# Patient Record
Sex: FEMALE | Race: WHITE | Employment: FULL TIME | URBAN - METROPOLITAN AREA
[De-identification: names, ages, dates, MRNs, and addresses within clinical notes are randomized per-mention and may not be internally consistent; named-entity substitution may affect disease eponyms.]

---

## 2017-01-19 ENCOUNTER — OFFICE VISIT (OUTPATIENT)
Dept: INTERNAL MEDICINE CLINIC | Age: 52
End: 2017-01-19

## 2017-01-19 VITALS
TEMPERATURE: 98.4 F | WEIGHT: 195.8 LBS | BODY MASS INDEX: 34.69 KG/M2 | OXYGEN SATURATION: 99 % | HEART RATE: 102 BPM | DIASTOLIC BLOOD PRESSURE: 86 MMHG | HEIGHT: 63 IN | SYSTOLIC BLOOD PRESSURE: 130 MMHG

## 2017-01-19 DIAGNOSIS — R73.03 PREDIABETES: ICD-10-CM

## 2017-01-19 DIAGNOSIS — R00.2 PALPITATION: Primary | ICD-10-CM

## 2017-01-19 DIAGNOSIS — E78.00 PURE HYPERCHOLESTEROLEMIA: ICD-10-CM

## 2017-01-19 DIAGNOSIS — R53.83 FATIGUE, UNSPECIFIED TYPE: ICD-10-CM

## 2017-01-19 NOTE — PROGRESS NOTES
HISTORY OF PRESENT ILLNESS  Don Mcelroy is a 46 y.o. female. HPI     F/u HLD prediabetes. Palpitations occurring last 6-7 months , several times per day lasts minutes--heart pounding, gets sweaty and sob  On savella and naltrexone per rheum MD -but did not think these were the side effects of the meds  No cp sxs  Takes hctz for hand and pedal edema  MGF-afib  FH DM-2  Only 1 cup of coffee every day--caffeine intake  Has been consuming more fruits and vegetables    Patient Active Problem List    Diagnosis Date Noted    Hepatic steatosis 06/23/2016    Dislocation of prosthetic joint ([facility] 07/25/2014    HLD (hyperlipidemia) 07/11/2014    Prediabetes 09/03/2013    Ischemic colitis ([facility] 09/03/2013    DJD (degenerative joint disease) of knee 06/04/2013    Fibromyalgia 09/30/2011    Insomnia 09/30/2011     Current Outpatient Prescriptions   Medication Sig Dispense Refill    triamterene-hydrochlorothiazide (MAXZIDE) 37.5-25 mg per tablet TAKE 1 TABLET DAILY 90 Tab 3    omeprazole (PRILOSEC) 20 mg capsule TAKE 1 CAPSULE DAILY 90 Cap 2    multivitamin (ONE A DAY) tablet Take 1 Tab by mouth daily.  naltrexone (DEPADE) 50 mg tablet Take 50 mg by mouth daily.  zolpidem (AMBIEN) 10 mg tablet TAKE 1 TABLET BY MOUTH AT BEDTIME AS NEEDED FOR SLEEP 30 tablet 3    cyanocobalamin (VITAMIN B-12) 1,000 mcg tablet Take 1,000 mcg by mouth daily.  EPINEPHrine (EPIPEN) 0.3 mg/0.3 mL injection 0.3 mL by IntraMUSCular route once as needed for 1 dose. 1 Each 1    gabapentin (NEURONTIN) 100 mg tablet Take 100 mg by mouth two (2) times a day.  Milnacipran (SAVELLA) 50 mg tablet Take 50 mg by mouth two (2) times a day.  cyclobenzaprine (FLEXERIL) 10 mg tablet Take 10 mg by mouth two (2) times a day. Allergies   Allergen Reactions    Latex Contact Dermatitis     Welts, redness, \"oozing\" per pt.     Strawberry Anaphylaxis    Demerol [Meperidine] Anaphylaxis    Hydromorphone Other (comments) Nausea and hypotension    Lyrica [Pregabalin] Swelling     Feet and ankles and hands swelling    Morphine Other (comments)     Hypotension      Pcn [Penicillins] Hives      Lab Results  Component Value Date/Time   GFR est AA >60 06/11/2016 04:13 AM   GFR est non-AA >60 06/11/2016 04:13 AM   Creatinine 0.51 06/11/2016 04:13 AM   BUN 10 06/11/2016 04:13 AM   Sodium 140 06/11/2016 04:13 AM   Potassium 3.9 06/11/2016 04:13 AM   Chloride 106 06/11/2016 04:13 AM   CO2 24 06/11/2016 04:13 AM         ROS    Physical Exam   Constitutional: She appears well-developed and well-nourished. Appears stated age   Cardiovascular: Normal rate, regular rhythm and normal heart sounds. Exam reveals no gallop and no friction rub. No murmur heard. Pulmonary/Chest: Effort normal and breath sounds normal. No respiratory distress. She has no wheezes. Abdominal: Soft. Bowel sounds are normal.   Musculoskeletal: She exhibits no edema. Neurological: She is alert. Psychiatric: She has a normal mood and affect. Nursing note and vitals reviewed. ASSESSMENT and PLAN  Virgen Alcaraz was seen today for anxiety, hypertension and palpitations.     Diagnoses and all orders for this visit:    Palpitation  -     CBC W/O DIFF  -     METABOLIC PANEL, COMPREHENSIVE  -     TSH 3RD GENERATION    amb EKG--nsr wnl   Refer to cardiologist-echo, holter etc     Prediabetes  -     HEMOGLOBIN A1C WITH EAG    Pure hypercholesterolemia  -     LIPID PANEL    Fatigue, unspecified type  -     CBC W/O DIFF  -     METABOLIC PANEL, COMPREHENSIVE  -     TSH 3RD GENERATION      Follow-up Disposition: Not on File

## 2017-01-19 NOTE — MR AVS SNAPSHOT
Visit Information Date & Time Provider Department Dept. Phone Encounter #  
 1/19/2017  8:15 AM Danuta Martin, [address] [phone] [id number] Follow-up Instructions Return in about 6 months (around 7/19/2017) for prediaebtes,hld. Upcoming Health Maintenance Date Due Hepatitis C Screening 1965 FOBT Q 1 YEAR AGE 50-75 9/18/2013 BREAST CANCER SCRN MAMMOGRAM 1/7/2016 INFLUENZA AGE 9 TO ADULT 8/1/2016 PAP AKA CERVICAL CYTOLOGY 3/21/2017 DTaP/Tdap/Td series (2 - Td) 6/10/2020 Allergies as of 1/19/2017  Review Complete On: 1/19/2017 By: Deyanira Mcrae LPN Severity Noted Reaction Type Reactions Latex  06/03/2013    Contact Dermatitis Welts, redness, \"oozing\" per pt. [place] 05/23/2013    Anaphylaxis Demerol [Meperidine]  03/15/2010    Anaphylaxis Hydromorphone  07/26/2014   Side Effect Other (comments) Nausea and hypotension Lyrica [Pregabalin]  05/23/2013    Swelling Feet and ankles and hands swelling Morphine  03/15/2010    Other (comments) Hypotension Pcn [Penicillins]  03/15/2010    Hives Current Immunizations  Reviewed on 11/13/2014 Name Date HEP A/HEP B Combined Vaccine 12/7/2010, 6/8/2010 Hepatitis B Vaccine 5/4/2010 Influenza Vaccine 11/3/2014 PPD 4/26/2011,  Incomplete, 5/4/2010 Tdap 6/10/2010 Not reviewed this visit You Were Diagnosed With   
  
 Codes Comments Palpitation    -  Primary ICD-10-CM: R00.2 ICD-9-CM: 785.1 Prediabetes     ICD-10-CM: R73.03 
ICD-9-CM: 790.29 Pure hypercholesterolemia     ICD-10-CM: E78.00 ICD-9-CM: 272.0 Fatigue, unspecified type     ICD-10-CM: R53.83 ICD-9-CM: 780.79 Vitals BP Pulse Temp Height(growth percentile) Weight(growth percentile) SpO2  
 141/87 (BP 1 Location: Left arm, BP Patient Position: Sitting) (!) 102 98.4 °F (36.9 °C) (Oral) 5' 3\" (1.6 m) 195 lb 12.8 oz (88.8 kg) 99% BMI OB Status Smoking Status 34.68 kg/m2 Hysterectomy Never Smoker BMI and BSA Data Body Mass Index Body Surface Area  
 34.68 kg/m 2 1.99 m 2 Preferred Pharmacy Pharmacy Name Phone [facility] [phone] Your Updated Medication List  
  
   
This list is accurate as of: 1/19/17  8:56 AM.  Always use your most recent med list.  
  
  
  
  
 EPINEPHrine 0.3 mg/0.3 mL injection Commonly known as:  EPIPEN  
0.3 mL by IntraMUSCular route once as needed for 1 dose. FLEXERIL 10 mg tablet Generic drug:  cyclobenzaprine Take 10 mg by mouth two (2) times a day.  
  
 gabapentin 100 mg tablet Commonly known as:  NEURONTIN Take 100 mg by mouth two (2) times a day. multivitamin tablet Commonly known as:  ONE A DAY Take 1 Tab by mouth daily. naltrexone 50 mg tablet Commonly known as:  DEPADE Take 50 mg by mouth daily. omeprazole 20 mg capsule Commonly known as:  PRILOSEC  
TAKE 1 CAPSULE DAILY SAVELLA 50 mg tablet Generic drug:  Milnacipran Take 50 mg by mouth two (2) times a day. triamterene-hydroCHLOROthiazide 37.5-25 mg per tablet Commonly known as:  Ky Ferrari TAKE 1 TABLET DAILY  
  
 VITAMIN B-12 1,000 mcg tablet Generic drug:  cyanocobalamin Take 1,000 mcg by mouth daily. zolpidem 10 mg tablet Commonly known as:  AMBIEN  
TAKE 1 TABLET BY MOUTH AT BEDTIME AS NEEDED FOR SLEEP We Performed the Following AMB POC EKG ROUTINE W/ 12 LEADS, INTER & REP [78574 CPT(R)] CBC W/O DIFF [85707 CPT(R)] HEMOGLOBIN A1C WITH EAG [11896 CPT(R)] LIPID PANEL [59512 CPT(R)] METABOLIC PANEL, COMPREHENSIVE [44932 CPT(R)] REFERRAL TO CARDIOLOGY [EIX86 Custom] TSH 3RD GENERATION [80701 CPT(R)] Follow-up Instructions Return in about 6 months (around 7/19/2017) for prediaebtes,hld. Referral Information Referral ID Referred By Referred To  
  
 [id number] LOUIE EDMONDS MD   
   [id number] [facility], [address] [place] [address] Phone: [phone] Fax: [phone] Visits Status Start Date End Date 1 New Request 1/19/17 1/19/18 If your referral has a status of pending review or denied, additional information will be sent to support the outcome of this decision. Introducing [facility] HE[facility]! Dear Brian Evangelista: Thank you for requesting a [employer] account. Our records indicate that you already have an active [employer] account. You can access your account anytime at [email or web address] Did you know that you can access your hospital and ER discharge instructions at any time in [employer]? You can also review all of your test results from your hospital stay or ER visit. Additional Information If you have questions, please visit the Frequently Asked Questions section of the [employer] website at [email or web address] Remember, [employer] is NOT to be used for urgent needs. For medical emergencies, dial 911. Now available from your iPhone and Android! Please provide this summary of care documentation to your next provider. Your primary care clinician is listed as Collette EDMONDS. If you have any questions after today's visit, please call [phone].

## 2017-01-20 DIAGNOSIS — E78.00 PURE HYPERCHOLESTEROLEMIA: Primary | ICD-10-CM

## 2017-01-20 LAB
ALBUMIN SERPL-MCNC: 4.4 G/DL (ref 3.5–5.5)
ALBUMIN/GLOB SERPL: 1.5 {RATIO} (ref 1.1–2.5)
ALP SERPL-CCNC: 126 IU/L (ref 39–117)
ALT SERPL-CCNC: 23 IU/L (ref 0–32)
AST SERPL-CCNC: 22 IU/L (ref 0–40)
BILIRUB SERPL-MCNC: 0.2 MG/DL (ref 0–1.2)
BUN SERPL-MCNC: 29 MG/DL (ref 6–24)
BUN/CREAT SERPL: 48 (ref 9–23)
CALCIUM SERPL-MCNC: 9.6 MG/DL (ref 8.7–10.2)
CHLORIDE SERPL-SCNC: 96 MMOL/L (ref 96–106)
CHOLEST SERPL-MCNC: 294 MG/DL (ref 100–199)
CO2 SERPL-SCNC: 29 MMOL/L (ref 18–29)
CREAT SERPL-MCNC: 0.61 MG/DL (ref 0.57–1)
ERYTHROCYTE [DISTWIDTH] IN BLOOD BY AUTOMATED COUNT: 14.5 % (ref 12.3–15.4)
EST. AVERAGE GLUCOSE BLD GHB EST-MCNC: 128 MG/DL
GLOBULIN SER CALC-MCNC: 3 G/DL (ref 1.5–4.5)
GLUCOSE SERPL-MCNC: 96 MG/DL (ref 65–99)
HBA1C MFR BLD: 6.1 % (ref 4.8–5.6)
HCT VFR BLD AUTO: 40.3 % (ref 34–46.6)
HDLC SERPL-MCNC: 55 MG/DL
HGB BLD-MCNC: 13.4 G/DL (ref 11.1–15.9)
LDLC SERPL CALC-MCNC: 182 MG/DL (ref 0–99)
MCH RBC QN AUTO: 29.1 PG (ref 26.6–33)
MCHC RBC AUTO-ENTMCNC: 33.3 G/DL (ref 31.5–35.7)
MCV RBC AUTO: 87 FL (ref 79–97)
PLATELET # BLD AUTO: 317 X10E3/UL (ref 150–379)
POTASSIUM SERPL-SCNC: 4.4 MMOL/L (ref 3.5–5.2)
PROT SERPL-MCNC: 7.4 G/DL (ref 6–8.5)
RBC # BLD AUTO: 4.61 X10E6/UL (ref 3.77–5.28)
SODIUM SERPL-SCNC: 140 MMOL/L (ref 134–144)
TRIGL SERPL-MCNC: 283 MG/DL (ref 0–149)
TSH SERPL DL<=0.005 MIU/L-ACNC: 1.37 UIU/ML (ref 0.45–4.5)
VLDLC SERPL CALC-MCNC: 57 MG/DL (ref 5–40)
WBC # BLD AUTO: 6.4 X10E3/UL (ref 3.4–10.8)

## 2017-01-20 RX ORDER — ATORVASTATIN CALCIUM 10 MG/1
10 TABLET, FILM COATED ORAL DAILY
Qty: 30 TAB | Refills: 6 | Status: SHIPPED | OUTPATIENT
Start: 2017-01-20 | End: 2017-08-04 | Stop reason: SDUPTHER

## 2017-02-17 ENCOUNTER — OFFICE VISIT (OUTPATIENT)
Dept: CARDIOLOGY CLINIC | Age: 52
End: 2017-02-17

## 2017-02-17 VITALS
BODY MASS INDEX: 34.29 KG/M2 | RESPIRATION RATE: 18 BRPM | DIASTOLIC BLOOD PRESSURE: 82 MMHG | WEIGHT: 193.5 LBS | HEIGHT: 63 IN | HEART RATE: 90 BPM | OXYGEN SATURATION: 97 % | SYSTOLIC BLOOD PRESSURE: 120 MMHG

## 2017-02-17 DIAGNOSIS — R00.2 PALPITATION: Primary | ICD-10-CM

## 2017-02-17 DIAGNOSIS — E78.5 HYPERLIPIDEMIA, UNSPECIFIED HYPERLIPIDEMIA TYPE: ICD-10-CM

## 2017-02-17 DIAGNOSIS — R73.03 PREDIABETES: ICD-10-CM

## 2017-02-17 NOTE — PROGRESS NOTES
Rodrigo Ji NP    Subjective/HPI:     Rose Mary Barnes is a 46 y.o. female is here for new patient consultation. The patient reports with progressive episodes of fluttering and palpitations noted approximately 6 months ago however the frequency and intensity and associated discomfort of the rapid heart beating has alarmed the patient prompting consultation. She reports recently, she develops acute onset of fluttering and palpitations without any particular activities has noted this has awoken her up at night. The frequency of episodes is greater than 3 days apart lasting several seconds to a few minutes. She denies chest pain or dyspnea on exertion however she will feel short of breath with her palpitations/fluttering. She denies any excessive caffeine or stimulant use. Baseline screening for obstructive sleep apnea was positive for fatigue during the day, easily falling asleep, waking up tired, and has been told by her  that she either breathes loud and has had awakened her at night change her breathing pattern. Recently diagnosed with hyperlipidemia, started on atorvastatin by primary care initially she was having some difficulty with the medication stating she was developing a sore throat after however this has decreased and has remained on the medication.     Patient Active Problem List    Diagnosis Date Noted    Palpitation 02/17/2017    Hepatic steatosis 06/23/2016    Dislocation of prosthetic joint ([address] 07/25/2014    HLD (hyperlipidemia) 07/11/2014    Prediabetes 09/03/2013    Ischemic colitis ([address] 09/03/2013    DJD (degenerative joint disease) of knee 06/04/2013    Fibromyalgia 09/30/2011    Insomnia 09/30/2011      Katie Sanford MD  Past Medical History   Diagnosis Date    Arthritis     Bowel trouble      Small Bowel Obstruction    Diabetes ([address]      pre diabetic-DIET CONTROLLED    GERD (gastroesophageal reflux disease)     Other ill-defined conditions(799.89) fibromyalgia    Unspecified adverse effect of anesthesia      b/p drops and hard to wake up      Past Surgical History   Procedure Laterality Date    Hx hysterectomy      Hx heent       tonsillectomy    Hx appendectomy      Hx urological  2000 and 2005     \"growth removed from left kidney\"    Hx orthopaedic       shoulder surgery, unsure of procedure right    Hx orthopaedic  6/3/13     RIGHT TOTAL KNEE ARTHROPLASTY     Allergies   Allergen Reactions    Latex Contact Dermatitis     Welts, redness, \"oozing\" per pt.  Strawberry Anaphylaxis    Demerol [Meperidine] Anaphylaxis    Hydromorphone Other (comments)     Nausea and hypotension    Lyrica [Pregabalin] Swelling     Feet and ankles and hands swelling    Morphine Other (comments)     Hypotension      Pcn [Penicillins] Hives      Family History   Problem Relation Age of Onset    Arthritis-osteo Mother     Hypertension Mother     High Cholesterol Mother     Thyroid Disease Mother     Arthritis-osteo Father     Cancer Father      skin    Hypertension Father     High Cholesterol Father     Anesth Problems Neg Hx     Cancer Maternal Grandmother      Colon Cancer    Cancer Maternal Grandfather      Prostate Cancer      Current Outpatient Prescriptions   Medication Sig    atorvastatin (LIPITOR) 10 mg tablet Take 1 Tab by mouth daily.  triamterene-hydrochlorothiazide (MAXZIDE) 37.5-25 mg per tablet TAKE 1 TABLET DAILY    omeprazole (PRILOSEC) 20 mg capsule TAKE 1 CAPSULE DAILY    multivitamin (ONE A DAY) tablet Take 1 Tab by mouth daily.  zolpidem (AMBIEN) 10 mg tablet TAKE 1 TABLET BY MOUTH AT BEDTIME AS NEEDED FOR SLEEP    cyclobenzaprine (FLEXERIL) 10 mg tablet Take 10 mg by mouth two (2) times a day.  cyanocobalamin (VITAMIN B-12) 1,000 mcg tablet Take 1,000 mcg by mouth daily.  EPINEPHrine (EPIPEN) 0.3 mg/0.3 mL injection 0.3 mL by IntraMUSCular route once as needed for 1 dose.     gabapentin (NEURONTIN) 100 mg tablet Take 100 mg by mouth two (2) times a day.  Milnacipran (SAVELLA) 50 mg tablet Take 50 mg by mouth two (2) times a day.  naltrexone (DEPADE) 50 mg tablet Take 50 mg by mouth daily. No current facility-administered medications for this visit. Vitals:    02/17/17 1446 02/17/17 1454   BP: 120/70 120/82   Pulse: 90    Resp: 18    SpO2: 97%    Weight: 193 lb 8 oz (87.8 kg)    Height: 5' 3\" (1.6 m)        I have reviewed the nurses notes, vitals, problem list, allergy list, medical history, family, social history and medications. Review of Symptoms:    General: Pt denies excessive weight gain or loss. Pt is able to conduct ADL's positive for fatigue.,  HEENT: Denies blurred vision, headaches, epistaxis and difficulty swallowing. Respiratory: Denies shortness of breath, denies ASTUDILLO, wheezing or stridor. Cardiovascular: Denies precordial pain, + palpitations, edema or PND  Gastrointestinal: Denies poor appetite, indigestion, abdominal pain or blood in stool  Musculoskeletal: Denies pain or swelling from muscles or joints  Neurologic: Denies tremor, paresthesias, or sensory motor disturbance  Skin: Denies rash, itching or texture change. Urinary: Denies difficulty with urination  Psyc/Social concerns: Denies depression      Physical Exam:      General: Well developed, cooperative, alert in no acute distress, appears states age. HEENT: Supple, No carotid bruits, no JVD, trach is midline. PERRL, EOM intact  Heart:  Normal S1/S2 negative S3 or S4. Regular, no murmur, gallop or rub.   Respiratory: Clear bilaterally x 4, no wheezing or rales  Abdomen:   Soft, non-tender, no masses, bowel sounds are active.   Extremities:  No edema, normal cap refill, no cyanosis, atraumatic. Neuro: A&Ox3, speech clear, gait stable. Skin: Skin color is normal. No rashes or lesions.  Non diaphoretic  Vascular: 2+ pulses symmetric in all extremities    Cardiographics    ECG: Normal sinus rhythm  Results for orders placed or performed during the hospital encounter of 05/23/13   EKG, 12 LEAD, INITIAL   Result Value Ref Range    Ventricular Rate 86 BPM    Atrial Rate 86 BPM    P-R Interval 142 ms    QRS Duration 90 ms    Q-T Interval 376 ms    QTC Calculation (Bezet) 449 ms    Calculated P Axis 62 degrees    Calculated R Axis -7 degrees    Calculated T Axis 33 degrees    Diagnosis       Normal sinus rhythm  Septal infarct , age undetermined  No previous ECGs available  Confirmed by Rachael Sheriff ([id number]) on 5/24/2013 9:23:19 AM         Cardiology Labs:  Lab Results   Component Value Date/Time    Cholesterol, total 294 01/19/2017 09:06 AM    HDL Cholesterol 55 01/19/2017 09:06 AM    LDL, calculated 182 01/19/2017 09:06 AM    Triglyceride 283 01/19/2017 09:06 AM       Lab Results   Component Value Date/Time    Sodium 140 01/19/2017 09:06 AM    Potassium 4.4 01/19/2017 09:06 AM    Chloride 96 01/19/2017 09:06 AM    CO2 29 01/19/2017 09:06 AM    Anion gap 10 06/11/2016 04:13 AM    Glucose 96 01/19/2017 09:06 AM    BUN 29 01/19/2017 09:06 AM    Creatinine 0.61 01/19/2017 09:06 AM    BUN/Creatinine ratio 48 01/19/2017 09:06 AM    GFR est  01/19/2017 09:06 AM    GFR est non- 01/19/2017 09:06 AM    Calcium 9.6 01/19/2017 09:06 AM    Bilirubin, total 0.2 01/19/2017 09:06 AM    AST (SGOT) 22 01/19/2017 09:06 AM    Alk. phosphatase 126 01/19/2017 09:06 AM    Protein, total 7.4 01/19/2017 09:06 AM    Albumin 4.4 01/19/2017 09:06 AM    Globulin 3.9 06/08/2016 09:57 AM    A-G Ratio 1.5 01/19/2017 09:06 AM    ALT (SGPT) 23 01/19/2017 09:06 AM           Assessment:     Assessment:      Vinayak Benitez was seen today for irregular heart beat. Diagnoses and all orders for this visit:    Palpitation  -     AMB POC EKG ROUTINE W/ 12 LEADS, INTER & REP  -     2D ECHO COMPLETE ADULT (TTE) W OR WO CONTR; Future  -     LOOP MONITOR, Clinic Performed;  Future  -     SLEEP MEDICINE REFERRAL    Hyperlipidemia, unspecified hyperlipidemia type  -     2D ECHO COMPLETE ADULT (TTE) W OR WO CONTR; Future  -     LOOP MONITOR, Clinic Performed; Future  -     SLEEP MEDICINE REFERRAL    Prediabetes  -     2D ECHO COMPLETE ADULT (TTE) W OR WO CONTR; Future  -     LOOP MONITOR, Clinic Performed; Future  -     SLEEP MEDICINE REFERRAL      Specialty Problems        Cardiology Problems    HLD (hyperlipidemia)              ICD-10-CM ICD-9-CM    1. Palpitation R00.2 785.1 AMB POC EKG ROUTINE W/ 12 LEADS, INTER & REP      2D ECHO COMPLETE ADULT (TTE) W OR WO CONTR      LOOP MONITOR      SLEEP MEDICINE REFERRAL   2. Hyperlipidemia, unspecified hyperlipidemia type E78.5 272.4 2D ECHO COMPLETE ADULT (TTE) W OR WO CONTR      LOOP MONITOR      SLEEP MEDICINE REFERRAL   3. Prediabetes R73.03 790.29 2D ECHO COMPLETE ADULT (TTE) W OR WO CONTR      LOOP MONITOR      SLEEP MEDICINE REFERRAL     Orders Placed This Encounter    SLEEP MEDICINE REFERRAL     Referral Priority:   Routine     Referral Type:   Consultation     Referral Reason:   Specialty Services Required     Referred to Provider:   Nina Weaver MD    LOOP MONITOR, Clinic Performed     Standing Status:   Future     Standing Expiration Date:   8/17/2017     Order Specific Question:   Reason for Exam:     Answer:   non daily fluttering and palpitations    AMB POC EKG ROUTINE W/ 12 LEADS, INTER & REP     Order Specific Question:   Reason for Exam:     Answer:   ROUTINE    2D ECHO COMPLETE ADULT (TTE) W OR WO CONTR     Standing Status:   Future     Standing Expiration Date:   8/17/2017     Order Specific Question:   Reason for Exam:     Answer:   palpitations     Order Specific Question:   Contrast Enhancement (Bubble Study, Definity, Optison) may be used if criteria listed in established evidence-based protocol has been identified. Answer:   Yes       PLAN:    Ms. Blas Heimlich is a 66-year-old female presenting with progressive episodes of fluttering and palpitations.   Differential diagnosis is paroxysmal SVT, atrial fibrillation and/or atrial flutter. Will apply event monitor, evaluate cardiac structure with echo. She has symptoms suggestive of obstructive sleep apnea, have referred for sleep study at [address] [place] [address]. Follow-up when testing complete.     Farhana Donovan MD

## 2017-02-17 NOTE — MR AVS SNAPSHOT
Visit Information Date & Time Provider Department Dept. Phone Encounter #  
 2/17/2017  3:15 PM Eduardo Garnett, [address] [facility] [phone] Your Appointments 2/21/2017  3:30 PM  
ECHO CARDIOGRAMS 2D with [address] [facility] Appt Note: Per Dr Cervantes Loss $50CP REM 2D ECHO COMPLETE ADULT (TTE) W OR WO CONTR [75813 CPT(R)] LOOP MONITOR [[id number] Custom] [address] [address]  
[phone] [address] [address]  
  
    
 3/7/2017  3:30 PM  
HOLTER MONITOR with [address], [facility] Appt Note: Per Dr Cervantes Loss $50CP REM 2D ECHO COMPLETE ADULT (TTE) W OR WO CONTR [21101 CPT(R)] LOOP MONITOR [[id number] Custom] [address] [facility] [address] [place] [address]  
[phone] [address] [address]  
  
    
 7/20/2017  8:15 AM  
ROUTINE CARE with Colby Liraalize, [address] [place] [address],4[address] Floor [facility]) Appt Note: 6 month follow up  
 [address] [address]  
[address] [place] [address] Upcoming Health Maintenance Date Due Hepatitis C Screening 1965 FOBT Q 1 YEAR AGE 50-75 9/18/2013 BREAST CANCER SCRN MAMMOGRAM 1/7/2016 INFLUENZA AGE 9 TO ADULT 8/1/2016 PAP AKA CERVICAL CYTOLOGY 3/21/2017 DTaP/Tdap/Td series (2 - Td) 6/10/2020 Allergies as of 2/17/2017  Review Complete On: 2/17/2017 By: Irene Lenz LPN Severity Noted Reaction Type Reactions Latex  06/03/2013    Contact Dermatitis Welts, redness, \"oozing\" per pt. [place] 05/23/2013    Anaphylaxis Demerol [Meperidine]  03/15/2010    Anaphylaxis Hydromorphone  07/26/2014   Side Effect Other (comments) Nausea and hypotension Lyrica [Pregabalin]  05/23/2013    Swelling Feet and ankles and hands swelling Morphine  03/15/2010    Other (comments) Hypotension Pcn [Penicillins]  03/15/2010    Hives Current Immunizations  Reviewed on 11/13/2014 Name Date HEP A/HEP B Combined Vaccine 12/7/2010, 6/8/2010 Hepatitis B Vaccine 5/4/2010 Influenza Vaccine 11/3/2014 PPD 4/26/2011,  Incomplete, 5/4/2010 Tdap 6/10/2010 Not reviewed this visit You Were Diagnosed With   
  
 Codes Comments Palpitation    -  Primary ICD-10-CM: R00.2 ICD-9-CM: 785.1 Hyperlipidemia, unspecified hyperlipidemia type     ICD-10-CM: E78.5 ICD-9-CM: 272.4 Prediabetes     ICD-10-CM: R73.03 
ICD-9-CM: 790.29 Vitals BP Pulse Resp Height(growth percentile) Weight(growth percentile) SpO2  
 120/82 (BP 1 Location: Right arm, BP Patient Position: Sitting) 90 18 5' 3\" (1.6 m) 193 lb 8 oz (87.8 kg) 97% BMI OB Status Smoking Status 34.28 kg/m2 Hysterectomy Never Smoker Vitals History BMI and BSA Data Body Mass Index Body Surface Area  
 34.28 kg/m 2 1.98 m 2 Preferred Pharmacy Pharmacy Name Phone [facility]/PHARMACY #[id number] [phone] [facility] [phone] Your Updated Medication List  
  
   
This list is accurate as of: 2/17/17  3:44 PM.  Always use your most recent med list.  
  
  
  
  
 atorvastatin 10 mg tablet Commonly known as:  LIPITOR Take 1 Tab by mouth daily. EPINEPHrine 0.3 mg/0.3 mL injection Commonly known as:  EPIPEN  
0.3 mL by IntraMUSCular route once as needed for 1 dose. FLEXERIL 10 mg tablet Generic drug:  cyclobenzaprine Take 10 mg by mouth two (2) times a day.  
  
 gabapentin 100 mg tablet Commonly known as:  NEURONTIN Take 100 mg by mouth two (2) times a day. multivitamin tablet Commonly known as:  ONE A DAY Take 1 Tab by mouth daily. naltrexone 50 mg tablet Commonly known as:  DEPADE  
 Take 50 mg by mouth daily. omeprazole 20 mg capsule Commonly known as:  PRILOSEC  
TAKE 1 CAPSULE DAILY SAVELLA 50 mg tablet Generic drug:  Milnacipran Take 50 mg by mouth two (2) times a day. triamterene-hydroCHLOROthiazide 37.5-25 mg per tablet Commonly known as:  Rose Guillen TAKE 1 TABLET DAILY  
  
 VITAMIN B-12 1,000 mcg tablet Generic drug:  cyanocobalamin Take 1,000 mcg by mouth daily. zolpidem 10 mg tablet Commonly known as:  AMBIEN  
TAKE 1 TABLET BY MOUTH AT BEDTIME AS NEEDED FOR SLEEP We Performed the Following AMB POC EKG ROUTINE W/ 12 LEADS, INTER & REP [41715 CPT(R)] SLEEP MEDICINE REFERRAL [[id number] Custom] Comments:  
 Please evaluate patient for MARIA ISABEL To-Do List   
 02/23/2017 ECHO:  2D ECHO COMPLETE ADULT (TTE) W OR WO CONTR   
  
 02/23/2017 Cardiac Services:  LOOP MONITOR Referral Information Referral ID Referred By Referred To  
  
 [id number] Ameya MIRANDA MD   
   [address] [place] [address] [address] Phone: [phone] Fax: [phone] Visits Status Start Date End Date 1 New Request 2/17/17 2/17/18 If your referral has a status of pending review or denied, additional information will be sent to support the outcome of this decision. Introducing [facility] & HEALTH SERVICES! Dear Ashley Umanzor: Thank you for requesting a [employer] account. Our records indicate that you already have an active [employer] account. You can access your account anytime at [email or web address] Did you know that you can access your hospital and ER discharge instructions at any time in [employer]? You can also review all of your test results from your hospital stay or ER visit. Additional Information If you have questions, please visit the Frequently Asked Questions section of the [employer] website at [email or web address] Remember, MyChart is NOT to be used for urgent needs. For medical emergencies, dial 911. Now available from your iPhone and Android! Please provide this summary of care documentation to your next provider. Your primary care clinician is listed as Osman EDMONDS. If you have any questions after today's visit, please call [phone].

## 2017-02-21 ENCOUNTER — CLINICAL SUPPORT (OUTPATIENT)
Dept: CARDIOLOGY CLINIC | Age: 52
End: 2017-02-21

## 2017-02-21 DIAGNOSIS — R00.2 PALPITATION: ICD-10-CM

## 2017-02-21 DIAGNOSIS — R73.03 PREDIABETES: ICD-10-CM

## 2017-02-21 DIAGNOSIS — E78.5 HYPERLIPIDEMIA, UNSPECIFIED HYPERLIPIDEMIA TYPE: ICD-10-CM

## 2017-03-07 ENCOUNTER — OFFICE VISIT (OUTPATIENT)
Dept: CARDIOLOGY CLINIC | Age: 52
End: 2017-03-07

## 2017-03-07 DIAGNOSIS — E78.5 HYPERLIPIDEMIA, UNSPECIFIED HYPERLIPIDEMIA TYPE: ICD-10-CM

## 2017-03-07 DIAGNOSIS — R73.03 PREDIABETES: ICD-10-CM

## 2017-03-07 DIAGNOSIS — R00.2 PALPITATION: ICD-10-CM

## 2017-03-15 LAB
ALT SERPL-CCNC: 21 IU/L (ref 0–32)
AST SERPL-CCNC: 22 IU/L (ref 0–40)
CHOLEST SERPL-MCNC: 195 MG/DL (ref 100–199)
HDLC SERPL-MCNC: 56 MG/DL
LDLC SERPL CALC-MCNC: 111 MG/DL (ref 0–99)
TRIGL SERPL-MCNC: 140 MG/DL (ref 0–149)
VLDLC SERPL CALC-MCNC: 28 MG/DL (ref 5–40)

## 2017-03-24 ENCOUNTER — PATIENT OUTREACH (OUTPATIENT)
Dept: INTERNAL MEDICINE CLINIC | Age: 52
End: 2017-03-24

## 2017-03-24 NOTE — PROGRESS NOTES
NNTOCIP Post Hospitalization       - Patient attended [facility] appointment with Dr. Lanie Landrum on 6/23/16.  - Patient attended office visit with Dr. Chauncey Coelho (General Surgery) on 6/23/16. To the best of this NN's knowledge, this patient had no additional ED visits or Hospital Admissions during 30 day NIR period following admission to [facility] 6/7/16-6/12/16. NIR period has ended. Post-Hospitalization Episode Resolved. Future Appointments:  Future Appointments      Provider Law Melara   7/20/2017 8:15 AM Serg Londono, [address]                    This note will not be viewable in [address]

## 2017-04-04 RX ORDER — AZITHROMYCIN 250 MG/1
250 TABLET, FILM COATED ORAL SEE ADMIN INSTRUCTIONS
Qty: 6 TAB | Refills: 0 | Status: SHIPPED | OUTPATIENT
Start: 2017-04-04 | End: 2017-04-09

## 2017-08-05 RX ORDER — ATORVASTATIN CALCIUM 10 MG/1
TABLET, FILM COATED ORAL
Qty: 30 TAB | Refills: 6 | Status: SHIPPED | OUTPATIENT
Start: 2017-08-05 | End: 2017-09-27 | Stop reason: SDUPTHER

## 2017-09-05 ENCOUNTER — OFFICE VISIT (OUTPATIENT)
Dept: INTERNAL MEDICINE CLINIC | Age: 52
End: 2017-09-05

## 2017-09-05 VITALS
OXYGEN SATURATION: 98 % | HEART RATE: 97 BPM | TEMPERATURE: 98.2 F | SYSTOLIC BLOOD PRESSURE: 119 MMHG | DIASTOLIC BLOOD PRESSURE: 80 MMHG

## 2017-09-05 DIAGNOSIS — R53.83 FATIGUE, UNSPECIFIED TYPE: ICD-10-CM

## 2017-09-05 DIAGNOSIS — R73.03 PREDIABETES: ICD-10-CM

## 2017-09-05 DIAGNOSIS — Z12.39 BREAST SCREENING: ICD-10-CM

## 2017-09-05 DIAGNOSIS — E78.5 HYPERLIPIDEMIA, UNSPECIFIED HYPERLIPIDEMIA TYPE: Primary | ICD-10-CM

## 2017-09-05 DIAGNOSIS — Z11.59 ENCOUNTER FOR HEPATITIS C SCREENING TEST FOR LOW RISK PATIENT: ICD-10-CM

## 2017-09-05 NOTE — MR AVS SNAPSHOT
Visit Information Date & Time Provider Department Dept. Phone Encounter #  
 9/5/2017 11:15 AM Mireille Rivera, [address] [phone] [id number] Follow-up Instructions Return in about 6 months (around 3/5/2018) for hld prediabetes. Upcoming Health Maintenance Date Due Hepatitis C Screening 1965 FOBT Q 1 YEAR AGE 50-75 9/18/2013 BREAST CANCER SCRN MAMMOGRAM 1/7/2016 PAP AKA CERVICAL CYTOLOGY 3/21/2017 INFLUENZA AGE 9 TO ADULT 8/1/2017 DTaP/Tdap/Td series (2 - Td) 6/10/2020 Allergies as of 9/5/2017  Review Complete On: 9/5/2017 By: Cade Weaver LPN Severity Noted Reaction Type Reactions Latex  06/03/2013    Contact Dermatitis Welts, redness, \"oozing\" per pt. [place] 05/23/2013    Anaphylaxis Demerol [Meperidine]  03/15/2010    Anaphylaxis Hydromorphone  07/26/2014   Side Effect Other (comments) Nausea and hypotension Lyrica [Pregabalin]  05/23/2013    Swelling Feet and ankles and hands swelling Morphine  03/15/2010    Other (comments) Hypotension Pcn [Penicillins]  03/15/2010    Hives Current Immunizations  Reviewed on 11/13/2014 Name Date HEP A/HEP B Combined Vaccine 12/7/2010, 6/8/2010 Hepatitis B Vaccine 5/4/2010 Influenza Vaccine 11/3/2014 PPD 4/26/2011,  Incomplete, 5/4/2010 Tdap 6/10/2010 Not reviewed this visit You Were Diagnosed With   
  
 Codes Comments Hyperlipidemia, unspecified hyperlipidemia type    -  Primary ICD-10-CM: E78.5 ICD-9-CM: 272.4 Prediabetes     ICD-10-CM: R73.03 
ICD-9-CM: 790.29 Fatigue, unspecified type     ICD-10-CM: R53.83 ICD-9-CM: 780.79 Encounter for hepatitis C screening test for low risk patient     ICD-10-CM: Z11.59 
ICD-9-CM: V73.89 Breast screening     ICD-10-CM: Z12.39 
ICD-9-CM: V76.10 Vitals OB Status Smoking Status Hysterectomy Never Smoker Preferred Pharmacy Pharmacy Name Phone [facility]/PHARMACY #[id number] [facility] [phone] Your Updated Medication List  
  
   
This list is accurate as of: 9/5/17 12:01 PM.  Always use your most recent med list.  
  
  
  
  
 atorvastatin 10 mg tablet Commonly known as:  LIPITOR  
TAKE 1 TAB BY MOUTH DAILY. EPINEPHrine 0.3 mg/0.3 mL injection Commonly known as:  EPIPEN  
0.3 mL by IntraMUSCular route once as needed for 1 dose. FLEXERIL 10 mg tablet Generic drug:  cyclobenzaprine Take 10 mg by mouth two (2) times a day.  
  
 gabapentin 100 mg tablet Commonly known as:  NEURONTIN Take 100 mg by mouth two (2) times a day. multivitamin tablet Commonly known as:  ONE A DAY Take 1 Tab by mouth daily. naltrexone 50 mg tablet Commonly known as:  DEPADE Take 50 mg by mouth daily. omeprazole 20 mg capsule Commonly known as:  PRILOSEC  
TAKE 1 CAPSULE DAILY SAVELLA 50 mg tablet Generic drug:  Milnacipran Take 50 mg by mouth two (2) times a day. triamterene-hydroCHLOROthiazide 37.5-25 mg per tablet Commonly known as:  Thornell Nicole TAKE 1 TABLET DAILY  
  
 VITAMIN B-12 1,000 mcg tablet Generic drug:  cyanocobalamin Take 1,000 mcg by mouth daily. zolpidem 10 mg tablet Commonly known as:  AMBIEN  
TAKE 1 TABLET BY MOUTH AT BEDTIME AS NEEDED FOR SLEEP We Performed the Following ALT [id number] CPT(R)] AST [id number] CPT(R)] HEMOGLOBIN A1C WITH EAG [86804 CPT(R)] HEPATITIS C AB [78066 CPT(R)] LIPID PANEL [97130 CPT(R)] TSH 3RD GENERATION [16070 CPT(R)] Follow-up Instructions Return in about 6 months (around 3/5/2018) for hld prediabetes. To-Do List   
 09/23/2017 Imaging:  [facility] MAMMO BI SCREENING INCL CAD Introducing [facility] & HEALTH SERVICES! Dear Robert Dominguez: Thank you for requesting a [employer]way[employer] account.   Our records indicate that you already have an active Site Tour account. You can access your account anytime at [email or web address] Did you know that you can access your hospital and ER discharge instructions at any time in [employer] Tour? You can also review all of your test results from your hospital stay or ER visit. Additional Information If you have questions, please visit the Frequently Asked Questions section of the [employer] website at [email or web address] Remember, Site Tour is NOT to be used for urgent needs. For medical emergencies, dial 911. Now available from your iPhone and Android! Please provide this summary of care documentation to your next provider. Your primary care clinician is listed as Quique EDMONDS. If you have any questions after today's visit, please call [phone].

## 2017-09-05 NOTE — PROGRESS NOTES
HISTORY OF PRESENT ILLNESS  Tone Galvan is a 46 y.o. female. HPI   F/u HLD prediabetes. Taking lipitor--tolerating well  Has been faitgued--feels ok in mornings most of the time but by 10 am ready for nap. Has been very stress worried about parents--F has Dementia  Takes ambuien most night  No witnessed apnea or snoring by her  per pt  Palpitations occurring last 6-7 months , several times per day lasts minutes--heart pounding, gets sweaty and sob  On savella and naltrexone per rheum MD -but did not think these were the side effects of the meds  No cp sxs  Takes hctz for hand and pedal edema  MGF-afib  FH DM-2  Only 1 cup of coffee every day--caffeine intake  Has been consuming more fruits and vegetables  Due for mammogram now  Due for colonoscopy next year per tp      Patient Active Problem List    Diagnosis Date Noted    Palpitation 02/17/2017    Hepatic steatosis 06/23/2016    Dislocation of prosthetic joint ([facility] [address] 07/25/2014    HLD (hyperlipidemia) 07/11/2014    Prediabetes 09/03/2013    Ischemic colitis ([facility] [address] 09/03/2013    DJD (degenerative joint disease) of knee 06/04/2013    Fibromyalgia 09/30/2011    Insomnia 09/30/2011     Current Outpatient Prescriptions   Medication Sig Dispense Refill    atorvastatin (LIPITOR) 10 mg tablet TAKE 1 TAB BY MOUTH DAILY. 30 Tab 6    omeprazole (PRILOSEC) 20 mg capsule TAKE 1 CAPSULE DAILY 90 Cap 3    triamterene-hydrochlorothiazide (MAXZIDE) 37.5-25 mg per tablet TAKE 1 TABLET DAILY 90 Tab 3    multivitamin (ONE A DAY) tablet Take 1 Tab by mouth daily.  naltrexone (DEPADE) 50 mg tablet Take 50 mg by mouth daily.  zolpidem (AMBIEN) 10 mg tablet TAKE 1 TABLET BY MOUTH AT BEDTIME AS NEEDED FOR SLEEP 30 tablet 3    cyanocobalamin (VITAMIN B-12) 1,000 mcg tablet Take 1,000 mcg by mouth daily.  gabapentin (NEURONTIN) 100 mg tablet Take 100 mg by mouth two (2) times a day.         Milnacipran (SAVELLA) 50 mg tablet Take 50 mg by mouth two (2) times a day.  cyclobenzaprine (FLEXERIL) 10 mg tablet Take 10 mg by mouth two (2) times a day.  EPINEPHrine (EPIPEN) 0.3 mg/0.3 mL injection 0.3 mL by IntraMUSCular route once as needed for 1 dose. 1 Each 1     Allergies   Allergen Reactions    Latex Contact Dermatitis     Welts, redness, \"oozing\" per pt.  Strawberry Anaphylaxis    Demerol [Meperidine] Anaphylaxis    Hydromorphone Other (comments)     Nausea and hypotension    Lyrica [Pregabalin] Swelling     Feet and ankles and hands swelling    Morphine Other (comments)     Hypotension      Pcn [Penicillins] Hives      Lab Results  Component Value Date/Time   WBC 6.4 01/19/2017 09:06 AM   HGB 13.4 01/19/2017 09:06 AM   HCT 40.3 01/19/2017 09:06 AM   PLATELET 173 79/27/7807 09:06 AM   MCV 87 01/19/2017 09:06 AM     Lab Results  Component Value Date/Time   Hemoglobin A1c 6.1 01/19/2017 09:06 AM   Hemoglobin A1c 6.3 07/10/2014 09:25 AM   Hemoglobin A1c 6.3 05/23/2013 01:12 PM   Glucose 96 01/19/2017 09:06 AM   Glucose (POC) 86 06/09/2016 12:08 PM   LDL, calculated 111 03/14/2017 08:07 AM   Creatinine 0.61 01/19/2017 09:06 AM      Lab Results  Component Value Date/Time   Cholesterol, total 195 03/14/2017 08:07 AM   HDL Cholesterol 56 03/14/2017 08:07 AM   LDL, calculated 111 03/14/2017 08:07 AM   Triglyceride 140 03/14/2017 08:07 AM          ROS    Physical Exam   Constitutional: She appears well-developed and well-nourished. Appears stated age   Cardiovascular: Normal rate, regular rhythm and normal heart sounds. Exam reveals no gallop and no friction rub. No murmur heard. Pulmonary/Chest: Effort normal and breath sounds normal. No respiratory distress. She has no wheezes. Abdominal: Soft. Bowel sounds are normal.   Musculoskeletal: She exhibits no edema. Neurological: She is alert. Psychiatric: She has a normal mood and affect. Nursing note and vitals reviewed.       ASSESSMENT and PLAN  Diagnoses and all orders for this visit: 1. Hyperlipidemia, unspecified hyperlipidemia type  -     LIPID PANEL  -     AST  -     ALT   Continue lipitor    2. Prediabetes  -     HEMOGLOBIN A1C WITH EAG    3. Fatigue, unspecified type  -     TSH 3RD GENERATION   Pt declines referral to sleep MD at this time   ? D/t stress of fathers condition-dementia    4. Encounter for hepatitis C screening test for low risk patient  -     HEPATITIS C AB    5. Breast screening  -     [facility] MAMMO BI SCREENING INCL CAD; Future      Follow-up Disposition:  Return in about 6 months (around 3/5/2018) for hld prediabetes.

## 2017-09-05 NOTE — PROGRESS NOTES
Patient is here today for 6 month follow up  Cholesterol ,blood sugar problem and labs. Patient is complaining of fatigue and fall  Asleep while sitting up resting.

## 2017-09-06 LAB
ALT SERPL-CCNC: 25 IU/L (ref 0–32)
AST SERPL-CCNC: 26 IU/L (ref 0–40)
CHOLEST SERPL-MCNC: 202 MG/DL (ref 100–199)
EST. AVERAGE GLUCOSE BLD GHB EST-MCNC: 128 MG/DL
HBA1C MFR BLD: 6.1 % (ref 4.8–5.6)
HCV AB S/CO SERPL IA: <0.1 S/CO RATIO (ref 0–0.9)
HDLC SERPL-MCNC: 72 MG/DL
LDLC SERPL CALC-MCNC: 105 MG/DL (ref 0–99)
TRIGL SERPL-MCNC: 126 MG/DL (ref 0–149)
TSH SERPL DL<=0.005 MIU/L-ACNC: 1.53 UIU/ML (ref 0.45–4.5)
VLDLC SERPL CALC-MCNC: 25 MG/DL (ref 5–40)

## 2017-09-13 RX ORDER — TRIAMTERENE/HYDROCHLOROTHIAZID 37.5-25 MG
TABLET ORAL
Qty: 90 TAB | Refills: 3 | Status: SHIPPED | OUTPATIENT
Start: 2017-09-13 | End: 2018-09-10 | Stop reason: SDUPTHER

## 2017-09-15 RX ORDER — ESCITALOPRAM OXALATE 10 MG/1
10 TABLET ORAL DAILY
Qty: 30 TAB | Refills: 3 | Status: SHIPPED | OUTPATIENT
Start: 2017-09-15 | End: 2017-09-18 | Stop reason: SDUPTHER

## 2017-09-18 RX ORDER — ESCITALOPRAM OXALATE 10 MG/1
10 TABLET ORAL DAILY
Qty: 10 TAB | Refills: 0 | Status: SHIPPED | OUTPATIENT
Start: 2017-09-18 | End: 2017-09-18 | Stop reason: SDUPTHER

## 2017-09-18 RX ORDER — ESCITALOPRAM OXALATE 10 MG/1
10 TABLET ORAL DAILY
Qty: 90 TAB | Refills: 1 | Status: SHIPPED | OUTPATIENT
Start: 2017-09-18 | End: 2018-02-28 | Stop reason: SDUPTHER

## 2017-09-27 RX ORDER — ATORVASTATIN CALCIUM 10 MG/1
TABLET, FILM COATED ORAL
Qty: 90 TAB | Refills: 3 | Status: SHIPPED | OUTPATIENT
Start: 2017-09-27 | End: 2018-09-05 | Stop reason: SDUPTHER

## 2017-11-01 ENCOUNTER — OFFICE VISIT (OUTPATIENT)
Dept: INTERNAL MEDICINE CLINIC | Age: 52
End: 2017-11-01

## 2017-11-01 VITALS
HEART RATE: 100 BPM | OXYGEN SATURATION: 96 % | DIASTOLIC BLOOD PRESSURE: 84 MMHG | TEMPERATURE: 98.4 F | SYSTOLIC BLOOD PRESSURE: 132 MMHG | WEIGHT: 197 LBS | BODY MASS INDEX: 34.9 KG/M2

## 2017-11-01 DIAGNOSIS — Z80.0 FH: COLON CANCER: ICD-10-CM

## 2017-11-01 DIAGNOSIS — J01.90 ACUTE SINUSITIS, RECURRENCE NOT SPECIFIED, UNSPECIFIED LOCATION: ICD-10-CM

## 2017-11-01 DIAGNOSIS — F41.9 ANXIETY: Primary | ICD-10-CM

## 2017-11-01 RX ORDER — AZITHROMYCIN 250 MG/1
250 TABLET, FILM COATED ORAL SEE ADMIN INSTRUCTIONS
Qty: 6 TAB | Refills: 0 | Status: SHIPPED | OUTPATIENT
Start: 2017-11-01 | End: 2017-11-06

## 2017-11-01 NOTE — PROGRESS NOTES
Patient is here today for return visit concerning medication  Evaluation the drug lexapro. Patient is during well with drug. Patient is complaining about sinus infection. Onset x 2 days.

## 2017-11-01 NOTE — MR AVS SNAPSHOT
Visit Information Date & Time Provider Department Dept. Phone Encounter #  
 11/1/2017  8:45 AM Stefanie Cervantes, [address] [phone] [id number] Follow-up Instructions Return in about 6 months (around 5/1/2018) for anxiety a1c labs. Routing History Your Appointments 2/6/2018  8:15 AM  
ROUTINE CARE with Stefanie Cervantes, [address] [facility]) Appt Note: 6 month follow up  
 [facility] [address] [address]  
[address] [facility] [address] [facility] Upcoming Health Maintenance Date Due FOBT Q 1 YEAR AGE 50-75 9/18/2013 BREAST CANCER SCRN MAMMOGRAM 1/7/2016 PAP AKA CERVICAL CYTOLOGY 3/21/2017 DTaP/Tdap/Td series (2 - Td) 6/10/2020 Allergies as of 11/1/2017  Review Complete On: 11/1/2017 By: Stefanie Cervantes MD  
  
 Severity Noted Reaction Type Reactions Latex  06/03/2013    Contact Dermatitis Welts, redness, \"oozing\" per pt. [place] 05/23/2013    Anaphylaxis Demerol [Meperidine]  03/15/2010    Anaphylaxis Hydromorphone  07/26/2014   Side Effect Other (comments) Nausea and hypotension Lyrica [Pregabalin]  05/23/2013    Swelling Feet and ankles and hands swelling Morphine  03/15/2010    Other (comments) Hypotension Pcn [Penicillins]  03/15/2010    Hives Current Immunizations  Reviewed on 11/13/2014 Name Date HEP A/HEP B Combined Vaccine 12/7/2010, 6/8/2010 Hepatitis B Vaccine 5/4/2010 Influenza Vaccine 11/3/2014 PPD 4/26/2011,  Incomplete, 5/4/2010 Tdap 6/10/2010 Not reviewed this visit You Were Diagnosed With   
  
 Codes Comments Anxiety    -  Primary ICD-10-CM: F41.9 ICD-9-CM: 300.00 FH: colon cancer     ICD-10-CM: Z80.0 ICD-9-CM: V16.0 Acute sinusitis, recurrence not specified, unspecified location     ICD-10-CM: J01.90 ICD-9-CM: 461.9 Vitals BP Pulse Temp Weight(growth percentile) SpO2 BMI  
 132/84 (BP 1 Location: Left arm, BP Patient Position: Sitting) 100 98.4 °F (36.9 °C) (Oral) 197 lb (89.4 kg) 96% 34.9 kg/m2 OB Status Smoking Status Hysterectomy Never Smoker BMI and BSA Data Body Mass Index Body Surface Area 34.9 kg/m 2 1.99 m 2 Preferred Pharmacy Pharmacy Name Phone [facility]/PHARMACY #[id number] [phone] [facility] [phone] Your Updated Medication List  
  
   
This list is accurate as of: 11/1/17  9:12 AM.  Always use your most recent med list.  
  
  
  
  
 atorvastatin 10 mg tablet Commonly known as:  LIPITOR  
TAKE 1 TAB BY MOUTH DAILY. azithromycin 250 mg tablet Commonly known as:  Arthea Bruch Take 1 Tab by mouth See Admin Instructions for 5 days. EPINEPHrine 0.3 mg/0.3 mL injection Commonly known as:  EPIPEN  
0.3 mL by IntraMUSCular route once as needed for 1 dose. escitalopram oxalate 10 mg tablet Commonly known as:  Skip  Take 1 Tab by mouth daily. FLEXERIL 10 mg tablet Generic drug:  cyclobenzaprine Take 10 mg by mouth two (2) times a day.  
  
 gabapentin 100 mg tablet Commonly known as:  NEURONTIN Take 100 mg by mouth two (2) times a day. multivitamin tablet Commonly known as:  ONE A DAY Take 1 Tab by mouth daily. naltrexone 50 mg tablet Commonly known as:  DEPADE Take 50 mg by mouth daily. omeprazole 20 mg capsule Commonly known as:  PRILOSEC  
TAKE 1 CAPSULE DAILY SAVELLA 50 mg tablet Generic drug:  Milnacipran Take 50 mg by mouth two (2) times a day. triamterene-hydroCHLOROthiazide 37.5-25 mg per tablet Commonly known as:  Flores Do TAKE 1 TABLET DAILY  
  
 VITAMIN B-12 1,000 mcg tablet Generic drug:  cyanocobalamin Take 1,000 mcg by mouth daily. zolpidem 10 mg tablet Commonly known as:  AMBIEN  
 TAKE 1 TABLET BY MOUTH AT BEDTIME AS NEEDED FOR SLEEP Prescriptions Sent to Pharmacy Refills  
 azithromycin (ZITHROMAX) 250 mg tablet 0 Sig: Take 1 Tab by mouth See Admin Instructions for 5 days. Class: Normal  
 Pharmacy: [facility] , [address] [place] [address] #: [phone] Route: Oral  
  
Follow-up Instructions Return in about 6 months (around 5/1/2018) for anxiety a1c labs. To-Do List   
 11/18/2017 11:00 AM  
  Appointment with [facility] 4 at [address] [place] [address] ([phone]) Shower or bathe using soap and water. Do not use deodorant, powder, perfumes, or lotion the day of your exam.  If your prior mammograms were not performed at [facility] 6 please bring films with you or forward prior images 2 days before your procedure. Check in at registration 15min before your appointment time unless you were instructed to do otherwise. A script is not necessary, but if you have one, please bring it on the day of the mammogram or have it faxed to the department. [facility] [phone] [facility]  [phone] [address]  [phone] [facility] [phone] [place] [phone] [facility] & HEALTH SERVICES! Dear Alistair Maddox: Thank you for requesting a [employer] account. Our records indicate that you already have an active [employer] account. You can access your account anytime at [email or web address] Did you know that you can access your hospital and ER discharge instructions at any time in [employer]? You can also review all of your test results from your hospital stay or ER visit. Additional Information If you have questions, please visit the Frequently Asked Questions section of the [employer] website at [email or web address] Remember, [employer] is NOT to be used for urgent needs. For medical emergencies, dial 911. Now available from your iPhone and Android! Please provide this summary of care documentation to your next provider. Your primary care clinician is listed as Osman EDMONDS. If you have any questions after today's visit, please call [phone].

## 2017-11-01 NOTE — PROGRESS NOTES
HISTORY OF PRESENT ILLNESS  Bo Berkowitz is a 45 y.o. female. HPI   6 week f/u anxiety with palpitation, SOB and insomnia   Started on lexapro 10 mg every day 6 weeks, doing much better, no longer with panic attacks  Stress from her job and taking care of her parent. Parents will be moving into her house this weekend  Not having to use xanax  Started 2 d ago pressure in sisnus, headaches, some cough, voice now hoarse with sorethroat  Father recently dx with colon cancer at age 79--pt is getting colonoscopy q5 yrs already d/t hx ischemic colitis    Patient Active Problem List    Diagnosis Date Noted    FH: colon cancer 11/01/2017    Palpitation 02/17/2017    Hepatic steatosis 06/23/2016    Dislocation of prosthetic joint ([facility] [address] 07/25/2014    HLD (hyperlipidemia) 07/11/2014    Prediabetes 09/03/2013    Ischemic colitis ([facility] [address] 09/03/2013    DJD (degenerative joint disease) of knee 06/04/2013    Fibromyalgia 09/30/2011    Insomnia 09/30/2011     Current Outpatient Prescriptions   Medication Sig Dispense Refill    azithromycin (ZITHROMAX) 250 mg tablet Take 1 Tab by mouth See Admin Instructions for 5 days. 6 Tab 0    atorvastatin (LIPITOR) 10 mg tablet TAKE 1 TAB BY MOUTH DAILY. 90 Tab 3    escitalopram oxalate (LEXAPRO) 10 mg tablet Take 1 Tab by mouth daily. 90 Tab 1    triamterene-hydroCHLOROthiazide (MAXZIDE) 37.5-25 mg per tablet TAKE 1 TABLET DAILY 90 Tab 3    omeprazole (PRILOSEC) 20 mg capsule TAKE 1 CAPSULE DAILY 90 Cap 3    multivitamin (ONE A DAY) tablet Take 1 Tab by mouth daily.  naltrexone (DEPADE) 50 mg tablet Take 50 mg by mouth daily.  zolpidem (AMBIEN) 10 mg tablet TAKE 1 TABLET BY MOUTH AT BEDTIME AS NEEDED FOR SLEEP 30 tablet 3    cyanocobalamin (VITAMIN B-12) 1,000 mcg tablet Take 1,000 mcg by mouth daily.  gabapentin (NEURONTIN) 100 mg tablet Take 100 mg by mouth two (2) times a day.         Milnacipran (SAVELLA) 50 mg tablet Take 50 mg by mouth two (2) times a day.      cyclobenzaprine (FLEXERIL) 10 mg tablet Take 10 mg by mouth two (2) times a day.  EPINEPHrine (EPIPEN) 0.3 mg/0.3 mL injection 0.3 mL by IntraMUSCular route once as needed for 1 dose. 1 Each 1     Allergies   Allergen Reactions    Latex Contact Dermatitis     Welts, redness, \"oozing\" per pt.  Strawberry Anaphylaxis    Demerol [Meperidine] Anaphylaxis    Hydromorphone Other (comments)     Nausea and hypotension    Lyrica [Pregabalin] Swelling     Feet and ankles and hands swelling    Morphine Other (comments)     Hypotension      Pcn [Penicillins] Hives           ROS    Physical Exam   Constitutional: She appears well-developed and well-nourished. Appears stated age, voice is hoarse   HENT:   Mouth/Throat: Oropharynx is clear and moist.   Cardiovascular: Normal rate, regular rhythm and normal heart sounds. Exam reveals no gallop and no friction rub. No murmur heard. Pulmonary/Chest: Effort normal and breath sounds normal. No respiratory distress. She has no wheezes. She has no rales. She exhibits no tenderness. Abdominal: Soft. Bowel sounds are normal.   Musculoskeletal: She exhibits no edema. Neurological: She is alert. Psychiatric: She has a normal mood and affect. Nursing note and vitals reviewed. ASSESSMENT and PLAN  Diagnoses and all orders for this visit:    1. Anxiety   Continue lexapro 10 mg every day-improved  2. FH: colon cancer   Colonoscopy q5 yrs-discussed  3. Acute sinusitis, recurrence not specified, unspecified location   zpak   Voice rest     otc decongestants  Other orders  -     azithromycin (ZITHROMAX) 250 mg tablet; Take 1 Tab by mouth See Admin Instructions for 5 days. Follow-up Disposition:  Return in about 6 months (around 5/1/2018) for anxiety a1c labs.

## 2017-11-18 ENCOUNTER — HOSPITAL ENCOUNTER (OUTPATIENT)
Dept: MAMMOGRAPHY | Age: 52
Discharge: HOME | End: 2017-11-18
Attending: INTERNAL MEDICINE
Payer: COMMERCIAL

## 2017-11-18 DIAGNOSIS — Z12.39 BREAST SCREENING: ICD-10-CM

## 2017-11-18 PROCEDURE — 77067 SCR MAMMO BI INCL CAD: CPT

## 2018-02-28 RX ORDER — ESCITALOPRAM OXALATE 10 MG/1
TABLET ORAL
Qty: 90 TAB | Refills: 1 | Status: SHIPPED | OUTPATIENT
Start: 2018-02-28 | End: 2018-04-12 | Stop reason: DRUGHIGH

## 2018-04-12 ENCOUNTER — OFFICE VISIT (OUTPATIENT)
Dept: INTERNAL MEDICINE CLINIC | Age: 53
End: 2018-04-12

## 2018-04-12 VITALS
SYSTOLIC BLOOD PRESSURE: 122 MMHG | HEIGHT: 63 IN | HEART RATE: 93 BPM | OXYGEN SATURATION: 98 % | DIASTOLIC BLOOD PRESSURE: 80 MMHG | TEMPERATURE: 98.4 F | WEIGHT: 208 LBS | BODY MASS INDEX: 36.86 KG/M2

## 2018-04-12 DIAGNOSIS — K76.0 HEPATIC STEATOSIS: ICD-10-CM

## 2018-04-12 DIAGNOSIS — F41.9 ANXIETY: ICD-10-CM

## 2018-04-12 DIAGNOSIS — R73.03 PREDIABETES: Primary | ICD-10-CM

## 2018-04-12 PROBLEM — E66.01 SEVERE OBESITY (BMI 35.0-39.9) WITH COMORBIDITY (HCC): Status: ACTIVE | Noted: 2018-04-12

## 2018-04-12 RX ORDER — ESCITALOPRAM OXALATE 20 MG/1
20 TABLET ORAL DAILY
Qty: 90 TAB | Refills: 3 | Status: SHIPPED | OUTPATIENT
Start: 2018-04-12 | End: 2019-02-27 | Stop reason: SDUPTHER

## 2018-04-12 NOTE — PROGRESS NOTES
Chief Complaint   Patient presents with    Cholesterol Problem     6 month follow up    Labs     non fasting    Medication Evaluation     lexapro

## 2018-04-12 NOTE — PROGRESS NOTES
HISTORY OF PRESENT ILLNESS  Tray Martinez is a 45 y.o. female. HPI   F/u anxiety, prediabetes , hepatic steatosis obesity  Had a fall and tore rigth ankle ligaments and tendons--orthopedic boot--Dr Diop. lexapro wears off by mid afternoon--anxiety . Stressed taking care of father who has dementia  Saw Dr Belvin Holter recently --labs were done-low Vit D level. Weight up several lbs-not able to exercise d/t ankle injury and work and taking care of father  Last visit  6 week f/u anxiety with palpitation, SOB and insomnia   Started on lexapro 10 mg every day 6 weeks, doing much better, no longer with panic attacks  Stress from her job and taking care of her parent. Parents will be moving into her house this weekend  Not having to use xanax  Started 2 d ago pressure in sisnus, headaches, some cough, voice now hoarse with sorethroat  Father recently dx with colon cancer at age 79--pt is getting colonoscopy q5 yrs already d/t hx ischemic colitis       Patient Active Problem List    Diagnosis Date Noted    FH: colon cancer 11/01/2017    Palpitation 02/17/2017    Hepatic steatosis 06/23/2016    Dislocation of prosthetic joint ([facility] [address] 07/25/2014    HLD (hyperlipidemia) 07/11/2014    Prediabetes 09/03/2013    Ischemic colitis ([facility] [address] 09/03/2013    DJD (degenerative joint disease) of knee 06/04/2013    Fibromyalgia 09/30/2011    Insomnia 09/30/2011     Current Outpatient Prescriptions   Medication Sig Dispense Refill    escitalopram oxalate (LEXAPRO) 10 mg tablet TAKE 1 TABLET DAILY 90 Tab 1    atorvastatin (LIPITOR) 10 mg tablet TAKE 1 TAB BY MOUTH DAILY. 90 Tab 3    triamterene-hydroCHLOROthiazide (MAXZIDE) 37.5-25 mg per tablet TAKE 1 TABLET DAILY 90 Tab 3    omeprazole (PRILOSEC) 20 mg capsule TAKE 1 CAPSULE DAILY 90 Cap 3    multivitamin (ONE A DAY) tablet Take 1 Tab by mouth daily.  naltrexone (DEPADE) 50 mg tablet Take 50 mg by mouth daily.       zolpidem (AMBIEN) 10 mg tablet TAKE 1 TABLET BY MOUTH AT BEDTIME AS NEEDED FOR SLEEP 30 tablet 3    cyclobenzaprine (FLEXERIL) 10 mg tablet Take 10 mg by mouth two (2) times a day.  cyanocobalamin (VITAMIN B-12) 1,000 mcg tablet Take 1,000 mcg by mouth daily.  EPINEPHrine (EPIPEN) 0.3 mg/0.3 mL injection 0.3 mL by IntraMUSCular route once as needed for 1 dose. 1 Each 1    gabapentin (NEURONTIN) 100 mg tablet Take 100 mg by mouth two (2) times a day.  Milnacipran (SAVELLA) 50 mg tablet Take 50 mg by mouth two (2) times a day. Allergies   Allergen Reactions    Latex Contact Dermatitis     Welts, redness, \"oozing\" per pt.  Strawberry Anaphylaxis    Demerol [Meperidine] Anaphylaxis    Hydromorphone Other (comments)     Nausea and hypotension    Lyrica [Pregabalin] Swelling     Feet and ankles and hands swelling    Morphine Other (comments)     Hypotension      Pcn [Penicillins] Hives      Lab Results  Component Value Date/Time   Hemoglobin A1c 6.1 (H) 09/05/2017 12:06 PM   Hemoglobin A1c 6.1 (H) 01/19/2017 09:06 AM   Hemoglobin A1c 6.3 (H) 07/10/2014 09:25 AM   Glucose 96 01/19/2017 09:06 AM   Glucose (POC) 86 06/09/2016 12:08 PM   LDL, calculated 105 (H) 09/05/2017 12:06 PM   Creatinine 0.61 01/19/2017 09:06 AM      Lab Results  Component Value Date/Time   Cholesterol, total 202 (H) 09/05/2017 12:06 PM   HDL Cholesterol 72 09/05/2017 12:06 PM   LDL, calculated 105 (H) 09/05/2017 12:06 PM   Triglyceride 126 09/05/2017 12:06 PM     Lab Results  Component Value Date/Time   GFR est non- 01/19/2017 09:06 AM   GFR est  01/19/2017 09:06 AM   Creatinine 0.61 01/19/2017 09:06 AM   BUN 29 (H) 01/19/2017 09:06 AM   Sodium 140 01/19/2017 09:06 AM   Potassium 4.4 01/19/2017 09:06 AM   Chloride 96 01/19/2017 09:06 AM   CO2 29 01/19/2017 09:06 AM        ROS    Physical Exam   Constitutional: She appears well-developed and well-nourished. Appears stated age, obese   Cardiovascular: Normal rate and regular rhythm.   Exam reveals no gallop and no friction rub. No murmur heard. Pulmonary/Chest: Effort normal and breath sounds normal. No respiratory distress. She has no wheezes. Abdominal: Soft. Bowel sounds are normal.   Musculoskeletal: She exhibits no edema. Neurological: She is alert. Psychiatric: She has a normal mood and affect. Nursing note and vitals reviewed. ASSESSMENT and PLAN  Diagnoses and all orders for this visit:    1. Prediabetes   Diet and exercise recommended   Request labs from rheum MD  2. Anxiety   Not controlled, increase lexapro 20 mg every day   Stop savella for Fibro, pt will f/u susanna SORTO--reports fibro under control  3. Hepatic steatosis   Weight reduction needed-discussed  4. Class 2 obesity with serious comorbidity and body mass index (BMI) of 35.0 to 35.9 in adult, unspecified obesity type   rx saxenda   I have reviewed/discussed the above normal BMI with the patient. I have recommended the following interventions: dietary management education, guidance, and counseling . [place] Other orders  -     escitalopram oxalate (LEXAPRO) 20 mg tablet; Take 1 Tab by mouth daily. -     liraglutide (SAXENDA) 3 mg/0.5 mL (18 mg/3 mL) pen; 0.5 mL by SubCUTAneous route daily. Follow-up Disposition:  Return in about 6 weeks (around 5/24/2018) for obesity anxiety.

## 2018-04-12 NOTE — MR AVS SNAPSHOT
[address] [facility]. 
[phone] Patient: Lucia Chong MRN:  [id number] Visit Information Date & Time Provider Department Dept. Phone Encounter #  
 4/12/2018  9:15 AM Aba Marx, [address] [place] [address],[address] [phone] [id number] Follow-up Instructions Return in about 6 weeks (around 5/24/2018) for obesity anxiety. Upcoming Health Maintenance Date Due FOBT Q 1 YEAR AGE 50-75 9/18/2013 BREAST CANCER SCRN MAMMOGRAM 11/18/2019 DTaP/Tdap/Td series (2 - Td) 6/10/2020 PAP AKA CERVICAL CYTOLOGY 1/22/2021 Allergies as of 4/12/2018  Review Complete On: 4/12/2018 By: Ellyn Weathers LPN Severity Noted Reaction Type Reactions Latex  06/03/2013    Contact Dermatitis Welts, redness, \"oozing\" per pt. [place] 05/23/2013    Anaphylaxis Demerol [Meperidine]  03/15/2010    Anaphylaxis Hydromorphone  07/26/2014   Side Effect Other (comments) Nausea and hypotension Lyrica [Pregabalin]  05/23/2013    Swelling Feet and ankles and hands swelling Morphine  03/15/2010    Other (comments) Hypotension Pcn [Penicillins]  03/15/2010    Hives Current Immunizations  Reviewed on 11/13/2014 Name Date HEP A/HEP B Combined Vaccine 12/7/2010, 6/8/2010 Hepatitis B Vaccine 5/4/2010 Influenza Vaccine 11/3/2014 PPD 4/26/2011,  Incomplete, 5/4/2010 Tdap 6/10/2010 Not reviewed this visit You Were Diagnosed With   
  
 Codes Comments Prediabetes    -  Primary ICD-10-CM: R73.03 
ICD-9-CM: 790.29 Anxiety     ICD-10-CM: F41.9 ICD-9-CM: 300.00 Hepatic steatosis     ICD-10-CM: K76.0 ICD-9-CM: 571.8 Vitals BP Pulse Temp Height(growth percentile) Weight(growth percentile) SpO2  
 122/80 (BP 1 Location: Left arm, BP Patient Position: Sitting) 93 98.4 °F (36.9 °C) (Oral) 5' 3\" (1.6 m) 208 lb (94.3 kg) 98% BMI OB Status Smoking Status 36.85 kg/m2 Hysterectomy Never Smoker BMI and BSA Data Body Mass Index Body Surface Area  
 36.85 kg/m 2 2.05 m 2 Preferred Pharmacy Pharmacy Name Phone [id number] [facility] [phone] Your Updated Medication List  
  
   
This list is accurate as of 4/12/18 10:25 AM.  Always use your most recent med list.  
  
  
  
  
 atorvastatin 10 mg tablet Commonly known as:  LIPITOR  
TAKE 1 TAB BY MOUTH DAILY. EPINEPHrine 0.3 mg/0.3 mL injection Commonly known as:  EPIPEN  
0.3 mL by IntraMUSCular route once as needed for 1 dose. escitalopram oxalate 20 mg tablet Commonly known as:  Fredick Herring Take 1 Tab by mouth daily. FLEXERIL 10 mg tablet Generic drug:  cyclobenzaprine Take 10 mg by mouth two (2) times a day.  
  
 gabapentin 100 mg tablet Commonly known as:  NEURONTIN Take 100 mg by mouth two (2) times a day. liraglutide 3 mg/0.5 mL (18 mg/3 mL) pen Commonly known as:  SAXENDA  
0.5 mL by SubCUTAneous route daily. multivitamin tablet Commonly known as:  ONE A DAY Take 1 Tab by mouth daily. naltrexone 50 mg tablet Commonly known as:  DEPADE Take 50 mg by mouth daily. omeprazole 20 mg capsule Commonly known as:  PRILOSEC  
TAKE 1 CAPSULE DAILY  
  
 triamterene-hydroCHLOROthiazide 37.5-25 mg per tablet Commonly known as:  [place]mon [place] TAKE 1 TABLET DAILY  
  
 VITAMIN B-12 1,000 mcg tablet Generic drug:  cyanocobalamin Take 1,000 mcg by mouth daily. zolpidem 10 mg tablet Commonly known as:  AMBIEN  
TAKE 1 TABLET BY MOUTH AT BEDTIME AS NEEDED FOR SLEEP Prescriptions Sent to Pharmacy Refills  
 escitalopram oxalate (LEXAPRO) 20 mg tablet 3 Sig: Take 1 Tab by mouth daily.   
 Class: Normal  
 Pharmacy: [address], [address] [facility]  [facility] Ph #: [phone] Route: Oral  
 liraglutide (SAXENDA) 3 mg/0.5 mL (18 mg/3 mL) pen 1 Si.5 mL by SubCUTAneous route daily. Class: Normal  
 [facility], [address] [place] [address] Ph #: [phone] Route: SubCUTAneous Follow-up Instructions Return in about 6 weeks (around 2018) for obesity anxiety. Introducing [facility]! Dear Luis Fernando Donnelly: Thank you for requesting a [employer] account. Our records indicate that you already have an active [employer] account. You can access your account anytime at [email or web address] Did you know that you can access your hospital and ER discharge instructions at any time in [employer]? You can also review all of your test results from your hospital stay or ER visit. Additional Information If you have questions, please visit the Frequently Asked Questions section of the [employer] website at [email or web address] Remember, [employer] is NOT to be used for urgent needs. For medical emergencies, dial 911. Now available from your iPhone and Android! Please provide this summary of care documentation to your next provider. Your primary care clinician is listed as Robert EDMONDS. If you have any questions after today's visit, please call [phone].

## 2018-04-27 RX ORDER — METFORMIN HYDROCHLORIDE 500 MG/1
500 TABLET ORAL 2 TIMES DAILY WITH MEALS
Qty: 60 TAB | Refills: 3 | Status: SHIPPED | OUTPATIENT
Start: 2018-04-27 | End: 2018-05-29 | Stop reason: SDUPTHER

## 2018-04-27 RX ORDER — DOXYCYCLINE 100 MG/1
100 TABLET ORAL 2 TIMES DAILY
Qty: 14 TAB | Refills: 0 | Status: SHIPPED | OUTPATIENT
Start: 2018-04-27 | End: 2018-05-23 | Stop reason: SDUPTHER

## 2018-05-22 NOTE — PROGRESS NOTES
HISTORY OF PRESENT ILLNESS  Gurpreet Santoyo is a 45 y.o. female. HPI   F/u anxiety, prediabetes , hepatic steatosis obesity  lexapro was increased to 20 mg every day last visit, working well until 3 pm then increased  saxenda was prescribed in addition to diet and exercise for obesity but could not afford the med--nonformulary  Lost 2 lbs since last visit  Difficulty having top make time to exercise with work and taking care of her parents  savella was discontinued    Last OV  Had a fall and tore rigth ankle ligaments and tendons--orthopedic boot--Dr Diop. lexapro wears off by mid afternoon--anxiety . Stressed taking care of father who has dementia  Saw Dr Donald Ortiz recently --labs were done-low Vit D level. Weight up several lbs-not able to exercise d/t ankle injury and work and taking care of father          Patient Active Problem List    Diagnosis Date Noted    Severe obesity (BMI 35.0-39. 9) with comorbidity ([facility] [address] 04/12/2018    FH: colon cancer 11/01/2017    Palpitation 02/17/2017    Hepatic steatosis 06/23/2016    Dislocation of prosthetic joint ([facility] [address] 07/25/2014    HLD (hyperlipidemia) 07/11/2014    Prediabetes 09/03/2013    Ischemic colitis ([facility] [address] 09/03/2013    DJD (degenerative joint disease) of knee 06/04/2013    Fibromyalgia 09/30/2011    Insomnia 09/30/2011     Current Outpatient Prescriptions   Medication Sig Dispense Refill    metFORMIN (GLUCOPHAGE) 500 mg tablet Take 1 Tab by mouth two (2) times daily (with meals). 60 Tab 3    doxycycline (ADOXA) 100 mg tablet Take 1 Tab by mouth two (2) times a day. 14 Tab 0    escitalopram oxalate (LEXAPRO) 20 mg tablet Take 1 Tab by mouth daily. 90 Tab 3    liraglutide (SAXENDA) 3 mg/0.5 mL (18 mg/3 mL) pen 0.5 mL by SubCUTAneous route daily. 1 Adjustable Dose Pre-filled Pen Syringe 1    atorvastatin (LIPITOR) 10 mg tablet TAKE 1 TAB BY MOUTH DAILY.  90 Tab 3    triamterene-hydroCHLOROthiazide (MAXZIDE) 37.5-25 mg per tablet TAKE 1 TABLET DAILY 90 Tab 3    omeprazole (PRILOSEC) 20 mg capsule TAKE 1 CAPSULE DAILY 90 Cap 3    multivitamin (ONE A DAY) tablet Take 1 Tab by mouth daily.  naltrexone (DEPADE) 50 mg tablet Take 50 mg by mouth daily.  zolpidem (AMBIEN) 10 mg tablet TAKE 1 TABLET BY MOUTH AT BEDTIME AS NEEDED FOR SLEEP 30 tablet 3    cyclobenzaprine (FLEXERIL) 10 mg tablet Take 10 mg by mouth two (2) times a day.  cyanocobalamin (VITAMIN B-12) 1,000 mcg tablet Take 1,000 mcg by mouth daily.  EPINEPHrine (EPIPEN) 0.3 mg/0.3 mL injection 0.3 mL by IntraMUSCular route once as needed for 1 dose. 1 Each 1    gabapentin (NEURONTIN) 100 mg tablet Take 100 mg by mouth two (2) times a day. Allergies   Allergen Reactions    Latex Contact Dermatitis     Welts, redness, \"oozing\" per pt.  Strawberry Anaphylaxis    Demerol [Meperidine] Anaphylaxis    Hydromorphone Other (comments)     Nausea and hypotension    Lyrica [Pregabalin] Swelling     Feet and ankles and hands swelling    Morphine Other (comments)     Hypotension      Pcn [Penicillins] Hives      Lab Results  Component Value Date/Time   GFR est non- 01/19/2017 09:06 AM   GFR est  01/19/2017 09:06 AM   Creatinine 0.61 01/19/2017 09:06 AM   BUN 29 (H) 01/19/2017 09:06 AM   Sodium 140 01/19/2017 09:06 AM   Potassium 4.4 01/19/2017 09:06 AM   Chloride 96 01/19/2017 09:06 AM   CO2 29 01/19/2017 09:06 AM     Lab Results  Component Value Date/Time   TSH 1.530 09/05/2017 12:06 PM         ROS    Physical Exam   Constitutional: She appears well-developed and well-nourished. Appears stated age, obesity   Cardiovascular: Normal rate, regular rhythm and normal heart sounds. Exam reveals no gallop and no friction rub. No murmur heard. Pulmonary/Chest: Effort normal and breath sounds normal. No respiratory distress. She has no wheezes. Abdominal: Soft. Bowel sounds are normal.   Musculoskeletal: She exhibits no edema. Neurological: She is alert.    Psychiatric: She has a normal mood and affect. Nursing note and vitals reviewed. ASSESSMENT and PLAN  Diagnoses and all orders for this visit:    1. Anxiety   Reasonable control on lecapro 20 mg every day--continue  2. Severe obesity (BMI 35.0-39. 9) with comorbidity [facility])  -     Ty Ser Endocrinology ref ED [facility]--? Short term adipex   Work on diet and exercise  3. Hyperlipidemia, unspecified hyperlipidemia type  -     METABOLIC PANEL, COMPREHENSIVE  -     LIPID PANEL   Continue statin  4. Prediabetes  -     HEMOGLOBIN A1C WITH EAG  -     METABOLIC PANEL, COMPREHENSIVE      Follow-up Disposition:  Return for anxiety prediabetes hld obesity.

## 2018-05-23 ENCOUNTER — OFFICE VISIT (OUTPATIENT)
Dept: INTERNAL MEDICINE CLINIC | Age: 53
End: 2018-05-23

## 2018-05-23 VITALS
SYSTOLIC BLOOD PRESSURE: 121 MMHG | HEIGHT: 63 IN | WEIGHT: 206 LBS | TEMPERATURE: 98.7 F | OXYGEN SATURATION: 97 % | HEART RATE: 93 BPM | DIASTOLIC BLOOD PRESSURE: 79 MMHG | BODY MASS INDEX: 36.5 KG/M2

## 2018-05-23 DIAGNOSIS — E66.01 SEVERE OBESITY (BMI 35.0-39.9) WITH COMORBIDITY (HCC): ICD-10-CM

## 2018-05-23 DIAGNOSIS — R73.03 PREDIABETES: ICD-10-CM

## 2018-05-23 DIAGNOSIS — E78.5 HYPERLIPIDEMIA, UNSPECIFIED HYPERLIPIDEMIA TYPE: ICD-10-CM

## 2018-05-23 DIAGNOSIS — F41.9 ANXIETY: Primary | ICD-10-CM

## 2018-05-23 NOTE — PROGRESS NOTES
Chief Complaint   Patient presents with    Anxiety     6 weeks follow up    Obesity     6 weeks follow up    Medication Refill     metformin 90 day supply

## 2018-05-23 NOTE — MR AVS SNAPSHOT
[address] 
[phone] Patient: Nick Lopes MRN:  [id number] Visit Information Date & Time Provider Department Dept. Phone Encounter #  
 5/23/2018  8:30 AM Yojana Anderson, [address] [place] [address],[address] [phone] [id number] Follow-up Instructions Return for anxiety prediabetes hld obesity. Upcoming Health Maintenance Date Due FOBT Q 1 YEAR AGE 50-75 9/18/2013 Influenza Age 5 to Adult 8/1/2018 BREAST CANCER SCRN MAMMOGRAM 11/18/2019 DTaP/Tdap/Td series (2 - Td) 6/10/2020 PAP AKA CERVICAL CYTOLOGY 1/22/2021 Allergies as of 5/23/2018  Review Complete On: 5/23/2018 By: Louie Townsend LPN Severity Noted Reaction Type Reactions Latex  06/03/2013    Contact Dermatitis Welts, redness, \"oozing\" per pt. [place] 05/23/2013    Anaphylaxis Demerol [Meperidine]  03/15/2010    Anaphylaxis Hydromorphone  07/26/2014   Side Effect Other (comments) Nausea and hypotension Lyrica [Pregabalin]  05/23/2013    Swelling Feet and ankles and hands swelling Morphine  03/15/2010    Other (comments) Hypotension Pcn [Penicillins]  03/15/2010    Hives Current Immunizations  Reviewed on 11/13/2014 Name Date HEP A/HEP B Combined Vaccine 12/7/2010, 6/8/2010 Hepatitis B Vaccine 5/4/2010 Influenza Vaccine 11/3/2014 PPD 4/26/2011,  Incomplete, 5/4/2010 Td 5/21/2018 Tdap 6/10/2010 Not reviewed this visit You Were Diagnosed With   
  
 Codes Comments Anxiety    -  Primary ICD-10-CM: F41.9 ICD-9-CM: 300.00 Severe obesity (BMI 35.0-39. 9) with comorbidity ([facility] [address]     ICD-10-CM: E66.01 
ICD-9-CM: 278.01 Hyperlipidemia, unspecified hyperlipidemia type     ICD-10-CM: E78.5 ICD-9-CM: 272.4 Prediabetes     ICD-10-CM: R73.03 
ICD-9-CM: 790.29 Vitals BP Pulse Temp Height(growth percentile) Weight(growth percentile) SpO2  
 121/79 (BP 1 Location: Left arm, BP Patient Position: Sitting) 93 98.7 °F (37.1 °C) (Oral) 5' 3\" (1.6 m) 206 lb (93.4 kg) 97% BMI OB Status Smoking Status 36.49 kg/m2 Hysterectomy Never Smoker BMI and BSA Data Body Mass Index Body Surface Area  
 36.49 kg/m 2 2.04 m 2 Preferred Pharmacy Pharmacy Name Phone [address] [facility] [phone] Your Updated Medication List  
  
   
This list is accurate as of 5/23/18  8:50 AM.  Always use your most recent med list.  
  
  
  
  
 atorvastatin 10 mg tablet Commonly known as:  LIPITOR  
TAKE 1 TAB BY MOUTH DAILY. EPINEPHrine 0.3 mg/0.3 mL injection Commonly known as:  EPIPEN  
0.3 mL by IntraMUSCular route once as needed for 1 dose. escitalopram oxalate 20 mg tablet Commonly known as:  Yesi Mealing Take 1 Tab by mouth daily. FLEXERIL 10 mg tablet Generic drug:  cyclobenzaprine Take 10 mg by mouth two (2) times a day.  
  
 gabapentin 100 mg tablet Commonly known as:  NEURONTIN Take 100 mg by mouth two (2) times a day. metFORMIN 500 mg tablet Commonly known as:  GLUCOPHAGE Take 1 Tab by mouth two (2) times daily (with meals). multivitamin tablet Commonly known as:  ONE A DAY Take 1 Tab by mouth daily. naltrexone 50 mg tablet Commonly known as:  DEPADE Take 50 mg by mouth daily. omeprazole 20 mg capsule Commonly known as:  PRILOSEC  
TAKE 1 CAPSULE DAILY  
  
 triamterene-hydroCHLOROthiazide 37.5-25 mg per tablet Commonly known as:  Anna Pluck TAKE 1 TABLET DAILY  
  
 VITAMIN B-12 1,000 mcg tablet Generic drug:  cyanocobalamin Take 1,000 mcg by mouth daily. zolpidem 10 mg tablet Commonly known as:  AMBIEN  
TAKE 1 TABLET BY MOUTH AT BEDTIME AS NEEDED FOR SLEEP We Performed the Following HEMOGLOBIN A1C WITH EAG [67806 CPT(R)] LIPID PANEL [44900 CPT(R)] METABOLIC PANEL, COMPREHENSIVE [87033 CPT(R)] REFERRAL TO ENDOCRINOLOGY [RPJ86 Custom] Follow-up Instructions Return for anxiety prediabetes hld obesity. Referral Information Referral ID Referred By Referred To  
  
 [id number] Levi EDMONDS MD   
   [address] [place] [address] [address] [place], [address] Phone: [phone] Fax: [phone] Visits Status Start Date End Date 1 New Request 5/23/18 5/23/19 If your referral has a status of pending review or denied, additional information will be sent to support the outcome of this decision. Introducing [facility]! Dear Abeba Winn: Thank you for requesting a Hedvig account. Our records indicate that you already have an active Hedvig account. You can access your account anytime at [email or web address] Did you know that you can access your hospital and ER discharge instructions at any time in Hedvig? You can also review all of your test results from your hospital stay or ER visit. Additional Information If you have questions, please visit the Frequently Asked Questions section of the Hedvig website at [email or web address] Remember, Hedvig is NOT to be used for urgent needs. For medical emergencies, dial 911. Now available from your iPhone and Android! Please provide this summary of care documentation to your next provider. Your primary care clinician is listed as Felix EDMONDS. If you have any questions after today's visit, please call [phone].

## 2018-05-24 LAB
ALBUMIN SERPL-MCNC: 4.4 G/DL (ref 3.5–5.5)
ALBUMIN/GLOB SERPL: 1.7 {RATIO} (ref 1.2–2.2)
ALP SERPL-CCNC: 112 IU/L (ref 39–117)
ALT SERPL-CCNC: 34 IU/L (ref 0–32)
AST SERPL-CCNC: 23 IU/L (ref 0–40)
BILIRUB SERPL-MCNC: 0.4 MG/DL (ref 0–1.2)
BUN SERPL-MCNC: 21 MG/DL (ref 6–24)
BUN/CREAT SERPL: 30 (ref 9–23)
CALCIUM SERPL-MCNC: 9.5 MG/DL (ref 8.7–10.2)
CHLORIDE SERPL-SCNC: 99 MMOL/L (ref 96–106)
CHOLEST SERPL-MCNC: 177 MG/DL (ref 100–199)
CO2 SERPL-SCNC: 28 MMOL/L (ref 18–29)
CREAT SERPL-MCNC: 0.69 MG/DL (ref 0.57–1)
EST. AVERAGE GLUCOSE BLD GHB EST-MCNC: 126 MG/DL
GFR SERPLBLD CREATININE-BSD FMLA CKD-EPI: 114 ML/MIN/1.73
GFR SERPLBLD CREATININE-BSD FMLA CKD-EPI: 99 ML/MIN/1.73
GLOBULIN SER CALC-MCNC: 2.6 G/DL (ref 1.5–4.5)
GLUCOSE SERPL-MCNC: 95 MG/DL (ref 65–99)
HBA1C MFR BLD: 6 % (ref 4.8–5.6)
HDLC SERPL-MCNC: 58 MG/DL
LDLC SERPL CALC-MCNC: 93 MG/DL (ref 0–99)
POTASSIUM SERPL-SCNC: 3.8 MMOL/L (ref 3.5–5.2)
PROT SERPL-MCNC: 7 G/DL (ref 6–8.5)
SODIUM SERPL-SCNC: 142 MMOL/L (ref 134–144)
TRIGL SERPL-MCNC: 129 MG/DL (ref 0–149)
VLDLC SERPL CALC-MCNC: 26 MG/DL (ref 5–40)

## 2018-05-29 DIAGNOSIS — Z79.4 CONTROLLED TYPE 2 DIABETES MELLITUS WITHOUT COMPLICATION, WITH LONG-TERM CURRENT USE OF INSULIN (HCC): Primary | ICD-10-CM

## 2018-05-29 DIAGNOSIS — E11.9 CONTROLLED TYPE 2 DIABETES MELLITUS WITHOUT COMPLICATION, WITH LONG-TERM CURRENT USE OF INSULIN (HCC): Primary | ICD-10-CM

## 2018-05-29 RX ORDER — METFORMIN HYDROCHLORIDE 500 MG/1
500 TABLET ORAL 2 TIMES DAILY WITH MEALS
Qty: 180 TAB | Refills: 3 | Status: SHIPPED | OUTPATIENT
Start: 2018-05-29 | End: 2019-05-13 | Stop reason: SDUPTHER

## 2018-05-29 NOTE — TELEPHONE ENCOUNTER
Requested Prescriptions     Pending Prescriptions Disp Refills    metFORMIN (GLUCOPHAGE) 500 mg tablet 180 Tab 3     Sig: Take 1 Tab by mouth two (2) times daily (with meals). PCP: Denise Donovan MD    Last appt: 5/23/2018  Future Appointments  Date Time Provider Law Saritha   11/21/2018 8:30 AM Denise Donovan MD [address]       Requested Prescriptions     Pending Prescriptions Disp Refills    metFORMIN (GLUCOPHAGE) 500 mg tablet 180 Tab 3     Sig: Take 1 Tab by mouth two (2) times daily (with meals).

## 2018-07-20 ENCOUNTER — HOSPITAL ENCOUNTER (OUTPATIENT)
Dept: PREADMISSION TESTING | Age: 53
Discharge: HOME | End: 2018-07-20
Payer: COMMERCIAL

## 2018-07-20 VITALS
OXYGEN SATURATION: 95 % | WEIGHT: 207.45 LBS | HEIGHT: 64 IN | DIASTOLIC BLOOD PRESSURE: 74 MMHG | SYSTOLIC BLOOD PRESSURE: 137 MMHG | RESPIRATION RATE: 18 BRPM | BODY MASS INDEX: 35.42 KG/M2 | HEART RATE: 98 BPM | TEMPERATURE: 98.4 F

## 2018-07-20 LAB
ANION GAP SERPL CALC-SCNC: 8 MMOL/L (ref 5–15)
ATRIAL RATE: 90 BPM
BUN SERPL-MCNC: 16 MG/DL (ref 6–20)
BUN/CREAT SERPL: 21 (ref 12–20)
CALCIUM SERPL-MCNC: 9 MG/DL (ref 8.5–10.1)
CALCULATED P AXIS, ECG09: 21 DEGREES
CALCULATED R AXIS, ECG10: 64 DEGREES
CALCULATED T AXIS, ECG11: 35 DEGREES
CHLORIDE SERPL-SCNC: 100 MMOL/L (ref 97–108)
CO2 SERPL-SCNC: 31 MMOL/L (ref 21–32)
CREAT SERPL-MCNC: 0.78 MG/DL (ref 0.55–1.02)
DIAGNOSIS, 93000: NORMAL
ERYTHROCYTE [DISTWIDTH] IN BLOOD BY AUTOMATED COUNT: 13.9 % (ref 11.5–14.5)
GLUCOSE SERPL-MCNC: 102 MG/DL (ref 65–100)
HCT VFR BLD AUTO: 38.5 % (ref 35–47)
HGB BLD-MCNC: 12.5 G/DL (ref 11.5–16)
MCH RBC QN AUTO: 28.5 PG (ref 26–34)
MCHC RBC AUTO-ENTMCNC: 32.5 G/DL (ref 30–36.5)
MCV RBC AUTO: 87.9 FL (ref 80–99)
NRBC # BLD: 0 K/UL (ref 0–0.01)
NRBC BLD-RTO: 0 PER 100 WBC
P-R INTERVAL, ECG05: 138 MS
PLATELET # BLD AUTO: 275 K/UL (ref 150–400)
PMV BLD AUTO: 10.5 FL (ref 8.9–12.9)
POTASSIUM SERPL-SCNC: 3.3 MMOL/L (ref 3.5–5.1)
Q-T INTERVAL, ECG07: 362 MS
QRS DURATION, ECG06: 84 MS
QTC CALCULATION (BEZET), ECG08: 442 MS
RBC # BLD AUTO: 4.38 M/UL (ref 3.8–5.2)
SODIUM SERPL-SCNC: 139 MMOL/L (ref 136–145)
VENTRICULAR RATE, ECG03: 90 BPM
WBC # BLD AUTO: 7.4 K/UL (ref 3.6–11)

## 2018-07-20 PROCEDURE — 36415 COLL VENOUS BLD VENIPUNCTURE: CPT | Performed by: PODIATRIST

## 2018-07-20 PROCEDURE — 80048 BASIC METABOLIC PNL TOTAL CA: CPT | Performed by: PODIATRIST

## 2018-07-20 PROCEDURE — 85027 COMPLETE CBC AUTOMATED: CPT | Performed by: PODIATRIST

## 2018-07-20 PROCEDURE — 93005 ELECTROCARDIOGRAM TRACING: CPT

## 2018-07-20 NOTE — PERIOP NOTES
[facility] Preoperative Instructions Surgery Date 7/27/2018          Time of Arrival  5:45 AM 
 
1. On the day of your surgery, please report to the Surgical Services Registration Desk and sign in at your designated time. The Surgery Center is located to the right of the Emergency Room. 2. You must have someone with you to drive you home. You should not drive a car for 24 hours following surgery. Please make arrangements for a friend or family member to stay with you for the first 24 hours after your surgery. 3. Do not have anything to eat or drink (including water, gum, mints, coffee, juice) after midnight 7/26/2018?? Oris Mazariegos ? This may not apply to medications prescribed by your physician. ?(Please note below the special instructions with medications to take the morning of your procedure.) 4. We recommend you do not drink any alcoholic beverages for 24 hours before and after your surgery. 5. Contact your surgeons office for instructions on the following medications: non-steroidal anti-inflammatory drugs (i.e. Advil, Aleve), vitamins, and supplements. (Some surgeons will want you to stop these medications prior to surgery and others may allow you to take them) **If you are currently taking Plavix, Coumadin, Aspirin and/or other blood-thinning agents, contact your surgeon for instructions. ** Your surgeon will partner with the physician prescribing these medications to determine if it is safe to stop or if you need to continue taking. Please do not stop taking these medications without instructions from your surgeon 6. Wear comfortable clothes. Wear glasses instead of contacts. Do not bring any money or jewelry. Please bring picture ID, insurance card, and any prearranged co-payment or hospital payment. Do not wear make-up, particularly mascara the morning of your surgery. Do not wear nail polish, particularly if you are having foot /hand surgery.   Wear your hair loose or down, no ponytails, buns, silvestre pins or clips. All body piercings must be removed. Please shower with antibacterial soap for three consecutive days before and on the morning of surgery, but do not apply any lotions, powders or deodorants after the shower on the day of surgery. Please use a fresh towels after each shower. Please sleep in clean clothes and change bed linens the night before surgery. Please do not shave for 48 hours prior to surgery. Shaving of the face is acceptable. 7. You should understand that if you do not follow these instructions your surgery may be cancelled. If your physical condition changes (I.e. fever, cold or flu) please contact your surgeon as soon as possible. 8. It is important that you be on time. If a situation occurs where you may be late, please call [phone] (OR Holding Area). 9. If you have any questions and or problems, please call [phone] (Pre-admission Testing). 10. Your surgery time may be subject to change. You will receive a phone call the evening prior if your time changes. 11.  If having outpatient surgery, you must have someone to drive you here, stay with you during the duration of your stay, and to drive you home at time of discharge. 12.   In an effort to improve the efficiency, privacy, and safety for all of our Pre-op patients visitors are not allowed in the Holding area. Once you arrive and are registered your family/visitors will be asked to remain in the waiting room. The Pre-op staff will get you from the Surgical Waiting Area and will explain to you and your family/visitors that the Pre-op phase is beginning. The staff will answer any questions and provide instructions for tracking of the patient, by use of the existing tracking number and color-coded status board in the waiting room.   At this time the staff will also ask for your designated spokesperson information in the event that the physician or staff need to provide an update or obtain any pertinent information. The designated spokesperson will be notified if the physician needs to speak to family during the pre-operative phase. If at any time your family/visitors has questions or concerns they may approach the volunteer desk in the waiting area for assistance. Special Instructions:None MEDICATIONS TO TAKE THE MORNING OF SURGERY WITH A SIP OF WATER:Cyclobenzaprine, Escitalopram, Naltrexone, Omeprazole, Triamterene-hydrochlorothiazide I understand a pre-operative phone call will be made to verify my surgery time. In the event that I am not available, I give permission for a message to be left on my answering service and/or with another person? Yes  
[phone] 
 
 
 ___________________      __________   _________ 
  (Signature of Patient)             (Witness)                (Date and Time)

## 2018-07-23 RX ORDER — POTASSIUM CHLORIDE 20 MEQ/1
20 TABLET, EXTENDED RELEASE ORAL 2 TIMES DAILY
Qty: 6 TAB | Refills: 0 | Status: SHIPPED | OUTPATIENT
Start: 2018-07-23 | End: 2018-07-26

## 2018-07-23 NOTE — ADVANCED PRACTICE NURSE
Msg sent to PCP regarding K+ of 3.3 in PAT assessment. Dr. Lizy Boggs will prescribe KCL 20 mEq BID x 3 days. Pt advised and states she will start today.

## 2018-07-26 NOTE — H&P
Katy Jesus GIBBONS  MR#: [id number]  : 1965  ACCOUNT #: [de-identified]   ADMIT DATE:     CHIEF COMPLAINT:  Painful right foot. HISTORY OF PRESENT ILLNESS:  This is a 57-year-old female patient who was responding well to her medications. She has had a fracture of the fifth metatarsal base which healed uneventfully, but had previous injury and pain along the peroneal tendons lateral aspect, right foot. She has been battling this for greater than 3 months, the fracture and injury to the right foot and metatarsal did not help this area. She continues to have pain along the tendon, but not the fifth metatarsal.    Recommending a direct repair with tubularization of the peroneal tendon and exploration of the area. Also having pain along the sinus tarsi, probably from previously existing plantar fasciitis. I am recommending a therapeutic cocktail injection to the subtalar joint during this procedure also. MEDICATIONS AND ALLERGIES:  Patient takes metformin, potassium chloride K-Dur, atorvastatin 10 mg, triamterene/hydrochlorothiazide, omeprazole 20 mg, multivitamin, naltrexone 50 mg, zolpidem 10 mg, Flexeril 10 mg, vitamin B12 and gabapentin 100 mg. ALLERGIES:  THE PATIENT HAS ALLERGIES TO STRAWBERRY, DEMEROL, HYDROMORPHONE, LYRICA, MORPHINE, PENICILLINS. PAST MEDICAL HISTORY:  Obesity, colon cancer, hepatic stenosis, dislocation of prosthetic joint in , hyperlipidemia, prediabetes, ischemic colitis, fibromyalgia, DJD of the knee, also insomnia. PAST SURGICAL HISTORY:  Appendectomy,  section, right foot surgery. FAMILY HISTORY:  The patient shows bilateral cataracts, essential hypertension, neurological disorder and Alzheimer disease on the paternal side, essential hypertension and acute arthritis on the maternal side.     SOCIAL HISTORY:  The patient never a smoker, does not have any history of recreational drug use nor alcohol use. She is very active on her feet during working activities, but has been nonweightbearing due to the previous trauma and tendinitis problem. REVIEW OF SYSTEMS:  CONSTITUTIONAL:  Well-developed, well-nourished. No distress. CARDIOVASCULAR:  Normal rate and rhythm, no palpitations. RESPIRATORY:  No wheezing, no chest tightness or shortness of breath. ABDOMEN:  Soft, bowel sounds are normal.  No organomegaly. INTEGUMENT:  Within normal limits, no pigmented lesions, no macerations of bilateral foot. NEUROLOGICAL:  Grossly intact lower extremities exam bilaterally. VASCULAR:  Palpable pedal pulses bilaterally. MUSCULOSKELETAL:  5/5 muscle strength seen. Significant pain with resistance on eversion. Strength testing of the right foot and ankle, specifically stressing the peroneal tendon, right side. No peroneal subluxation is seen on dorsiflexion and forced eversion right side. ASSESSMENT:    1. Peroneal tendon with tendon tear, right foot and ankle. 2.  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 PLAN:  The patient has failed exhaustive conservative treatment, specifically for the peroneal tendon. I am recommending tubularization with direct repair of the peroneal tendon, possibly utilizing adjacent tendon for tendon allograft. The patient understands the risks and possible complications to the procedures. Also recommend a therapeutic cocktail injection to the sinus tarsi subtalar joint, right foot. The patient understands she will be nonweightbearing for a few weeks after the procedure.       JAIRO Sanches  D: 07/25/2018 17:22     T: 07/25/2018 18:14  JOB #: [id number]

## 2018-07-27 ENCOUNTER — HOSPITAL ENCOUNTER (OUTPATIENT)
Age: 53
Setting detail: OUTPATIENT SURGERY
Discharge: HOME | End: 2018-07-27
Attending: PODIATRIST | Admitting: PODIATRIST
Payer: COMMERCIAL

## 2018-07-27 ENCOUNTER — ANESTHESIA (OUTPATIENT)
Dept: SURGERY | Age: 53
End: 2018-07-27
Payer: COMMERCIAL

## 2018-07-27 ENCOUNTER — ANESTHESIA EVENT (OUTPATIENT)
Dept: SURGERY | Age: 53
End: 2018-07-27
Payer: COMMERCIAL

## 2018-07-27 VITALS
WEIGHT: 207.45 LBS | SYSTOLIC BLOOD PRESSURE: 102 MMHG | HEART RATE: 82 BPM | TEMPERATURE: 99.2 F | RESPIRATION RATE: 16 BRPM | HEIGHT: 64 IN | DIASTOLIC BLOOD PRESSURE: 60 MMHG | OXYGEN SATURATION: 100 % | BODY MASS INDEX: 35.42 KG/M2

## 2018-07-27 LAB
ANION GAP BLD CALC-SCNC: 19 MMOL/L (ref 10–20)
BUN BLD-MCNC: 24 MG/DL (ref 9–20)
CA-I BLD-MCNC: 1.15 MMOL/L (ref 1.12–1.32)
CHLORIDE BLD-SCNC: 98 MMOL/L (ref 98–107)
CO2 BLD-SCNC: 29 MMOL/L (ref 21–32)
CREAT BLD-MCNC: 0.8 MG/DL (ref 0.6–1.3)
GLUCOSE BLD-MCNC: 113 MG/DL (ref 65–100)
HCT VFR BLD CALC: 41 % (ref 35–47)
POTASSIUM BLD-SCNC: 3.4 MMOL/L (ref 3.5–5.1)
SERVICE CMNT-IMP: ABNORMAL
SODIUM BLD-SCNC: 141 MMOL/L (ref 136–145)

## 2018-07-27 PROCEDURE — 80047 BASIC METABLC PNL IONIZED CA: CPT

## 2018-07-27 PROCEDURE — 74011250636 HC RX REV CODE- 250/636: Performed by: ANESTHESIOLOGY

## 2018-07-27 PROCEDURE — 74011250636 HC RX REV CODE- 250/636

## 2018-07-27 PROCEDURE — 77030028224 HC PDNG CST BSNM -A: Performed by: PODIATRIST

## 2018-07-27 PROCEDURE — 76010000149 HC OR TIME 1 TO 1.5 HR: Performed by: PODIATRIST

## 2018-07-27 PROCEDURE — 77030000032 HC CUF TRNQT ZIMM -B: Performed by: PODIATRIST

## 2018-07-27 PROCEDURE — 77030031139 HC SUT VCRL2 J&J -A: Performed by: PODIATRIST

## 2018-07-27 PROCEDURE — 76060000033 HC ANESTHESIA 1 TO 1.5 HR: Performed by: PODIATRIST

## 2018-07-27 PROCEDURE — 77030018836 HC SOL IRR NACL ICUM -A: Performed by: PODIATRIST

## 2018-07-27 PROCEDURE — 77030002912 HC SUT ETHBND J&J -A: Performed by: PODIATRIST

## 2018-07-27 PROCEDURE — 77030011640 HC PAD GRND REM COVD -A: Performed by: PODIATRIST

## 2018-07-27 PROCEDURE — 74011250636 HC RX REV CODE- 250/636: Performed by: PODIATRIST

## 2018-07-27 PROCEDURE — 76210000006 HC OR PH I REC 0.5 TO 1 HR: Performed by: PODIATRIST

## 2018-07-27 PROCEDURE — 77030019908 HC STETH ESOPH SIMS -A: Performed by: ANESTHESIOLOGY

## 2018-07-27 PROCEDURE — 77030002916 HC SUT ETHLN J&J -A: Performed by: PODIATRIST

## 2018-07-27 PROCEDURE — 76210000021 HC REC RM PH II 0.5 TO 1 HR: Performed by: PODIATRIST

## 2018-07-27 PROCEDURE — 77030008684 HC TU ET CUF COVD -B: Performed by: ANESTHESIOLOGY

## 2018-07-27 PROCEDURE — 74011000250 HC RX REV CODE- 250

## 2018-07-27 PROCEDURE — 77030026438 HC STYL ET INTUB CARD -A: Performed by: ANESTHESIOLOGY

## 2018-07-27 PROCEDURE — 77030032490 HC SLV COMPR SCD KNE COVD -B: Performed by: PODIATRIST

## 2018-07-27 PROCEDURE — 77030020782 HC GWN BAIR PAWS FLX 3M -B

## 2018-07-27 PROCEDURE — 74011000250 HC RX REV CODE- 250: Performed by: PODIATRIST

## 2018-07-27 PROCEDURE — 77030006907 HC BLD SNUS SHV MEDT -C: Performed by: PODIATRIST

## 2018-07-27 RX ORDER — HYDROMORPHONE HYDROCHLORIDE 1 MG/ML
0.5 INJECTION, SOLUTION INTRAMUSCULAR; INTRAVENOUS; SUBCUTANEOUS
Status: DISCONTINUED | OUTPATIENT
Start: 2018-07-27 | End: 2018-07-27 | Stop reason: HOSPADM

## 2018-07-27 RX ORDER — MIDAZOLAM HYDROCHLORIDE 1 MG/ML
0.5 INJECTION, SOLUTION INTRAMUSCULAR; INTRAVENOUS
Status: DISCONTINUED | OUTPATIENT
Start: 2018-07-27 | End: 2018-07-27 | Stop reason: HOSPADM

## 2018-07-27 RX ORDER — SODIUM CHLORIDE, SODIUM LACTATE, POTASSIUM CHLORIDE, CALCIUM CHLORIDE 600; 310; 30; 20 MG/100ML; MG/100ML; MG/100ML; MG/100ML
25 INJECTION, SOLUTION INTRAVENOUS CONTINUOUS
Status: DISCONTINUED | OUTPATIENT
Start: 2018-07-27 | End: 2018-07-27 | Stop reason: HOSPADM

## 2018-07-27 RX ORDER — BUPIVACAINE HYDROCHLORIDE 5 MG/ML
INJECTION, SOLUTION EPIDURAL; INTRACAUDAL AS NEEDED
Status: DISCONTINUED | OUTPATIENT
Start: 2018-07-27 | End: 2018-07-27 | Stop reason: HOSPADM

## 2018-07-27 RX ORDER — SODIUM CHLORIDE 0.9 % (FLUSH) 0.9 %
5-10 SYRINGE (ML) INJECTION EVERY 8 HOURS
Status: DISCONTINUED | OUTPATIENT
Start: 2018-07-27 | End: 2018-07-27 | Stop reason: HOSPADM

## 2018-07-27 RX ORDER — MORPHINE SULFATE 10 MG/ML
2 INJECTION, SOLUTION INTRAMUSCULAR; INTRAVENOUS
Status: DISCONTINUED | OUTPATIENT
Start: 2018-07-27 | End: 2018-07-27 | Stop reason: HOSPADM

## 2018-07-27 RX ORDER — ONDANSETRON 2 MG/ML
4 INJECTION INTRAMUSCULAR; INTRAVENOUS AS NEEDED
Status: DISCONTINUED | OUTPATIENT
Start: 2018-07-27 | End: 2018-07-27 | Stop reason: HOSPADM

## 2018-07-27 RX ORDER — SODIUM CHLORIDE, SODIUM LACTATE, POTASSIUM CHLORIDE, CALCIUM CHLORIDE 600; 310; 30; 20 MG/100ML; MG/100ML; MG/100ML; MG/100ML
100 INJECTION, SOLUTION INTRAVENOUS CONTINUOUS
Status: DISCONTINUED | OUTPATIENT
Start: 2018-07-27 | End: 2018-07-27 | Stop reason: HOSPADM

## 2018-07-27 RX ORDER — ROCURONIUM BROMIDE 10 MG/ML
INJECTION, SOLUTION INTRAVENOUS AS NEEDED
Status: DISCONTINUED | OUTPATIENT
Start: 2018-07-27 | End: 2018-07-27 | Stop reason: HOSPADM

## 2018-07-27 RX ORDER — LIDOCAINE HYDROCHLORIDE 20 MG/ML
INJECTION, SOLUTION EPIDURAL; INFILTRATION; INTRACAUDAL; PERINEURAL AS NEEDED
Status: DISCONTINUED | OUTPATIENT
Start: 2018-07-27 | End: 2018-07-27 | Stop reason: HOSPADM

## 2018-07-27 RX ORDER — HYDROMORPHONE HYDROCHLORIDE 1 MG/ML
1 INJECTION, SOLUTION INTRAMUSCULAR; INTRAVENOUS; SUBCUTANEOUS
Status: DISCONTINUED | OUTPATIENT
Start: 2018-07-27 | End: 2018-07-27 | Stop reason: HOSPADM

## 2018-07-27 RX ORDER — MIDAZOLAM HYDROCHLORIDE 1 MG/ML
1 INJECTION, SOLUTION INTRAMUSCULAR; INTRAVENOUS AS NEEDED
Status: DISCONTINUED | OUTPATIENT
Start: 2018-07-27 | End: 2018-07-27 | Stop reason: HOSPADM

## 2018-07-27 RX ORDER — SUCCINYLCHOLINE CHLORIDE 20 MG/ML
INJECTION INTRAMUSCULAR; INTRAVENOUS AS NEEDED
Status: DISCONTINUED | OUTPATIENT
Start: 2018-07-27 | End: 2018-07-27 | Stop reason: HOSPADM

## 2018-07-27 RX ORDER — ONDANSETRON 2 MG/ML
INJECTION INTRAMUSCULAR; INTRAVENOUS AS NEEDED
Status: DISCONTINUED | OUTPATIENT
Start: 2018-07-27 | End: 2018-07-27 | Stop reason: HOSPADM

## 2018-07-27 RX ORDER — SODIUM CHLORIDE 0.9 % (FLUSH) 0.9 %
5-10 SYRINGE (ML) INJECTION AS NEEDED
Status: DISCONTINUED | OUTPATIENT
Start: 2018-07-27 | End: 2018-07-27 | Stop reason: HOSPADM

## 2018-07-27 RX ORDER — SODIUM CHLORIDE 9 MG/ML
25 INJECTION, SOLUTION INTRAVENOUS CONTINUOUS
Status: DISCONTINUED | OUTPATIENT
Start: 2018-07-27 | End: 2018-07-27 | Stop reason: HOSPADM

## 2018-07-27 RX ORDER — CLINDAMYCIN PHOSPHATE 900 MG/50ML
900 INJECTION INTRAVENOUS ONCE
Status: COMPLETED | OUTPATIENT
Start: 2018-07-27 | End: 2018-07-27

## 2018-07-27 RX ORDER — FENTANYL CITRATE 50 UG/ML
INJECTION, SOLUTION INTRAMUSCULAR; INTRAVENOUS AS NEEDED
Status: DISCONTINUED | OUTPATIENT
Start: 2018-07-27 | End: 2018-07-27 | Stop reason: HOSPADM

## 2018-07-27 RX ORDER — DIPHENHYDRAMINE HYDROCHLORIDE 50 MG/ML
12.5 INJECTION, SOLUTION INTRAMUSCULAR; INTRAVENOUS AS NEEDED
Status: DISCONTINUED | OUTPATIENT
Start: 2018-07-27 | End: 2018-07-27 | Stop reason: HOSPADM

## 2018-07-27 RX ORDER — LIDOCAINE HYDROCHLORIDE 10 MG/ML
0.1 INJECTION, SOLUTION EPIDURAL; INFILTRATION; INTRACAUDAL; PERINEURAL AS NEEDED
Status: DISCONTINUED | OUTPATIENT
Start: 2018-07-27 | End: 2018-07-27 | Stop reason: HOSPADM

## 2018-07-27 RX ORDER — METHYLPREDNISOLONE ACETATE 40 MG/ML
INJECTION, SUSPENSION INTRA-ARTICULAR; INTRALESIONAL; INTRAMUSCULAR; SOFT TISSUE AS NEEDED
Status: DISCONTINUED | OUTPATIENT
Start: 2018-07-27 | End: 2018-07-27 | Stop reason: HOSPADM

## 2018-07-27 RX ORDER — ROPIVACAINE HYDROCHLORIDE 5 MG/ML
30 INJECTION, SOLUTION EPIDURAL; INFILTRATION; PERINEURAL AS NEEDED
Status: DISCONTINUED | OUTPATIENT
Start: 2018-07-27 | End: 2018-07-27 | Stop reason: HOSPADM

## 2018-07-27 RX ORDER — FENTANYL CITRATE 50 UG/ML
50 INJECTION, SOLUTION INTRAMUSCULAR; INTRAVENOUS AS NEEDED
Status: DISCONTINUED | OUTPATIENT
Start: 2018-07-27 | End: 2018-07-27 | Stop reason: HOSPADM

## 2018-07-27 RX ORDER — PROPOFOL 10 MG/ML
INJECTION, EMULSION INTRAVENOUS AS NEEDED
Status: DISCONTINUED | OUTPATIENT
Start: 2018-07-27 | End: 2018-07-27 | Stop reason: HOSPADM

## 2018-07-27 RX ORDER — BUPIVACAINE HYDROCHLORIDE AND EPINEPHRINE 5; 5 MG/ML; UG/ML
INJECTION, SOLUTION EPIDURAL; INTRACAUDAL; PERINEURAL AS NEEDED
Status: DISCONTINUED | OUTPATIENT
Start: 2018-07-27 | End: 2018-07-27 | Stop reason: HOSPADM

## 2018-07-27 RX ORDER — ACETAMINOPHEN 10 MG/ML
INJECTION, SOLUTION INTRAVENOUS AS NEEDED
Status: DISCONTINUED | OUTPATIENT
Start: 2018-07-27 | End: 2018-07-27 | Stop reason: HOSPADM

## 2018-07-27 RX ORDER — FENTANYL CITRATE 50 UG/ML
25 INJECTION, SOLUTION INTRAMUSCULAR; INTRAVENOUS
Status: DISCONTINUED | OUTPATIENT
Start: 2018-07-27 | End: 2018-07-27 | Stop reason: HOSPADM

## 2018-07-27 RX ORDER — MIDAZOLAM HYDROCHLORIDE 1 MG/ML
INJECTION, SOLUTION INTRAMUSCULAR; INTRAVENOUS AS NEEDED
Status: DISCONTINUED | OUTPATIENT
Start: 2018-07-27 | End: 2018-07-27 | Stop reason: HOSPADM

## 2018-07-27 RX ADMIN — SUCCINYLCHOLINE CHLORIDE 120 MG: 20 INJECTION INTRAMUSCULAR; INTRAVENOUS at 07:59

## 2018-07-27 RX ADMIN — ACETAMINOPHEN 1000 MG: 10 INJECTION, SOLUTION INTRAVENOUS at 08:48

## 2018-07-27 RX ADMIN — ROCURONIUM BROMIDE 5 MG: 10 INJECTION, SOLUTION INTRAVENOUS at 07:59

## 2018-07-27 RX ADMIN — MIDAZOLAM HYDROCHLORIDE 2 MG: 1 INJECTION, SOLUTION INTRAMUSCULAR; INTRAVENOUS at 07:30

## 2018-07-27 RX ADMIN — PROPOFOL 150 MG: 10 INJECTION, EMULSION INTRAVENOUS at 07:59

## 2018-07-27 RX ADMIN — PROPOFOL 50 MG: 10 INJECTION, EMULSION INTRAVENOUS at 07:38

## 2018-07-27 RX ADMIN — FENTANYL CITRATE 50 MCG: 50 INJECTION, SOLUTION INTRAMUSCULAR; INTRAVENOUS at 07:32

## 2018-07-27 RX ADMIN — LIDOCAINE HYDROCHLORIDE 40 MG: 20 INJECTION, SOLUTION EPIDURAL; INFILTRATION; INTRACAUDAL; PERINEURAL at 07:59

## 2018-07-27 RX ADMIN — FENTANYL CITRATE 25 MCG: 50 INJECTION, SOLUTION INTRAMUSCULAR; INTRAVENOUS at 07:38

## 2018-07-27 RX ADMIN — MIDAZOLAM HYDROCHLORIDE 2 MG: 1 INJECTION, SOLUTION INTRAMUSCULAR; INTRAVENOUS at 07:44

## 2018-07-27 RX ADMIN — PROPOFOL 50 MG: 10 INJECTION, EMULSION INTRAVENOUS at 08:35

## 2018-07-27 RX ADMIN — SODIUM CHLORIDE, SODIUM LACTATE, POTASSIUM CHLORIDE, AND CALCIUM CHLORIDE 25 ML/HR: 600; 310; 30; 20 INJECTION, SOLUTION INTRAVENOUS at 06:55

## 2018-07-27 RX ADMIN — FENTANYL CITRATE 25 MCG: 50 INJECTION, SOLUTION INTRAMUSCULAR; INTRAVENOUS at 07:40

## 2018-07-27 RX ADMIN — PROPOFOL 50 MG: 10 INJECTION, EMULSION INTRAVENOUS at 07:45

## 2018-07-27 RX ADMIN — ONDANSETRON 4 MG: 2 INJECTION INTRAMUSCULAR; INTRAVENOUS at 08:48

## 2018-07-27 RX ADMIN — MIDAZOLAM HYDROCHLORIDE 2 MG: 1 INJECTION, SOLUTION INTRAMUSCULAR; INTRAVENOUS at 07:36

## 2018-07-27 RX ADMIN — CLINDAMYCIN PHOSPHATE 600 MG: 18 INJECTION, SOLUTION INTRAVENOUS at 08:05

## 2018-07-27 RX ADMIN — LIDOCAINE HYDROCHLORIDE 40 MG: 20 INJECTION, SOLUTION EPIDURAL; INFILTRATION; INTRACAUDAL; PERINEURAL at 07:38

## 2018-07-27 NOTE — OP NOTES
[address] REPORT    Юлия Pearce  MR#: [id number]  : 1965  ACCOUNT #: [de-identified]   DATE OF SERVICE: 2018    PREOPERATIVE DIAGNOSIS:    1. Peroneal tendon tear, right ankle. 2.  Sinus tarsi syndrome/synovitis subtalar joint, right foot. 3.  Fracture of 5th metatarsal, right foot. POSTOPERATIVE DIAGNOSIS:    1. Peroneal tendon tear, right ankle. 2.  Sinus tarsi syndrome/synovitis subtalar joint, right foot. 3.  Fracture of 5th metatarsal, right foot. PROCEDURES PERFORMED:    1. Repair with tubularization of peroneal brevis tendon, right foot. 2.  Therapeutic injection sinus tarsi, right foot. ANESTHESIA:  General.    HEMOSTASIS:  Ankle tourniquet right side. INJECTABLES:  Popliteal block was attempted preoperatively, but patient was unable to tolerate. The patient had a local block around the ankle with 20 mL of 0.5% Marcaine with epinephrine. Also, a therapeutic cocktail injection, about 3 mL combination of 1 mL of Depo-Medrol, 2 mL of 0.5% Marcaine plain, to the subtalar joint and sinus tarsi right foot. ESTIMATED BLOOD LOSS:  Less than 5 mL. SPECIMENS REMOVED:  None. COMPLICATIONS:  None apparent. IMPLANTS:  None      SURGEON:    Leobardo Diop DPM    ASSISTANT:      OPERATIVE TECHNIQUE:  The patient was brought to the operating room and placed on the operating table in the supine position. A bump was placed along the hip, right side, to create a slight lateral decubitus position with good exposure along the lateral foot and ankle. The right foot and ankle were then prepped and draped in the usual aseptic technique and a mid calf tourniquet was utilized, right side, set at 250 mmHg. Exsanguination was completed after prepping and draping of the foot and ankle, and used throughout the procedure, once the tourniquet was up.     Access was gained to the lateral malleolus, where just posterior to this region, incision was begun and curvilinear down to the area just approximately 2 cm proximal to the 5th metatarsal base. The patient had had a fracture along the tuberosity, which was healing with immobilization and nonweightbearing. However, has significant pain along this area. This was followed and access was gained past subcutaneous tissues with cauterization of all superficial bleeders. Neurovascular structures were easily identified and retracted gently out of the way. Access was gained into the peroneal tendon sheath with easy deliverance of the peroneus longus and peroneus brevis into the surgical site. Peroneus longus was assessed and range of motion was assessed, found to be intact, without any nonviable tissue. Normal tubular appearance was seen. Peroneus brevis was followed proximally and found at the tip of the lateral malleolus to have a tear, which extended 1.8 cm distally. Also, the tendon appeared to be flattened in this area with some hypertrophy centrally. This hypertrophy and nonviable tissue was excised sharply with a #15 blade. Also, with inversion of the foot and retraction of the tissue, a low-lying muscle belly was seen retromalleolar along the lateral malleolus. This was removed with cutting with cautery and sharply with #15 blade also. The area was coapted with Bovie after this. Care was taken not to violate the tendon. At this point, the repair of the tendon was complete, but tubularization was carried out with 2-0 Ethibond in a running suture fashion. Once this was completed, the tendons were again examined and brought through range of motion along the right foot and ankle and found to be tracking normally along the lateral malleolus, as well as the lateral calcaneus. The area was copiously irrigated with sterile saline solution. The peroneal sulcus, peroneal capsule, and ligamentous tissues were examined and found to be viable. These were reapproximated and coapted with 2-0 Vicryl.   The area was again copiously irrigated with sterile saline solution. Any further bleeders were again cauterized as necessary at this point. The skin was then reapproximated and coapted with 5-0 nylon in simple interrupted and horizontal mattress fashion along the incision of lateral right foot and ankle. Postoperative block to the subtalar joint was then placed at this point. The tourniquet was deflated, showing immediate hyperemic flush to digits 1 through 5 on the right side. Dressing was completed with Xeroform, 4 x 4, 4-inch Kerlix. Also, a 4-inch x 30 inch Orthoglass posterior splint was placed along a padded right foot and ankle and set at 90 degrees neutral position. This was reinforced with a 5-inch Ace wrap. Patient tolerated the above procedures and anesthesia well and was discharged from the operating room with vital signs stable and vascular status intact to the right foot and ankle. Prognosis for this patient is satisfactory.       JAIRO Foy  D: 07/27/2018 09:45     T: 07/27/2018 10:28  JOB #: [id number]

## 2018-07-27 NOTE — DISCHARGE INSTRUCTIONS
-Elevate, ice to behind the knee right  -Do not get cast wet  -Take all medications as prescribed  -No weight to right foot  DISCHARGE SUMMARY from Nurse    PATIENT INSTRUCTIONS:    After general anesthesia or intravenous sedation, for 24 hours or while taking prescription Narcotics:  · Limit your activities  · Do not drive and operate hazardous machinery  · Do not make important personal or business decisions  · Do  not drink alcoholic beverages  · If you have not urinated within 8 hours after discharge, please contact your surgeon on call. Report the following to your surgeon:  · Excessive pain, swelling, redness or odor of or around the surgical area  · Temperature over 100.5  · Nausea and vomiting lasting longer than 4 hours or if unable to take medications  · Any signs of decreased circulation or nerve impairment to extremity: change in color, persistent  numbness, tingling, coldness or increase pain  · Any questions    What to do at Home:  A common side effect of anesthesia following surgery is nausea and/or vomiting. In order to decrease symptoms, it is wise to avoid foods that are high in fat, greasy foods, milk products, and spicy foods for the first 24 hours. Acceptable foods for the first 24 hours following surgery include but are not limited to:     soup   broth    toast    crackers    applesauce    bananas    mashed potatoes,   soft or scrambled eggs   oatmeal    jello    It is important to eat when taking your pain medication. This will help to prevent nausea. If possible, please try to time your meals with your medications. It is very important to stay hydrated following surgery. Sip fluids frequently while awake. Avoid acidic drinks such as citrus juices and soda for 24 hours. Carbonated beverages may cause bloating and gas.  Acceptable fluids include:    - water (flavor packets may add variety)  - coffee or tea (in moderation)  - Gatorade  - Taras-aid  - apple juice  - cranberry juice    You are encouraged to cough and deep breathe every hour when awake. This will help to prevent respiratory complications following anesthesia. You may want to hug a pillow when coughing and sneezing to add additional support to the surgical area and to decrease discomfort if you had abdominal or chest surgery. If you are discharged home with support stockings, you may remove them after 24 hours. Support stockings are used to help prevent blood clots in the legs following surgery. Please take time to review all of your Home Care Instructions and Medication Information sheets provided in your discharge packet. If you have any questions, please contact your surgeons office. Thank you. Learning About How to Use Crutches  Your Care Instructions  Crutches can help you walk when you have an injured hip, leg, knee, ankle, or foot. Your doctor will tell you how much weight-if any-you can put on your leg. Be sure your crutches fit you. When you stand up in your normal posture, there should be space for two or three fingers between the top of the crutch and your armpit. When you let your hands hang down, the hand  should be at your wrists. When you put your hands on the hand , your elbows should be slightly bent. To stay safe when using crutches:  · Look straight ahead, not down at your feet. · Clear away small rugs, cords, or anything else that could cause you to trip, slip, or fall. · Be very careful around pets and small children. They can get in your path when you least expect it. · Be sure the rubber tips on your crutches are clean and in good condition to help prevent slipping. · Avoid slick conditions, such as wet floors and snowy or icy driveways. In bad weather, be especially careful on curbs and steps. How to use crutches  Getting ready to walk    6. Bend your elbows slightly. Press the padded top parts of the crutches against your sides, under your armpits.   7. If you have been told not to put any weight on your injured leg, keep that leg bent and off the ground. Walking with crutches    1. Put both crutches about 12 inches in front of you. 2. Put your weight on the handgrips, not on the pads under your arms. (Constant pressure against your underarms can cause numbness.) Swing your body forward. (If you have been told not to put any weight on your injured leg, keep that leg bent and off the ground.)  3. To complete the step, put your weight on the strong leg. 4. Move your crutches about 12 inches in front of you, and start the next step. 5. When you're confident using the crutches, you can move the crutches and your injured leg at the same time. Then push straight down on the crutches as you step past the crutches with your strong leg, as you would in normal walking. 6. Take small steps. 7. Use ramps and elevators when you can. Sitting down    1. To sit, back up to the chair. Use one hand to hold both crutches by the handgrips, beside your injured leg. With the other hand, hold onto the seat and slowly lower yourself onto the chair. 2. Lay the crutches on the ground near your chair. If you prop them up, they may fall over. Getting up from a chair    1. To get up from a chair,  the crutches and put them in one hand beside your injured leg. 2. Put your weight on the handgrips of the crutches and on your strong leg to stand up. Walking up stairs    1. To go up stairs, step up with your strong leg and then bring the crutches and your injured leg to the upper step. 2. For stairs that have handrails: Put both crutches under the arm opposite the handrail. Use the hand opposite the handrail to hold both crutches by the handgrips. 3. Hold onto the handrail as you go up. Put your strong leg on the step first when you go up. Walking down stairs    1. To go down stairs, put your crutches and injured leg on the lower step. 2. Bring your strong leg to the lower step.  This saying may help you remember: \"Up with the good, down with the bad. \"  3. For stairs that have handrails: Put both crutches under the arm opposite the handrail. Use the hand opposite the handrail to hold both crutches by the handgrips. Hold onto the handrail as you go down. Follow the same process you use for stairs: Lead with your crutches and injured leg on the way down. Follow-up care is a key part of your treatment and safety. Be sure to make and go to all appointments, and call your doctor if you are having problems. It's also a good idea to know your test results and keep a list of the medicines you take. Where can you learn more? Go to http://fior-amy.info/. Enter W065 in the search box to learn more about \"Learning About How to Use Crutches. \"  Current as of: November 29, 2017  Content Version: 11.7  © 2189-3009 [employer]. Care instructions adapted under license by [employer] (which disclaims liability or warranty for this information). If you have questions about a medical condition or this instruction, always ask your healthcare professional. [address] any warranty or liability for your use of this information. Please carry medication information at all times in case of emergency situations. These are general instructions for a healthy lifestyle:    No smoking/ No tobacco products/ Avoid exposure to second hand smoke  Surgeon General's Warning:  Quitting smoking now greatly reduces serious risk to your health.     Obesity, smoking, and sedentary lifestyle greatly increases your risk for illness    A healthy diet, regular physical exercise & weight monitoring are important for maintaining a healthy lifestyle    You may be retaining fluid if you have a history of heart failure or if you experience any of the following symptoms:  Weight gain of 3 pounds or more overnight or 5 pounds in a week, increased swelling in our hands or feet or shortness of breath while lying flat in bed. Please call your doctor as soon as you notice any of these symptoms; do not wait until your next office visit. Recognize signs and symptoms of STROKE:    F-face looks uneven    A-arms unable to move or move unevenly    S-speech slurred or non-existent    T-time-call 911 as soon as signs and symptoms begin-DO NOT go       Back to bed or wait to see if you get better-TIME IS BRAIN. Warning Signs of HEART ATTACK     Call 911 if you have these symptoms:   Chest discomfort. Most heart attacks involve discomfort in the center of the chest that lasts more than a few minutes, or that goes away and comes back. It can feel like uncomfortable pressure, squeezing, fullness, or pain.  Discomfort in other areas of the upper body. Symptoms can include pain or discomfort in one or both arms, the back, neck, jaw, or stomach.  Shortness of breath with or without chest discomfort.  Other signs may include breaking out in a cold sweat, nausea, or lightheadedness. Don't wait more than five minutes to call 911 - MINUTES MATTER! Fast action can save your life. Calling 911 is almost always the fastest way to get lifesaving treatment. Emergency Medical Services staff can begin treatment when they arrive -- up to an hour sooner than if someone gets to the hospital by car. Narcotic-Analgesic/Acetaminophen (Percocet, Norco, Lorcet HD, Lortab 10/325) - (By mouth)   Why this medicine is used:   Relieves pain.   Contact a nurse or doctor right away if you have:  · Extreme weakness, shallow breathing, slow heartbeat  · Severe confusion, lightheadedness, dizziness, fainting  · Yellow skin or eyes, dark urine or pale stools  · Severe constipation, severe stomach pain, nausea, vomiting, loss of appetite  · Sweating or cold, clammy skin     Common side effects:  · Mild constipation, nausea, vomiting  · Sleepiness, tiredness  · Itching, rash  © 2017 [address] [employer] Information is for End User's use only and may not be sold, redistributed or otherwise used for commercial purposes. The discharge information has been reviewed with the patient and spouse. The patient and spouse verbalized understanding. Discharge medications reviewed with the patient and spouse and appropriate educational materials and side effects teaching were provided.   ___________________________________________________________________________________________________________________________________

## 2018-07-27 NOTE — PERIOP NOTES
Handoff Report from Operating Room to PACU Report received from SHAI Rosario RN and Garima Anderson CRNA regarding Cheryl Hoskins. Surgeon(s): 
Enriqueta Waller MD  And Procedure(s) (LRB): 
REPAIR TENDON TEAR RIGHT FOOT, THERAPUTIC INJECTION RIGHT FOOT  (Right)  confirmed  
with allergies, dressings and local anesthetic discussed. Anesthesia type, drugs, patient history, complications, estimated blood loss, vital signs, intake and output, and last pain medication, lines and temperature were reviewed.

## 2018-07-27 NOTE — ANESTHESIA POSTPROCEDURE EVALUATION
Post-Anesthesia Evaluation and Assessment Patient: Ruth Richardson MRN: [id number]  SSN: [id number] YOB: 1965  Age: 45 y.o. Sex: female Cardiovascular Function/Vital Signs Visit Vitals  /70 (BP 1 Location: Left arm, BP Patient Position: At rest;Head of bed elevated (Comment degrees))  Pulse 82  Temp 37.1 °C (98.7 °F)  Resp 14  
 Ht 5' 4\" (1.626 m)  Wt 94.1 kg (207 lb 7.3 oz)  SpO2 98%  BMI 35.61 kg/m2 Patient is status post general anesthesia for Procedure(s): 
REPAIR TENDON TEAR RIGHT FOOT, THERAPUTIC INJECTION RIGHT FOOT . Nausea/Vomiting: None Postoperative hydration reviewed and adequate. Pain: 
Pain Scale 1: Numeric (0 - 10) (07/27/18 0945) Pain Intensity 1: 0 (07/27/18 0945) Managed Neurological Status:  
Neuro (WDL): Exceptions to WDL (07/27/18 0908) Neuro Neurologic State: Drowsy; Eyes open to voice (07/27/18 0908) Orientation Level: Oriented to person;Oriented to situation (07/27/18 0908) Cognition: Decreased attention/concentration; Follows commands (07/27/18 0908) Speech: Delayed responses; Appropriate for age (07/27/18 0002) LUE Motor Response: Purposeful (07/27/18 0908) LLE Motor Response: Purposeful (07/27/18 0908) RUE Motor Response: Purposeful (07/27/18 0908) RLE Motor Response: Purposeful (07/27/18 0908) At baseline Mental Status and Level of Consciousness: Arousable Pulmonary Status:  
O2 Device: Room air (07/27/18 0945) Adequate oxygenation and airway patent Complications related to anesthesia: None Post-anesthesia assessment completed. No concerns Signed By: Diann Maguire MD   
 July 27, 2018

## 2018-07-27 NOTE — BRIEF OP NOTE
BRIEF OPERATIVE NOTE Date of Procedure: 7/27/2018 Preoperative Diagnosis: TENDON RUPTURE, ACHILLES TENDONITIS Postoperative Diagnosis: TENDON RUPTURE, ACHILLES TENDONITIS Procedure(s): 
REPAIR TENDON TEAR RIGHT FOOT, THERAPUTIC INJECTION RIGHT FOOT Surgeon(s) and Role: 
Ugo Farley MD - Primary Surgical Assistant: 
 
Surgical Staff: 
Circ-1: Glenda Silva Circ-Intern: Lesa Donahue RN Scrub Tech-1: Kay Baez Surg Asst-1: Daria Burkitt Event Time In Incision Start 5554 Incision Close 6339 Anesthesia: General  
Estimated Blood Loss: Less than 5 mL Specimens: None Findings: Hypertrophied peroneal tendon right foot Complications: None Implants:

## 2018-07-27 NOTE — IP AVS SNAPSHOT
[address] [facility] 
[phone] Patient: Geraldine Alexander MRN: [id number] About your hospitalization You were admitted on:  July 27, 2018 You last received care in the:  [facility] PACU You were discharged on:  July 27, 2018 Why you were hospitalized Your primary diagnosis was:  Not on File Follow-up Information Follow up With Details Comments Contact Info Trevor Gonzalez MD   [facility] [address] [facility] 
[phone] Discharge Orders None A check desire indicates which time of day the medication should be taken. My Medications CONTINUE taking these medications Instructions Each Dose to Equal  
 Morning Noon Evening Bedtime  
 atorvastatin 10 mg tablet Commonly known as:  LIPITOR Your last dose was: Your next dose is: TAKE 1 TAB BY MOUTH DAILY. EPINEPHrine 0.3 mg/0.3 mL injection Commonly known as:  Maurisio Sidle Your last dose was: Your next dose is: 0.3 mL by IntraMUSCular route once as needed for 1 dose. 0.3 mg  
    
   
   
   
  
 escitalopram oxalate 20 mg tablet Commonly known as:  Kristasukhdev Crenshaw Your last dose was: Your next dose is: Take 1 Tab by mouth daily. 20 mg FLEXERIL 10 mg tablet Generic drug:  cyclobenzaprine Your last dose was: Your next dose is: Take 10 mg by mouth two (2) times a day. 10 mg  
    
   
   
   
  
 gabapentin 100 mg tablet Commonly known as:  NEURONTIN Your last dose was: Your next dose is: Take 100 mg by mouth two (2) times a day. 100 mg  
    
   
   
   
  
 metFORMIN 500 mg tablet Commonly known as:  GLUCOPHAGE Your last dose was: Your next dose is: Take 1 Tab by mouth two (2) times daily (with meals). 500 mg  
    
   
   
   
  
 multivitamin tablet Commonly known as:  ONE A DAY Your last dose was: Your next dose is: Take 1 Tab by mouth daily. 1 Tab  
    
   
   
   
  
 naltrexone 50 mg tablet Commonly known as:  DEPADE Your last dose was: Your next dose is: Take 50 mg by mouth daily. 50 mg  
    
   
   
   
  
 omeprazole 20 mg capsule Commonly known as:  PRILOSEC Your last dose was: Your next dose is: TAKE 1 CAPSULE DAILY  
     
   
   
   
  
 triamterene-hydroCHLOROthiazide 37.5-25 mg per tablet Commonly known as:  Lian Gaffney Your last dose was: Your next dose is: TAKE 1 TABLET DAILY  
     
   
   
   
  
 VITAMIN B-12 1,000 mcg tablet Generic drug:  cyanocobalamin Your last dose was: Your next dose is: Take 1,000 mcg by mouth daily. 1000 mcg  
    
   
   
   
  
 zolpidem 10 mg tablet Commonly known as:  AMBIEN Your last dose was: Your next dose is: TAKE 1 TABLET BY MOUTH AT BEDTIME AS NEEDED FOR SLEEP  
     
   
   
   
  
  
ASK your doctor about these medications Instructions Each Dose to Equal  
 Morning Noon Evening Bedtime  
 potassium chloride 20 mEq tablet Commonly known as:  K-DUR, KLOR-CON Ask about: Should I take this medication? Your last dose was: Your next dose is: Take 1 Tab by mouth two (2) times a day for 3 days. 20 mEq Discharge Instructions   
  
-Elevate, ice to behind the knee right 
-Do not get cast wet 
-Take all medications as prescribed 
-No weight to right foot DISCHARGE SUMMARY from Nurse PATIENT INSTRUCTIONS: 
 
After general anesthesia or intravenous sedation, for 24 hours or while taking prescription Narcotics: · Limit your activities · Do not drive and operate hazardous machinery · Do not make important personal or business decisions · Do  not drink alcoholic beverages · If you have not urinated within 8 hours after discharge, please contact your surgeon on call. Report the following to your surgeon: 
· Excessive pain, swelling, redness or odor of or around the surgical area · Temperature over 100.5 · Nausea and vomiting lasting longer than 4 hours or if unable to take medications · Any signs of decreased circulation or nerve impairment to extremity: change in color, persistent  numbness, tingling, coldness or increase pain · Any questions What to do at Home: A common side effect of anesthesia following surgery is nausea and/or vomiting. In order to decrease symptoms, it is wise to avoid foods that are high in fat, greasy foods, milk products, and spicy foods for the first 24 hours. Acceptable foods for the first 24 hours following surgery include but are not limited to: 
 
? soup 
? broth 
?  toast  
? crackers ? applesauce 
? bananas  
? mashed potatoes, 
? soft or scrambled eggs 
? oatmeal 
?  jello It is important to eat when taking your pain medication. This will help to prevent nausea. If possible, please try to time your meals with your medications. It is very important to stay hydrated following surgery. Sip fluids frequently while awake. Avoid acidic drinks such as citrus juices and soda for 24 hours. Carbonated beverages may cause bloating and gas. Acceptable fluids include: 
 
? water (flavor packets may add variety) ? coffee or tea (in moderation) ? Gatorade ? Zulma Eloise ? apple juice 
? cranberry juice You are encouraged to cough and deep breathe every hour when awake. This will help to prevent respiratory complications following anesthesia.  You may want to hug a pillow when coughing and sneezing to add additional support to the surgical area and to decrease discomfort if you had abdominal or chest surgery. If you are discharged home with support stockings, you may remove them after 24 hours. Support stockings are used to help prevent blood clots in the legs following surgery. Please take time to review all of your Home Care Instructions and Medication Information sheets provided in your discharge packet. If you have any questions, please contact your surgeons office. Thank you. Learning About How to Use Crutches Your Care Instructions Crutches can help you walk when you have an injured hip, leg, knee, ankle, or foot. Your doctor will tell you how much weight-if any-you can put on your leg. Be sure your crutches fit you. When you stand up in your normal posture, there should be space for two or three fingers between the top of the crutch and your armpit. When you let your hands hang down, the hand  should be at your wrists. When you put your hands on the hand , your elbows should be slightly bent. To stay safe when using crutches: 
· Look straight ahead, not down at your feet. · Clear away small rugs, cords, or anything else that could cause you to trip, slip, or fall. · Be very careful around pets and small children. They can get in your path when you least expect it. · Be sure the rubber tips on your crutches are clean and in good condition to help prevent slipping. · Avoid slick conditions, such as wet floors and snowy or icy driveways. In bad weather, be especially careful on curbs and steps. How to use crutches Getting ready to walk 6. Bend your elbows slightly. Press the padded top parts of the crutches against your sides, under your armpits. 7. If you have been told not to put any weight on your injured leg, keep that leg bent and off the ground. Walking with crutches 1. Put both crutches about 12 inches in front of you. 2. Put your weight on the handgrips, not on the pads under your arms. (Constant pressure against your underarms can cause numbness.) Swing your body forward. (If you have been told not to put any weight on your injured leg, keep that leg bent and off the ground.) 3. To complete the step, put your weight on the strong leg. 4. Move your crutches about 12 inches in front of you, and start the next step. 5. When you're confident using the crutches, you can move the crutches and your injured leg at the same time. Then push straight down on the crutches as you step past the crutches with your strong leg, as you would in normal walking. 6. Take small steps. 7. Use ramps and elevators when you can. Sitting down 1. To sit, back up to the chair. Use one hand to hold both crutches by the handgrips, beside your injured leg. With the other hand, hold onto the seat and slowly lower yourself onto the chair. 2. Lay the crutches on the ground near your chair. If you prop them up, they may fall over. Getting up from a chair 1. To get up from a chair,  the crutches and put them in one hand beside your injured leg. 2. Put your weight on the handgrips of the crutches and on your strong leg to stand up. Walking up stairs 1. To go up stairs, step up with your strong leg and then bring the crutches and your injured leg to the upper step. 2. For stairs that have handrails: Put both crutches under the arm opposite the handrail. Use the hand opposite the handrail to hold both crutches by the handgrips. 3. Hold onto the handrail as you go up. Put your strong leg on the step first when you go up. Walking down stairs 1. To go down stairs, put your crutches and injured leg on the lower step. 2. Bring your strong leg to the lower step. This saying may help you remember: \"Up with the good, down with the bad. \" 3. For stairs that have handrails: Put both crutches under the arm opposite the handrail.  Use the hand opposite the handrail to hold both crutches by the handgrips. Hold onto the handrail as you go down. Follow the same process you use for stairs: Lead with your crutches and injured leg on the way down. Follow-up care is a key part of your treatment and safety. Be sure to make and go to all appointments, and call your doctor if you are having problems. It's also a good idea to know your test results and keep a list of the medicines you take. Where can you learn more? Go to http://fior-amy.info/. Enter P090 in the search box to learn more about \"Learning About How to Use Crutches. \" Current as of: November 29, 2017 Content Version: 11.7 © 1998-0583 [employer]. Care instructions adapted under license by [employer] (which disclaims liability or warranty for this information). If you have questions about a medical condition or this instruction, always ask your healthcare professional. [address] any warranty or liability for your use of this information. Please carry medication information at all times in case of emergency situations. These are general instructions for a healthy lifestyle: No smoking/ No tobacco products/ Avoid exposure to second hand smoke Surgeon General's Warning:  Quitting smoking now greatly reduces serious risk to your health. Obesity, smoking, and sedentary lifestyle greatly increases your risk for illness A healthy diet, regular physical exercise & weight monitoring are important for maintaining a healthy lifestyle You may be retaining fluid if you have a history of heart failure or if you experience any of the following symptoms:  Weight gain of 3 pounds or more overnight or 5 pounds in a week, increased swelling in our hands or feet or shortness of breath while lying flat in bed. Please call your doctor as soon as you notice any of these symptoms; do not wait until your next office visit.  
 
Recognize signs and symptoms of STROKE: 
 
 F-face looks uneven A-arms unable to move or move unevenly S-speech slurred or non-existent T-time-call 911 as soon as signs and symptoms begin-DO NOT go Back to bed or wait to see if you get better-TIME IS BRAIN. Warning Signs of HEART ATTACK Call 911 if you have these symptoms: 
? Chest discomfort. Most heart attacks involve discomfort in the center of the chest that lasts more than a few minutes, or that goes away and comes back. It can feel like uncomfortable pressure, squeezing, fullness, or pain. ? Discomfort in other areas of the upper body. Symptoms can include pain or discomfort in one or both arms, the back, neck, jaw, or stomach. ? Shortness of breath with or without chest discomfort. ? Other signs may include breaking out in a cold sweat, nausea, or lightheadedness. Don't wait more than five minutes to call [address]! Fast action can save your life. Calling 911 is almost always the fastest way to get lifesaving treatment. Emergency Medical Services staff can begin treatment when they arrive  up to an hour sooner than if someone gets to the hospital by car. Narcotic-Analgesic/Acetaminophen (Percocet, Norco, Lorcet HD, Lortab 10/325) - (By mouth) Why this medicine is used:  
Relieves pain. Contact a nurse or doctor right away if you have: 
· Extreme weakness, shallow breathing, slow heartbeat · Severe confusion, lightheadedness, dizziness, fainting · Yellow skin or eyes, dark urine or pale stools · Severe constipation, severe stomach pain, nausea, vomiting, loss of appetite · Sweating or cold, clammy skin Common side effects: · Mild constipation, nausea, vomiting · Sleepiness, tiredness · Itching, rash © 2017 [facility] Information is for End User's use only and may not be sold, redistributed or otherwise used for commercial purposes.  
 
The discharge information has been reviewed with the {PATIENT PARENT GUARDIAN:27850}. The {PATIENT PARENT GUARDIAN:83810} verbalized understanding. Discharge medications reviewed with the {Dishcarge meds reviewed DVAQ:44200} and appropriate educational materials and side effects teaching were provided. ___________________________________________________________________________________________________________________________________ Introducing [facility]! Dear Berny Tran: Thank you for requesting a [facility] account. Our records indicate that you already have an active [facility] account. You can access your account anytime at [email or web address] Did you know that you can access your hospital and ER discharge instructions at any time in [facility]? You can also review all of your test results from your hospital stay or ER visit. Additional Information If you have questions, please visit the Frequently Asked Questions section of the [facility] website at [email or web address] Remember, [facility] is NOT to be used for urgent needs. For medical emergencies, dial 911. Now available from your iPhone and Android! Introducing Douglas Vasquez As a [facility] patient, I wanted to make you aware of our electronic visit tool called Douglas Vasquez. [facility] 24/7 allows you to connect within minutes with a medical provider 24 hours a day, seven days a week via a mobile device or tablet or logging into a secure website from your computer. You can access Douglas Vasquez from anywhere in the [place]. A virtual visit might be right for you when you have a simple condition and feel like you just dont want to get out of bed, or cant get away from work for an appointment, when your regular [facility] provider is not available (evenings, weekends or holidays), or when youre out of town and need minor care.   Electronic visits cost only $49 and if the [facility] 24/7 provider determines a prescription is needed to treat your condition, one can be electronically transmitted to a nearby pharmacy*. Please take a moment to enroll today if you have not already done so. The enrollment process is free and takes just a few minutes. To enroll, please download the [employer] 24/7 david to your tablet or phone, or visit [email or web address] to enroll on your computer. And, as an [address] [place] [address] patient with a [employer] account, the results of your visits will be scanned into your electronic medical record and your primary care provider will be able to view the scanned results. We urge you to continue to see your regular Evaristo Rudolph provider for your ongoing medical care. And while your primary care provider may not be the one available when you seek a [employer] virtual visit, the peace of mind you get from getting a real diagnosis real time can be priceless. For more information on [employer], view our Frequently Asked Questions (FAQs) at [email or web address]. Sincerely, 
 
Martinez Mathew MD 
Chief Medical Officer [facility] Alanna Thompson *:  certain medications cannot be prescribed via [employer] Providers Seen During Your Hospitalization Provider Specialty Primary office phone Carlos Hassan MD Podiatry [phone] Your Primary Care Physician (PCP) Primary Care Physician Office Phone Office Fax [facility] [phone] You are allergic to the following Allergen Reactions Latex Contact Dermatitis Welts, redness, \"oozing\" per pt. Strawberry Anaphylaxis Demerol (Meperidine) Anaphylaxis Hydromorphone Other (comments) Nausea and hypotension Lyrica (Pregabalin) Swelling Feet and ankles and hands swelling Morphine Other (comments) Hypotension Pcn (Penicillins) Hives Recent Documentation Height Weight BMI OB Status Smoking Status 1.626 m 94.1 kg 35.61 kg/m2 Hysterectomy Never Smoker Emergency Contacts Name Discharge Info Relation Home Work Mobile Kasi Madsen DISCHARGE CAREGIVER [3] Spouse [3] [phone] Patient Belongings The following personal items are in your possession at time of discharge: 
  Dental Appliances: None  Visual Aid: None      Home Medications: None   Jewelry: None  Clothing: Undergarments, Footwear, Dress    Other Valuables: Other (comment) (walking boot)  Personal Items Sent to Safe: declined Please provide this summary of care documentation to your next provider. Signatures-by signing, you are acknowledging that this After Visit Summary has been reviewed with you and you have received a copy. Patient Signature:  ____________________________________________________________ Date:  ____________________________________________________________  
  
Samantha Watson Provider Signature:  ____________________________________________________________ Date:  ____________________________________________________________

## 2018-07-27 NOTE — ANESTHESIA PREPROCEDURE EVALUATION
Anesthetic History   No history of anesthetic complications            Review of Systems / Medical History  Patient summary reviewed, nursing notes reviewed and pertinent labs reviewed    Pulmonary  Within defined limits                 Neuro/Psych   Within defined limits           Cardiovascular  Within defined limits                Exercise tolerance: >4 METS     GI/Hepatic/Renal  Within defined limits   GERD      Liver disease     Endo/Other  Within defined limits  Diabetes    Morbid obesity and arthritis     Other Findings              Physical Exam    Airway  Mallampati: II  TM Distance: 4 - 6 cm  Neck ROM: normal range of motion   Mouth opening: Normal     Cardiovascular  Regular rate and rhythm,  S1 and S2 normal,  no murmur, click, rub, or gallop             Dental  No notable dental hx       Pulmonary  Breath sounds clear to auscultation               Abdominal  GI exam deferred       Other Findings            Anesthetic Plan    ASA: 2  Anesthesia type: general      Post-op pain plan if not by surgeon: peripheral nerve block single

## 2018-07-27 NOTE — PERIOP NOTES
Fitted for crutches. Instructions given demonstrated fair ability. Plans to get knee scooter after discharge.

## 2018-08-03 RX ORDER — OMEPRAZOLE 20 MG/1
CAPSULE, DELAYED RELEASE ORAL
Qty: 90 CAP | Refills: 3 | Status: SHIPPED | OUTPATIENT
Start: 2018-08-03 | End: 2019-02-27 | Stop reason: SDUPTHER

## 2018-09-05 RX ORDER — ATORVASTATIN CALCIUM 10 MG/1
TABLET, FILM COATED ORAL
Qty: 90 TAB | Refills: 3 | Status: SHIPPED | OUTPATIENT
Start: 2018-09-05 | End: 2019-04-19

## 2018-09-10 RX ORDER — TRIAMTERENE/HYDROCHLOROTHIAZID 37.5-25 MG
TABLET ORAL
Qty: 90 TAB | Refills: 3 | Status: SHIPPED | OUTPATIENT
Start: 2018-09-10 | End: 2019-07-31 | Stop reason: SDUPTHER

## 2018-10-22 RX ORDER — AZITHROMYCIN 250 MG/1
250 TABLET, FILM COATED ORAL SEE ADMIN INSTRUCTIONS
Qty: 6 TAB | Refills: 0 | Status: SHIPPED | OUTPATIENT
Start: 2018-10-22 | End: 2018-10-27

## 2018-11-20 NOTE — PROGRESS NOTES
HISTORY OF PRESENT ILLNESS  Maribell Wheat is a 52 y.o. female. HPI   F/u anxiety, prediabetes , hepatic steatosis obesity  Father passed this month after hip fx  Not sleeping at all since then on ambien 10 mg hs  Has repair of right peroneal tendon this summer--Dr Yoandy Rose, still some soreness  Did not see endocrine MD for obesity   Last a1c 6.0 ldl 93  Takes HCTZ for hand and feet edema  Takes otc Kcl since July K wsa 3.3  Due in 1-2 months for 5 yr colonsocopy  Last OV  lexapro was increased to 20 mg every day last visit, working well until 3 pm then increased  saxenda was prescribed in addition to diet and exercise for obesity but could not afford the med--nonformulary  Lost 2 lbs since last visit  Difficulty having top make time to exercise with work and taking care of her parents  savella was discontinued     Last OV  Had a fall and tore rigth ankle ligaments and tendons--orthopedic boot--Dr Diop. lexapro wears off by mid afternoon--anxiety . Stressed taking care of father who has dementia  Saw Dr Margo Ramirez recently --labs were done-low Vit D level. Weight up several lbs-not able to exercise d/t ankle injury and work and taking care of father                Patient Active Problem List    Diagnosis Date Noted    Severe obesity (BMI 35.0-39. 9) with comorbidity ([facility] [address] 04/12/2018    FH: colon cancer 11/01/2017    Palpitation 02/17/2017    Hepatic steatosis 06/23/2016    Dislocation of prosthetic joint ([address] 07/25/2014    HLD (hyperlipidemia) 07/11/2014    Prediabetes 09/03/2013    Ischemic colitis ([address] 09/03/2013    DJD (degenerative joint disease) of knee 06/04/2013    Fibromyalgia 09/30/2011    Insomnia 09/30/2011     Current Outpatient Medications   Medication Sig Dispense Refill    triamterene-hydroCHLOROthiazide (MAXZIDE) 37.5-25 mg per tablet TAKE 1 TABLET DAILY 90 Tab 3    atorvastatin (LIPITOR) 10 mg tablet TAKE 1 TABLET DAILY 90 Tab 3    omeprazole (PRILOSEC) 20 mg capsule TAKE 1 CAPSULE DAILY 90 Cap 3    metFORMIN (GLUCOPHAGE) 500 mg tablet Take 1 Tab by mouth two (2) times daily (with meals). 180 Tab 3    escitalopram oxalate (LEXAPRO) 20 mg tablet Take 1 Tab by mouth daily. 90 Tab 3    multivitamin (ONE A DAY) tablet Take 1 Tab by mouth daily.  naltrexone (DEPADE) 50 mg tablet Take 50 mg by mouth daily.  zolpidem (AMBIEN) 10 mg tablet TAKE 1 TABLET BY MOUTH AT BEDTIME AS NEEDED FOR SLEEP 30 tablet 3    cyclobenzaprine (FLEXERIL) 10 mg tablet Take 10 mg by mouth two (2) times a day.  cyanocobalamin (VITAMIN B-12) 1,000 mcg tablet Take 1,000 mcg by mouth daily.  EPINEPHrine (EPIPEN) 0.3 mg/0.3 mL injection 0.3 mL by IntraMUSCular route once as needed for 1 dose. 1 Each 1    gabapentin (NEURONTIN) 100 mg tablet Take 100 mg by mouth two (2) times a day. Allergies   Allergen Reactions    Latex Contact Dermatitis     Welts, redness, \"oozing\" per pt.     Strawberry Anaphylaxis    Demerol [Meperidine] Anaphylaxis    Hydromorphone Other (comments)     Nausea and hypotension    Lyrica [Pregabalin] Swelling     Feet and ankles and hands swelling    Morphine Other (comments)     Hypotension      Pcn [Penicillins] Hives      Lab Results   Component Value Date/Time    WBC 7.4 07/20/2018 02:16 PM    HGB 12.5 07/20/2018 02:16 PM    HCT 38.5 07/20/2018 02:16 PM    Hematocrit (POC) 41 07/27/2018 06:55 AM    PLATELET 703 72/31/0624 02:16 PM    MCV 87.9 07/20/2018 02:16 PM     Lab Results   Component Value Date/Time    Hemoglobin A1c 6.0 (H) 05/23/2018 08:57 AM    Hemoglobin A1c 6.1 (H) 09/05/2017 12:06 PM    Hemoglobin A1c 6.1 (H) 01/19/2017 09:06 AM    Glucose 102 (H) 07/20/2018 02:16 PM    Glucose (POC) 113 (H) 07/27/2018 06:55 AM    Glucose (POC) 86 06/09/2016 12:08 PM    LDL, calculated 93 05/23/2018 08:57 AM    Creatinine (POC) 0.8 07/27/2018 06:55 AM    Creatinine 0.78 07/20/2018 02:16 PM      Lab Results   Component Value Date/Time    Cholesterol, total 177 05/23/2018 08:57 AM    HDL Cholesterol 58 05/23/2018 08:57 AM    LDL, calculated 93 05/23/2018 08:57 AM    Triglyceride 129 05/23/2018 08:57 AM     Lab Results   Component Value Date/Time    GFR est non-AA >60 07/20/2018 02:16 PM    GFRNA, POC >60 07/27/2018 06:55 AM    GFR est AA >60 07/20/2018 02:16 PM    GFRAA, POC >60 07/27/2018 06:55 AM    Creatinine 0.78 07/20/2018 02:16 PM    Creatinine (POC) 0.8 07/27/2018 06:55 AM    BUN 16 07/20/2018 02:16 PM    BUN (POC) 24 (H) 07/27/2018 06:55 AM    Sodium 139 07/20/2018 02:16 PM    Sodium (POC) 141 07/27/2018 06:55 AM    Potassium 3.3 (L) 07/20/2018 02:16 PM    Potassium (POC) 3.4 (L) 07/27/2018 06:55 AM    Chloride 100 07/20/2018 02:16 PM    Chloride (POC) 98 07/27/2018 06:55 AM    CO2 31 07/20/2018 02:16 PM        ROS    Physical Exam   Constitutional: She appears well-developed and well-nourished. Appears stated age, obese, nad   Cardiovascular: Normal rate, regular rhythm and normal heart sounds. Exam reveals no gallop and no friction rub. No murmur heard. Pulmonary/Chest: Effort normal and breath sounds normal. No respiratory distress. She has no wheezes. Abdominal: Soft. Bowel sounds are normal.   Musculoskeletal: She exhibits no edema. Neurological: She is alert. Psychiatric: She has a normal mood and affect. Nursing note and vitals reviewed. ASSESSMENT and PLAN  Diagnoses and all orders for this visit:    1. Prediabetes  -     METABOLIC PANEL, COMPREHENSIVE  -     HEMOGLOBIN A1C WITH EAG    2. Essential hypertension   Controlled on hctz  3. Pure hypercholesterolemia  -     METABOLIC PANEL, COMPREHENSIVE  -     LIPID PANEL   Continue lipitor  4. Morbid obesity ([address]   I have reviewed/discussed the above normal BMI with the patient. I have recommended the following interventions: dietary management education, guidance, and counseling and encourage exercise . Griselda Copper 5. Anxiety   Continue lexapro  6.  Hepatic steatosis  -     METABOLIC PANEL, COMPREHENSIVE    7. Colon cancer screening  -     REFERRAL TO GASTROENTEROLOGY    8. Primary insomnia  -     zolpidem (AMBIEN) 10 mg tablet; TAKE 1 TABLET BY MOUTH AT BEDTIME AS NEEDED FOR SLEEP  9. Bereavement   Has good family support    Follow-up Disposition:  Return in about 6 months (around 5/21/2019) for htn hld prediabetes.

## 2018-11-21 ENCOUNTER — OFFICE VISIT (OUTPATIENT)
Dept: INTERNAL MEDICINE CLINIC | Age: 53
End: 2018-11-21

## 2018-11-21 VITALS
HEIGHT: 64 IN | TEMPERATURE: 98 F | HEART RATE: 96 BPM | SYSTOLIC BLOOD PRESSURE: 117 MMHG | BODY MASS INDEX: 35.07 KG/M2 | DIASTOLIC BLOOD PRESSURE: 79 MMHG | OXYGEN SATURATION: 95 % | WEIGHT: 205.4 LBS | RESPIRATION RATE: 16 BRPM

## 2018-11-21 DIAGNOSIS — K76.0 HEPATIC STEATOSIS: ICD-10-CM

## 2018-11-21 DIAGNOSIS — F41.9 ANXIETY: ICD-10-CM

## 2018-11-21 DIAGNOSIS — Z12.11 COLON CANCER SCREENING: ICD-10-CM

## 2018-11-21 DIAGNOSIS — R73.03 PREDIABETES: Primary | ICD-10-CM

## 2018-11-21 DIAGNOSIS — E66.01 MORBID OBESITY (HCC): ICD-10-CM

## 2018-11-21 DIAGNOSIS — F51.01 PRIMARY INSOMNIA: ICD-10-CM

## 2018-11-21 DIAGNOSIS — E78.00 PURE HYPERCHOLESTEROLEMIA: ICD-10-CM

## 2018-11-21 DIAGNOSIS — I10 ESSENTIAL HYPERTENSION: ICD-10-CM

## 2018-11-21 RX ORDER — ZOLPIDEM TARTRATE 10 MG/1
TABLET ORAL
Qty: 30 TAB | Refills: 5 | Status: SHIPPED | OUTPATIENT
Start: 2018-11-21 | End: 2019-02-27 | Stop reason: SDUPTHER

## 2018-11-22 LAB
ALBUMIN SERPL-MCNC: 4.5 G/DL (ref 3.5–5.5)
ALBUMIN/GLOB SERPL: 1.8 {RATIO} (ref 1.2–2.2)
ALP SERPL-CCNC: 119 IU/L (ref 39–117)
ALT SERPL-CCNC: 51 IU/L (ref 0–32)
AST SERPL-CCNC: 30 IU/L (ref 0–40)
BILIRUB SERPL-MCNC: 0.3 MG/DL (ref 0–1.2)
BUN SERPL-MCNC: 24 MG/DL (ref 6–24)
BUN/CREAT SERPL: 36 (ref 9–23)
CALCIUM SERPL-MCNC: 9.3 MG/DL (ref 8.7–10.2)
CHLORIDE SERPL-SCNC: 99 MMOL/L (ref 96–106)
CHOLEST SERPL-MCNC: 153 MG/DL (ref 100–199)
CO2 SERPL-SCNC: 28 MMOL/L (ref 20–29)
CREAT SERPL-MCNC: 0.67 MG/DL (ref 0.57–1)
EST. AVERAGE GLUCOSE BLD GHB EST-MCNC: 131 MG/DL
GLOBULIN SER CALC-MCNC: 2.5 G/DL (ref 1.5–4.5)
GLUCOSE SERPL-MCNC: 103 MG/DL (ref 65–99)
HBA1C MFR BLD: 6.2 % (ref 4.8–5.6)
HDLC SERPL-MCNC: 51 MG/DL
LDLC SERPL CALC-MCNC: 77 MG/DL (ref 0–99)
POTASSIUM SERPL-SCNC: 4 MMOL/L (ref 3.5–5.2)
PROT SERPL-MCNC: 7 G/DL (ref 6–8.5)
SODIUM SERPL-SCNC: 142 MMOL/L (ref 134–144)
TRIGL SERPL-MCNC: 123 MG/DL (ref 0–149)
VLDLC SERPL CALC-MCNC: 25 MG/DL (ref 5–40)

## 2018-12-20 RX ORDER — BUSPIRONE HYDROCHLORIDE 5 MG/1
5 TABLET ORAL 2 TIMES DAILY
Qty: 60 TAB | Refills: 3 | Status: SHIPPED | OUTPATIENT
Start: 2018-12-20 | End: 2019-01-08 | Stop reason: SDUPTHER

## 2019-01-08 RX ORDER — BUSPIRONE HYDROCHLORIDE 5 MG/1
5 TABLET ORAL 2 TIMES DAILY
Qty: 180 TAB | Refills: 3 | Status: SHIPPED | OUTPATIENT
Start: 2019-01-08 | End: 2020-02-18 | Stop reason: SDUPTHER

## 2019-02-27 DIAGNOSIS — F51.01 PRIMARY INSOMNIA: ICD-10-CM

## 2019-02-27 NOTE — TELEPHONE ENCOUNTER
Pt has to use new pharm for insurance reasons so she needs new scripts sent to them for these medications. Thanks.

## 2019-02-27 NOTE — TELEPHONE ENCOUNTER
PCP: Emma Welch MD    Last appt: 11/21/2018  Future Appointments   Date Time Provider Law Melara   5/21/2019  8:30 AM Emma Welch MD [facility]       Requested Prescriptions     Pending Prescriptions Disp Refills    zolpidem (AMBIEN) 10 mg tablet 30 Tab 5     Sig: TAKE 1 TABLET BY MOUTH AT BEDTIME AS NEEDED FOR SLEEP    escitalopram oxalate (LEXAPRO) 20 mg tablet 90 Tab 3     Sig: Take 1 Tab by mouth daily.     omeprazole (PRILOSEC) 20 mg capsule 90 Cap 3     Sig: TAKE 1 CAPSULE DAILY

## 2019-02-28 RX ORDER — ESCITALOPRAM OXALATE 20 MG/1
20 TABLET ORAL DAILY
Qty: 90 TAB | Refills: 3 | Status: SHIPPED | OUTPATIENT
Start: 2019-02-28 | End: 2019-05-22 | Stop reason: SDUPTHER

## 2019-02-28 RX ORDER — ZOLPIDEM TARTRATE 10 MG/1
TABLET ORAL
Qty: 30 TAB | Refills: 5 | Status: SHIPPED | OUTPATIENT
Start: 2019-02-28 | End: 2019-07-31 | Stop reason: SDUPTHER

## 2019-02-28 RX ORDER — OMEPRAZOLE 20 MG/1
CAPSULE, DELAYED RELEASE ORAL
Qty: 90 CAP | Refills: 3 | Status: SHIPPED | OUTPATIENT
Start: 2019-02-28 | End: 2019-05-18 | Stop reason: SDUPTHER

## 2019-03-25 RX ORDER — ESCITALOPRAM OXALATE 20 MG/1
TABLET ORAL
Qty: 90 TAB | Refills: 3 | Status: SHIPPED | OUTPATIENT
Start: 2019-03-25 | End: 2019-07-31 | Stop reason: SDUPTHER

## 2019-04-19 RX ORDER — ATORVASTATIN CALCIUM 10 MG/1
10 TABLET, FILM COATED ORAL DAILY
Qty: 90 TAB | Refills: 3 | Status: CANCELLED | OUTPATIENT
Start: 2019-04-19

## 2019-04-19 RX ORDER — ATORVASTATIN CALCIUM 10 MG/1
10 TABLET, FILM COATED ORAL DAILY
Qty: 90 TAB | Refills: 3 | Status: SHIPPED | OUTPATIENT
Start: 2019-04-19 | End: 2019-07-31 | Stop reason: SDUPTHER

## 2019-04-19 NOTE — TELEPHONE ENCOUNTER
Requested Prescriptions     Pending Prescriptions Disp Refills    atorvastatin (LIPITOR) 10 mg tablet 90 Tab 3     Sig: Take 1 Tab by mouth daily. Indications: combined high blood cholesterol and triglyceride level     PCP: Sally Avalos MD    Last appt: 11/21/2018  Future Appointments   Date Time Provider Law Saritha   5/21/2019  8:30 AM Sally Avalos MD [facility] 87       Requested Prescriptions     Pending Prescriptions Disp Refills    atorvastatin (LIPITOR) 10 mg tablet 90 Tab 3     Sig: Take 1 Tab by mouth daily.  Indications: combined high blood cholesterol and triglyceride level

## 2019-05-13 DIAGNOSIS — E11.9 CONTROLLED TYPE 2 DIABETES MELLITUS WITHOUT COMPLICATION, WITH LONG-TERM CURRENT USE OF INSULIN (HCC): ICD-10-CM

## 2019-05-13 DIAGNOSIS — Z79.4 CONTROLLED TYPE 2 DIABETES MELLITUS WITHOUT COMPLICATION, WITH LONG-TERM CURRENT USE OF INSULIN (HCC): ICD-10-CM

## 2019-05-13 RX ORDER — METFORMIN HYDROCHLORIDE 500 MG/1
TABLET ORAL
Qty: 180 TAB | Refills: 3 | Status: SHIPPED | OUTPATIENT
Start: 2019-05-13 | End: 2019-07-31 | Stop reason: SDUPTHER

## 2019-05-20 RX ORDER — OMEPRAZOLE 20 MG/1
CAPSULE, DELAYED RELEASE ORAL
Qty: 90 CAP | Refills: 3 | Status: SHIPPED | OUTPATIENT
Start: 2019-05-20 | End: 2019-07-31 | Stop reason: SDUPTHER

## 2019-05-20 NOTE — TELEPHONE ENCOUNTER
PCP: Lawyer Niecy MD    Last appt: 11/21/2018  Future Appointments   Date Time Provider Law Melara   5/21/2019  8:30 AM Lawyer Niecy MD [facility] 87       Requested Prescriptions     Pending Prescriptions Disp Refills    omeprazole (PRILOSEC) 20 mg capsule 90 Cap 3     Sig: TAKE 1 CAPSULE DAILY

## 2019-07-08 RX ORDER — METFORMIN HYDROCHLORIDE 500 MG/1
TABLET ORAL
Qty: 180 TAB | Refills: 1 | Status: SHIPPED | OUTPATIENT
Start: 2019-07-08 | End: 2019-07-31 | Stop reason: SDUPTHER

## 2019-07-30 PROBLEM — E78.00 PURE HYPERCHOLESTEROLEMIA: Status: ACTIVE | Noted: 2019-07-30

## 2019-07-30 NOTE — PROGRESS NOTES
HISTORY OF PRESENT ILLNESS  Billy Adame is a 52 y.o. female. HPI   F/u anxiety, prediabetes ,HLD  hepatic steatosis obesity  Last LDL 77 a1c 6.2  On metformin for prediabetes  Exercises 3d per week at the gym and on low carb diet but unable to lose weight  Feels tired a lot  Hx fibromyalgia  Anxiety has been controlled on lexapro per pt    Last OV  Father passed this month after hip fx  Not sleeping at all since then on ambien 10 mg hs  Has repair of right peroneal tendon this summer--Dr Eri Danielson, still some soreness  Did not see endocrine MD for obesity   Last a1c 6.0 ldl 93  Takes HCTZ for hand and feet edema  Takes otc Kcl since July K wsa 3.3  Due in 1-2 months for 5 yr colonsocopy      Patient Active Problem List    Diagnosis Date Noted    Severe obesity (BMI 35.0-39. 9) with comorbidity ([facility] [address] 04/12/2018    FH: colon cancer 11/01/2017    Palpitation 02/17/2017    Hepatic steatosis 06/23/2016    Dislocation of prosthetic joint ([address] 07/25/2014    HLD (hyperlipidemia) 07/11/2014    Prediabetes 09/03/2013    Ischemic colitis ([address] 09/03/2013    DJD (degenerative joint disease) of knee 06/04/2013    Fibromyalgia 09/30/2011    Insomnia 09/30/2011     Current Outpatient Medications   Medication Sig Dispense Refill    metFORMIN (GLUCOPHAGE) 500 mg tablet TAKE 1 TABLET BY MOUTH TWICE A DAY WITH MEALS. 180 Tab 1    escitalopram oxalate (LEXAPRO) 20 mg tablet TAKE 1 TABLET BY MOUTH EVERY DAY 90 Tab 3    omeprazole (PRILOSEC) 20 mg capsule TAKE 1 CAPSULE DAILY 90 Cap 3    metFORMIN (GLUCOPHAGE) 500 mg tablet TAKE 1 TABLET TWICE A DAY WITH MEALS 180 Tab 3    atorvastatin (LIPITOR) 10 mg tablet Take 1 Tab by mouth daily.  Indications: combined high blood cholesterol and triglyceride level 90 Tab 3    escitalopram oxalate (LEXAPRO) 20 mg tablet TAKE 1 TABLET DAILY 90 Tab 3    zolpidem (AMBIEN) 10 mg tablet TAKE 1 TABLET BY MOUTH AT BEDTIME AS NEEDED FOR SLEEP 30 Tab 5    busPIRone (BUSPAR) 5 mg tablet Take 1 Tab by mouth two (2) times a day. 180 Tab 3    triamterene-hydroCHLOROthiazide (MAXZIDE) 37.5-25 mg per tablet TAKE 1 TABLET DAILY 90 Tab 3    multivitamin (ONE A DAY) tablet Take 1 Tab by mouth daily.  naltrexone (DEPADE) 50 mg tablet Take 50 mg by mouth daily.  cyclobenzaprine (FLEXERIL) 10 mg tablet Take 10 mg by mouth two (2) times a day.  cyanocobalamin (VITAMIN B-12) 1,000 mcg tablet Take 1,000 mcg by mouth daily.  EPINEPHrine (EPIPEN) 0.3 mg/0.3 mL injection 0.3 mL by IntraMUSCular route once as needed for 1 dose. 1 Each 1    gabapentin (NEURONTIN) 100 mg tablet Take 100 mg by mouth two (2) times a day. Allergies   Allergen Reactions    Latex Contact Dermatitis     Welts, redness, \"oozing\" per pt.     Strawberry Anaphylaxis    Demerol [Meperidine] Anaphylaxis    Hydromorphone Other (comments)     Nausea and hypotension    Lyrica [Pregabalin] Swelling     Feet and ankles and hands swelling    Morphine Other (comments)     Hypotension      Pcn [Penicillins] Hives     Social History     Tobacco Use    Smoking status: Never Smoker    Smokeless tobacco: Never Used   Substance Use Topics    Alcohol use: No      Lab Results   Component Value Date/Time    WBC 7.4 07/20/2018 02:16 PM    HGB 12.5 07/20/2018 02:16 PM    HCT 38.5 07/20/2018 02:16 PM    Hematocrit (POC) 41 07/27/2018 06:55 AM    PLATELET 177 66/07/3100 02:16 PM    MCV 87.9 07/20/2018 02:16 PM     Lab Results   Component Value Date/Time    Hemoglobin A1c 6.2 (H) 11/21/2018 08:53 AM    Hemoglobin A1c 6.0 (H) 05/23/2018 08:57 AM    Hemoglobin A1c 6.1 (H) 09/05/2017 12:06 PM    Glucose 103 (H) 11/21/2018 08:53 AM    Glucose (POC) 113 (H) 07/27/2018 06:55 AM    Glucose (POC) 86 06/09/2016 12:08 PM    LDL, calculated 77 11/21/2018 08:53 AM    Creatinine (POC) 0.8 07/27/2018 06:55 AM    Creatinine 0.67 11/21/2018 08:53 AM      Lab Results   Component Value Date/Time    Cholesterol, total 153 11/21/2018 08:53 AM HDL Cholesterol 51 11/21/2018 08:53 AM    LDL, calculated 77 11/21/2018 08:53 AM    Triglyceride 123 11/21/2018 08:53 AM     Lab Results   Component Value Date/Time    GFR est non-AA 99 11/21/2018 08:53 AM    GFRNA, POC >60 07/27/2018 06:55 AM    GFR est  11/21/2018 08:53 AM    GFRAA, POC >60 07/27/2018 06:55 AM    Creatinine 0.67 11/21/2018 08:53 AM    Creatinine (POC) 0.8 07/27/2018 06:55 AM    BUN 24 11/21/2018 08:53 AM    BUN (POC) 24 (H) 07/27/2018 06:55 AM    Sodium 142 11/21/2018 08:53 AM    Sodium (POC) 141 07/27/2018 06:55 AM    Potassium 4.0 11/21/2018 08:53 AM    Potassium (POC) 3.4 (L) 07/27/2018 06:55 AM    Chloride 99 11/21/2018 08:53 AM    Chloride (POC) 98 07/27/2018 06:55 AM    CO2 28 11/21/2018 08:53 AM        ROS    Physical Exam   Constitutional: She appears well-developed and well-nourished. Appears stated age, obese, nad   Cardiovascular: Normal rate, regular rhythm and normal heart sounds. Exam reveals no gallop and no friction rub. No murmur heard. Pulmonary/Chest: Effort normal and breath sounds normal. No respiratory distress. She has no wheezes. Abdominal: Soft. Bowel sounds are normal.   Musculoskeletal: She exhibits no edema. Neurological: She is alert. Psychiatric: She has a normal mood and affect. Nursing note and vitals reviewed. ASSESSMENT and PLAN  Diagnoses and all orders for this visit:    1. Pure hypercholesterolemia  -     CBC W/O DIFF  -     LIPID PANEL  -     METABOLIC PANEL, COMPREHENSIVE  -     TSH 3RD GENERATION   Continue statin  2. Anxiety   Controlled on ssri  3. Hepatic steatosis  -     METABOLIC PANEL, COMPREHENSIVE   Weight reduction needed-discussed  4. Prediabetes  -     HEMOGLOBIN A1C WITH EAG  -     REFERRAL TO ENDOCRINOLOGY   On metformin, low carb diet  5. Primary insomnia  -     zolpidem (AMBIEN) 10 mg tablet; TAKE 1 TABLET BY MOUTH AT BEDTIME AS NEEDED FOR SLEEP        6.  Obesity (BMI 30-39.9)  -     REFERRAL TO ENDOCRINOLOGY   Pt asking about phentermine  Other orders  -     triamterene-hydroCHLOROthiazide (MAXZIDE) 37.5-25 mg per tablet; Take 1 Tab by mouth daily. -     omeprazole (PRILOSEC) 20 mg capsule; TAKE 1 CAPSULE DAILY  -     escitalopram oxalate (LEXAPRO) 20 mg tablet; Take 1 Tab by mouth daily. -     atorvastatin (LIPITOR) 10 mg tablet; Take 1 Tab by mouth daily. Indications: combined high blood cholesterol and triglyceride level      Follow-up and Dispositions    · Return in about 6 months (around 1/31/2020) for prediabetes, hld anxiety.

## 2019-07-31 ENCOUNTER — OFFICE VISIT (OUTPATIENT)
Dept: INTERNAL MEDICINE CLINIC | Age: 54
End: 2019-07-31

## 2019-07-31 VITALS
RESPIRATION RATE: 16 BRPM | SYSTOLIC BLOOD PRESSURE: 128 MMHG | BODY MASS INDEX: 35.17 KG/M2 | HEIGHT: 64 IN | HEART RATE: 92 BPM | OXYGEN SATURATION: 98 % | TEMPERATURE: 98.8 F | DIASTOLIC BLOOD PRESSURE: 80 MMHG | WEIGHT: 206 LBS

## 2019-07-31 DIAGNOSIS — K76.0 HEPATIC STEATOSIS: ICD-10-CM

## 2019-07-31 DIAGNOSIS — E78.00 PURE HYPERCHOLESTEROLEMIA: Primary | ICD-10-CM

## 2019-07-31 DIAGNOSIS — F41.9 ANXIETY: ICD-10-CM

## 2019-07-31 DIAGNOSIS — Z79.4 CONTROLLED TYPE 2 DIABETES MELLITUS WITHOUT COMPLICATION, WITH LONG-TERM CURRENT USE OF INSULIN (HCC): ICD-10-CM

## 2019-07-31 DIAGNOSIS — E66.9 OBESITY (BMI 30-39.9): ICD-10-CM

## 2019-07-31 DIAGNOSIS — F51.01 PRIMARY INSOMNIA: ICD-10-CM

## 2019-07-31 DIAGNOSIS — E11.9 CONTROLLED TYPE 2 DIABETES MELLITUS WITHOUT COMPLICATION, WITH LONG-TERM CURRENT USE OF INSULIN (HCC): ICD-10-CM

## 2019-07-31 DIAGNOSIS — R73.03 PREDIABETES: ICD-10-CM

## 2019-07-31 RX ORDER — OMEPRAZOLE 20 MG/1
CAPSULE, DELAYED RELEASE ORAL
Qty: 90 CAP | Refills: 3 | Status: SHIPPED | OUTPATIENT
Start: 2019-07-31 | End: 2019-09-18 | Stop reason: SDUPTHER

## 2019-07-31 RX ORDER — TRIAMTERENE/HYDROCHLOROTHIAZID 37.5-25 MG
1 TABLET ORAL DAILY
Qty: 90 TAB | Refills: 3 | Status: SHIPPED | OUTPATIENT
Start: 2019-07-31 | End: 2020-08-31 | Stop reason: SDUPTHER

## 2019-07-31 RX ORDER — ESCITALOPRAM OXALATE 20 MG/1
20 TABLET ORAL DAILY
Qty: 90 TAB | Refills: 3 | Status: SHIPPED | OUTPATIENT
Start: 2019-07-31 | End: 2021-02-02 | Stop reason: SDUPTHER

## 2019-07-31 RX ORDER — METFORMIN HYDROCHLORIDE 500 MG/1
500 TABLET ORAL 2 TIMES DAILY WITH MEALS
Qty: 180 TAB | Refills: 3 | Status: SHIPPED | OUTPATIENT
Start: 2019-07-31 | End: 2019-08-01 | Stop reason: SDUPTHER

## 2019-07-31 RX ORDER — ATORVASTATIN CALCIUM 10 MG/1
10 TABLET, FILM COATED ORAL DAILY
Qty: 90 TAB | Refills: 3 | Status: SHIPPED | OUTPATIENT
Start: 2019-07-31 | End: 2020-08-18 | Stop reason: SDUPTHER

## 2019-07-31 RX ORDER — ZOLPIDEM TARTRATE 10 MG/1
TABLET ORAL
Qty: 30 TAB | Refills: 5 | Status: SHIPPED | OUTPATIENT
Start: 2019-07-31 | End: 2020-02-12

## 2019-07-31 NOTE — PROGRESS NOTES
Chief Complaint   Patient presents with    Medication Refill    Cholesterol Problem     fasting    Labs     6 month follow up

## 2019-07-31 NOTE — PATIENT INSTRUCTIONS
Office Policies    Phone calls/patient messages:            Please allow up to 24 hours for someone in the office to contact you about your call or message. Be mindful your provider may be out of the office or your message may require further review. We encourage you to use [employer] for your messages as this is a faster, more efficient way to communicate with our office                         Medication Refills:            Prescription medications require 48-72 business hours to process. We encourage you to use [facility] for your refills. For controlled medications: Please allow 72 business hours to process. Certain medications may require you to  a written prescription at our office. NO narcotic/controlled medications will be prescribed after 4pm Monday through Friday or on weekends              Form/Paperwork Completion:            Please note a $25 fee may incur for all paperwork for completed by our providers. We ask that you allow 7-10 business days. Pre-payment is due prior to picking up/faxing the completed form. You may also download your forms to [employer] to have your doctor print off.

## 2019-08-01 DIAGNOSIS — E11.9 CONTROLLED TYPE 2 DIABETES MELLITUS WITHOUT COMPLICATION, WITH LONG-TERM CURRENT USE OF INSULIN (HCC): ICD-10-CM

## 2019-08-01 DIAGNOSIS — Z79.4 CONTROLLED TYPE 2 DIABETES MELLITUS WITHOUT COMPLICATION, WITH LONG-TERM CURRENT USE OF INSULIN (HCC): ICD-10-CM

## 2019-08-01 LAB
ALBUMIN SERPL-MCNC: 4.6 G/DL (ref 3.5–5.5)
ALBUMIN/GLOB SERPL: 1.8 {RATIO} (ref 1.2–2.2)
ALP SERPL-CCNC: 112 IU/L (ref 39–117)
ALT SERPL-CCNC: 40 IU/L (ref 0–32)
AST SERPL-CCNC: 30 IU/L (ref 0–40)
BILIRUB SERPL-MCNC: <0.2 MG/DL (ref 0–1.2)
BUN SERPL-MCNC: 17 MG/DL (ref 6–24)
BUN/CREAT SERPL: 25 (ref 9–23)
CALCIUM SERPL-MCNC: 9.4 MG/DL (ref 8.7–10.2)
CHLORIDE SERPL-SCNC: 97 MMOL/L (ref 96–106)
CHOLEST SERPL-MCNC: 179 MG/DL (ref 100–199)
CO2 SERPL-SCNC: 26 MMOL/L (ref 20–29)
CREAT SERPL-MCNC: 0.69 MG/DL (ref 0.57–1)
ERYTHROCYTE [DISTWIDTH] IN BLOOD BY AUTOMATED COUNT: 13.6 % (ref 12.3–15.4)
EST. AVERAGE GLUCOSE BLD GHB EST-MCNC: 140 MG/DL
GLOBULIN SER CALC-MCNC: 2.5 G/DL (ref 1.5–4.5)
GLUCOSE SERPL-MCNC: 104 MG/DL (ref 65–99)
HBA1C MFR BLD: 6.5 % (ref 4.8–5.6)
HCT VFR BLD AUTO: 41.7 % (ref 34–46.6)
HDLC SERPL-MCNC: 52 MG/DL
HGB BLD-MCNC: 13.3 G/DL (ref 11.1–15.9)
LDLC SERPL CALC-MCNC: 102 MG/DL (ref 0–99)
MCH RBC QN AUTO: 28.5 PG (ref 26.6–33)
MCHC RBC AUTO-ENTMCNC: 31.9 G/DL (ref 31.5–35.7)
MCV RBC AUTO: 89 FL (ref 79–97)
PLATELET # BLD AUTO: 285 X10E3/UL (ref 150–450)
POTASSIUM SERPL-SCNC: 4.6 MMOL/L (ref 3.5–5.2)
PROT SERPL-MCNC: 7.1 G/DL (ref 6–8.5)
RBC # BLD AUTO: 4.67 X10E6/UL (ref 3.77–5.28)
SODIUM SERPL-SCNC: 140 MMOL/L (ref 134–144)
TRIGL SERPL-MCNC: 127 MG/DL (ref 0–149)
TSH SERPL DL<=0.005 MIU/L-ACNC: 1.59 UIU/ML (ref 0.45–4.5)
VLDLC SERPL CALC-MCNC: 25 MG/DL (ref 5–40)
WBC # BLD AUTO: 5.8 X10E3/UL (ref 3.4–10.8)

## 2019-08-01 RX ORDER — METFORMIN HYDROCHLORIDE 500 MG/1
1000 TABLET ORAL 2 TIMES DAILY WITH MEALS
Qty: 360 TAB | Refills: 3 | Status: SHIPPED | OUTPATIENT
Start: 2019-08-01 | End: 2020-08-18 | Stop reason: SDUPTHER

## 2019-08-01 NOTE — PROGRESS NOTES
Pt has prediabetes hx  Tell pt a1cis up to 6.5 diabetic range--increase metformin to 2 tabs bid--will escribe  Normal CBC , lipids at goal except LDL goal is < 102--continue lipitor low fat low chol diet and repeat 6 mos.   luytes kidney ok

## 2019-09-19 RX ORDER — OMEPRAZOLE 20 MG/1
CAPSULE, DELAYED RELEASE ORAL
Qty: 90 CAP | Refills: 3 | Status: SHIPPED | OUTPATIENT
Start: 2019-09-19 | End: 2020-08-18 | Stop reason: SDUPTHER

## 2019-10-25 ENCOUNTER — HOSPITAL ENCOUNTER (OUTPATIENT)
Dept: MAMMOGRAPHY | Age: 54
Discharge: HOME | End: 2019-10-25
Attending: INTERNAL MEDICINE
Payer: COMMERCIAL

## 2019-10-25 DIAGNOSIS — Z12.31 VISIT FOR SCREENING MAMMOGRAM: ICD-10-CM

## 2019-10-25 PROCEDURE — 77063 BREAST TOMOSYNTHESIS BI: CPT

## 2019-12-24 ENCOUNTER — HOSPITAL ENCOUNTER (OUTPATIENT)
Dept: ULTRASOUND IMAGING | Age: 54
Discharge: HOME | End: 2019-12-24
Attending: NURSE PRACTITIONER
Payer: COMMERCIAL

## 2019-12-24 DIAGNOSIS — K21.9 GERD (GASTROESOPHAGEAL REFLUX DISEASE): ICD-10-CM

## 2019-12-24 DIAGNOSIS — R10.13 EPIGASTRIC PAIN: ICD-10-CM

## 2019-12-24 PROCEDURE — 76700 US EXAM ABDOM COMPLETE: CPT

## 2020-01-27 PROBLEM — F41.9 ANXIETY: Status: ACTIVE | Noted: 2020-01-27

## 2020-02-12 DIAGNOSIS — F51.01 PRIMARY INSOMNIA: ICD-10-CM

## 2020-02-12 RX ORDER — ZOLPIDEM TARTRATE 10 MG/1
TABLET ORAL
Qty: 30 TAB | Refills: 1 | Status: SHIPPED | OUTPATIENT
Start: 2020-02-12 | End: 2020-08-18 | Stop reason: SDUPTHER

## 2020-02-18 ENCOUNTER — OFFICE VISIT (OUTPATIENT)
Dept: INTERNAL MEDICINE CLINIC | Age: 55
End: 2020-02-18

## 2020-02-18 VITALS
HEIGHT: 64 IN | OXYGEN SATURATION: 98 % | TEMPERATURE: 98.6 F | BODY MASS INDEX: 35.34 KG/M2 | DIASTOLIC BLOOD PRESSURE: 85 MMHG | WEIGHT: 207 LBS | SYSTOLIC BLOOD PRESSURE: 135 MMHG | HEART RATE: 105 BPM | RESPIRATION RATE: 16 BRPM

## 2020-02-18 DIAGNOSIS — I10 ESSENTIAL HYPERTENSION: ICD-10-CM

## 2020-02-18 DIAGNOSIS — E78.00 PURE HYPERCHOLESTEROLEMIA: ICD-10-CM

## 2020-02-18 DIAGNOSIS — K76.0 HEPATIC STEATOSIS: ICD-10-CM

## 2020-02-18 DIAGNOSIS — Z23 ENCOUNTER FOR IMMUNIZATION: ICD-10-CM

## 2020-02-18 DIAGNOSIS — E66.9 OBESITY (BMI 30-39.9): ICD-10-CM

## 2020-02-18 DIAGNOSIS — E11.9 CONTROLLED TYPE 2 DIABETES MELLITUS WITHOUT COMPLICATION, WITHOUT LONG-TERM CURRENT USE OF INSULIN (HCC): Primary | ICD-10-CM

## 2020-02-18 RX ORDER — BUSPIRONE HYDROCHLORIDE 5 MG/1
5 TABLET ORAL 2 TIMES DAILY
Qty: 180 TAB | Refills: 3 | Status: SHIPPED | OUTPATIENT
Start: 2020-02-18 | End: 2021-02-13

## 2020-02-18 NOTE — PROGRESS NOTES
HISTORY OF PRESENT ILLNESS  Dilshad Benton is a 62 y.o. female. HPI   F/u anxiety, DM-2 ,HLD  hepatic steatosis obesity anxiety   last a1c 6.5     Fasting fsbs < 120  Metformin increased to 1000mg bid for DM-2-dx last OV  Lowered carb intake   Walks for exercise  Feels lexapro helps for anxiety, takes buspar on ly    Last OV  Last LDL 77 a1c 6.2  On metformin for prediabetes  Exercises 3d per week at the gym and on low carb diet but unable to lose weight  Feels tired a lot  Hx fibromyalgia  Anxiety has been controlled on lexapro per pt         Patient Active Problem List    Diagnosis Date Noted    Anxiety 01/27/2020    Pure hypercholesterolemia 07/30/2019    Severe obesity (BMI 35.0-39. 9) with comorbidity ([address] 04/12/2018    FH: colon cancer 11/01/2017    Palpitation 02/17/2017    Hepatic steatosis 06/23/2016    Dislocation of prosthetic joint ([address] 07/25/2014    HLD (hyperlipidemia) 07/11/2014    Prediabetes 09/03/2013    Ischemic colitis ([address] 09/03/2013    DJD (degenerative joint disease) of knee 06/04/2013    Fibromyalgia 09/30/2011    Insomnia 09/30/2011     Current Outpatient Medications   Medication Sig Dispense Refill    zolpidem (AMBIEN) 10 mg tablet TAKE 1 TABLET BY MOUTH AT BEDTIME 30 Tab 1    omeprazole (PRILOSEC) 20 mg capsule TAKE 1 CAPSULE DAILY 90 Cap 3    metFORMIN (GLUCOPHAGE) 500 mg tablet Take 2 Tabs by mouth two (2) times daily (with meals). 360 Tab 3    triamterene-hydroCHLOROthiazide (MAXZIDE) 37.5-25 mg per tablet Take 1 Tab by mouth daily. 90 Tab 3    escitalopram oxalate (LEXAPRO) 20 mg tablet Take 1 Tab by mouth daily. 90 Tab 3    atorvastatin (LIPITOR) 10 mg tablet Take 1 Tab by mouth daily. Indications: combined high blood cholesterol and triglyceride level 90 Tab 3    busPIRone (BUSPAR) 5 mg tablet Take 1 Tab by mouth two (2) times a day. 180 Tab 3    multivitamin (ONE A DAY) tablet Take 1 Tab by mouth daily.       cyclobenzaprine (FLEXERIL) 10 mg tablet Take 10 mg by mouth two (2) times a day.  cyanocobalamin (VITAMIN B-12) 1,000 mcg tablet Take 1,000 mcg by mouth daily.  EPINEPHrine (EPIPEN) 0.3 mg/0.3 mL injection 0.3 mL by IntraMUSCular route once as needed for 1 dose. 1 Each 1    gabapentin (NEURONTIN) 100 mg tablet Take 100 mg by mouth two (2) times a day.  naltrexone (DEPADE) 50 mg tablet Take 50 mg by mouth daily. Allergies   Allergen Reactions    Latex Contact Dermatitis     Welts, redness, \"oozing\" per pt.     Strawberry Anaphylaxis    Demerol [Meperidine] Anaphylaxis    Hydromorphone Other (comments)     Nausea and hypotension    Lyrica [Pregabalin] Swelling     Feet and ankles and hands swelling    Morphine Other (comments)     Hypotension      Pcn [Penicillins] Hives      Lab Results   Component Value Date/Time    WBC 5.8 07/31/2019 08:55 AM    HGB 13.3 07/31/2019 08:55 AM    HCT 41.7 07/31/2019 08:55 AM    Hematocrit (POC) 41 07/27/2018 06:55 AM    PLATELET 820 13/74/9168 08:55 AM    MCV 89 07/31/2019 08:55 AM     Lab Results   Component Value Date/Time    Hemoglobin A1c 6.5 (H) 07/31/2019 08:55 AM    Hemoglobin A1c 6.2 (H) 11/21/2018 08:53 AM    Hemoglobin A1c 6.0 (H) 05/23/2018 08:57 AM    Glucose 104 (H) 07/31/2019 08:55 AM    Glucose (POC) 113 (H) 07/27/2018 06:55 AM    Glucose (POC) 86 06/09/2016 12:08 PM    LDL, calculated 102 (H) 07/31/2019 08:55 AM    Creatinine (POC) 0.8 07/27/2018 06:55 AM    Creatinine 0.69 07/31/2019 08:55 AM      Lab Results   Component Value Date/Time    Cholesterol, total 179 07/31/2019 08:55 AM    HDL Cholesterol 52 07/31/2019 08:55 AM    LDL, calculated 102 (H) 07/31/2019 08:55 AM    Triglyceride 127 07/31/2019 08:55 AM     Lab Results   Component Value Date/Time    GFR est non-AA 98 07/31/2019 08:55 AM    GFRNA, POC >60 07/27/2018 06:55 AM    GFR est  07/31/2019 08:55 AM    GFRAA, POC >60 07/27/2018 06:55 AM    Creatinine 0.69 07/31/2019 08:55 AM    Creatinine (POC) 0.8 07/27/2018 06:55 AM    BUN 17 07/31/2019 08:55 AM    BUN (POC) 24 (H) 07/27/2018 06:55 AM    Sodium 140 07/31/2019 08:55 AM    Sodium (POC) 141 07/27/2018 06:55 AM    Potassium 4.6 07/31/2019 08:55 AM    Potassium (POC) 3.4 (L) 07/27/2018 06:55 AM    Chloride 97 07/31/2019 08:55 AM    Chloride (POC) 98 07/27/2018 06:55 AM    CO2 26 07/31/2019 08:55 AM        ROS    Physical Exam  Vitals signs and nursing note reviewed. Constitutional:       Appearance: She is well-developed. She is obese. Comments: Appears stated age   Cardiovascular:      Rate and Rhythm: Normal rate and regular rhythm. Heart sounds: Normal heart sounds. No murmur. No friction rub. No gallop. Pulmonary:      Effort: Pulmonary effort is normal. No respiratory distress. Breath sounds: Normal breath sounds. No wheezing. Abdominal:      General: Bowel sounds are normal.      Palpations: Abdomen is soft. Neurological:      Mental Status: She is alert. ASSESSMENT and PLAN  Diagnoses and all orders for this visit:    1. Controlled type 2 diabetes mellitus without complication, without long-term current use of insulin (HCC)  -     MICROALBUMIN, UR, RAND W/ MICROALB/CREAT RATIO  -     HEMOGLOBIN A1C WITH EAG  -     METABOLIC PANEL, COMPREHENSIVE   Consider GLP-1 if a1c is up-discussed  2. Essential hypertension  -     METABOLIC PANEL, COMPREHENSIVE   controlled  3. Pure hypercholesterolemia  -     METABOLIC PANEL, COMPREHENSIVE  -     LIPID PANEL   On statin  4. Hepatic steatosis  -     METABOLIC PANEL, COMPREHENSIVE   Weight reduction needed-discused  5. Obesity (BMI 30-39. 9)   I have reviewed/discussed the above normal BMI with the patient. I have recommended the following interventions: dietary management education, guidance, and counseling, encourage exercise and monitor weight . .    6. Encounter for immunization  -     PNEUMOCOCCAL POLYSACCHARIDE VACCINE, 23-VALENT, ADULT OR IMMUNOSUPPRESSED PT DOSE,    7.  Anxiety   Controlled on lexapro   Refilled buspar  Other orders  -     busPIRone (BUSPAR) 5 mg tablet; Take 1 Tab by mouth two (2) times a day. Follow-up and Dispositions    · Return in about 6 months (around 8/18/2020) for dm-2 htn hld anxiety.

## 2020-03-05 LAB
ALBUMIN SERPL-MCNC: 4.6 G/DL (ref 3.8–4.9)
ALBUMIN/CREAT UR: 7 MG/G CREAT (ref 0–29)
ALBUMIN/GLOB SERPL: 1.8 {RATIO} (ref 1.2–2.2)
ALP SERPL-CCNC: 107 IU/L (ref 39–117)
ALT SERPL-CCNC: 42 IU/L (ref 0–32)
AST SERPL-CCNC: 31 IU/L (ref 0–40)
BILIRUB SERPL-MCNC: 0.3 MG/DL (ref 0–1.2)
BUN SERPL-MCNC: 23 MG/DL (ref 6–24)
BUN/CREAT SERPL: 34 (ref 9–23)
CALCIUM SERPL-MCNC: 9.6 MG/DL (ref 8.7–10.2)
CHLORIDE SERPL-SCNC: 98 MMOL/L (ref 96–106)
CHOLEST SERPL-MCNC: 164 MG/DL (ref 100–199)
CO2 SERPL-SCNC: 25 MMOL/L (ref 20–29)
CREAT SERPL-MCNC: 0.68 MG/DL (ref 0.57–1)
CREAT UR-MCNC: 164.2 MG/DL
EST. AVERAGE GLUCOSE BLD GHB EST-MCNC: 131 MG/DL
GLOBULIN SER CALC-MCNC: 2.6 G/DL (ref 1.5–4.5)
GLUCOSE SERPL-MCNC: 105 MG/DL (ref 65–99)
HBA1C MFR BLD: 6.2 % (ref 4.8–5.6)
HDLC SERPL-MCNC: 49 MG/DL
LDLC SERPL CALC-MCNC: 79 MG/DL (ref 0–99)
MICROALBUMIN UR-MCNC: 12.2 UG/ML
POTASSIUM SERPL-SCNC: 4.6 MMOL/L (ref 3.5–5.2)
PROT SERPL-MCNC: 7.2 G/DL (ref 6–8.5)
SODIUM SERPL-SCNC: 142 MMOL/L (ref 134–144)
TRIGL SERPL-MCNC: 179 MG/DL (ref 0–149)
VLDLC SERPL CALC-MCNC: 36 MG/DL (ref 5–40)

## 2020-07-23 ENCOUNTER — TELEPHONE (OUTPATIENT)
Dept: INTERNAL MEDICINE CLINIC | Age: 55
End: 2020-07-23

## 2020-07-23 NOTE — TELEPHONE ENCOUNTER
----- Message from Chelsea Bray sent at 7/23/2020 11:51 AM EDT -----  Regarding: Legrand Najjar MD  Appointment not available    Caller's first and last name and relationship to patient (if not the patient):      Best contact number: [phone]      Preferred date and time: Earliest available       Scheduled appointment date and time: N/A      Reason for appointment: PCP Requested follow up       Details to clarify the request: spreading shingles rash; pt able to schedule vv       Chelsea Bray

## 2020-07-24 NOTE — TELEPHONE ENCOUNTER
Patient states she needs a call back to get an appt, Virtual Visit Abril Eugene for Diagnosis of Shingles on Wednesday at Urgent Care that patient reports is Spreading & already taking antibiotics. Please call.  Thank you

## 2020-07-24 NOTE — TELEPHONE ENCOUNTER
Called, spoke to pt. Two identifiers confirmed. Pt stated she was dx with shingles at a [facility] clinic and given valtrex. Pt stated the rash is starting to move up her arms and feels her cheeks getting warm. Pt stated she was told to contact her PCP if rash kept getting worse. Recommended pt go to  instead of waiting through the weekend for an appointment Monday. Pt verbalized understanding of information discussed w/ no further questions at this time.

## 2020-08-16 NOTE — PROGRESS NOTES
HISTORY OF PRESENT ILLNESS  Donte Vargas is a 62 y.o. female. HPI     Donte Vargas is a 62 y.o. female being evaluated by a Virtual Visit (video visit) encounter to address concerns as mentioned above. A caregiver was present when appropriate. Due to this being a TeleHealth encounter (During BJUDY-78 public health emergency), evaluation of the following organ systems was limited: Vitals/Constitutional/EENT/Resp/CV/GI//MS/Neuro/Skin/Heme-Lymph-Imm. Pursuant to the emergency declaration under the [facility], [address] [place] [address] authority and the [employer] Act, this Virtual Visit was conducted with patient's (and/or legal guardian's) consent, to reduce the risk of exposure to COVID-19 and provide necessary medical care. Services were provided through a video synchronous discussion virtually to substitute for in-person encounter. --Tara Brown MD on 8/16/2020 at 11:39 AM    An electronic signature was used to authenticate this note. F/u anxiety, DM-2 ,HLD  hepatic steatosis obesity anxiety  Last a1c 6.2 LDL 79  fsbs-none  Weight down 7 lbs    On lexapro for anxiety and takes buspar prn    Had shingles 2 months ago-went minute clinic at [facility]-no pain now  Will get 2nd shingrix in 2 months  Laila Stephens for fibomyalgia and arthritis. Last OV   last a1c 6.5     Fasting fsbs < 120  Metformin increased to 1000mg bid for DM-2-dx last OV  Lowered carb intake   Walks for exercise  Feels lexapro helps for anxiety, takes buspar on ly         Patient Active Problem List    Diagnosis Date Noted    Anxiety 01/27/2020    Pure hypercholesterolemia 07/30/2019    Severe obesity (BMI 35.0-39. 9) with comorbidity ([address] 04/12/2018    FH: colon cancer 11/01/2017    Palpitation 02/17/2017    Hepatic steatosis 06/23/2016    Dislocation of prosthetic joint [facility] 07/25/2014    HLD (hyperlipidemia) 07/11/2014    Prediabetes 09/03/2013    Ischemic colitis ([facility] 09/03/2013    DJD (degenerative joint disease) of knee 06/04/2013    Fibromyalgia 09/30/2011    Insomnia 09/30/2011     Current Outpatient Medications   Medication Sig Dispense Refill    triamterene-hydroCHLOROthiazide (MAXZIDE) 37.5-25 mg per tablet Take 1 Tab by mouth daily. 90 Tab 3    escitalopram oxalate (LEXAPRO) 20 mg tablet Take 1 Tab by mouth daily. 90 Tab 3    multivitamin (ONE A DAY) tablet Take 1 Tab by mouth daily.  cyanocobalamin (VITAMIN B-12) 1,000 mcg tablet Take 1,000 mcg by mouth daily.  EPINEPHrine (EPIPEN) 0.3 mg/0.3 mL injection 0.3 mL by IntraMUSCular route once as needed for 1 dose. 1 Each 1    gabapentin (NEURONTIN) 100 mg tablet Take 300 mg by mouth two (2) times a day.  metFORMIN (GLUCOPHAGE) 500 mg tablet Take 2 Tabs by mouth two (2) times daily (with meals). 360 Tab 3    omeprazole (PRILOSEC) 20 mg capsule TAKE 1 CAPSULE DAILY 90 Cap 3    zolpidem (AMBIEN) 10 mg tablet TAKE 1 TABLET BY MOUTH AT BEDTIME 30 Tab 1    atorvastatin (LIPITOR) 10 mg tablet Take 1 Tab by mouth daily. Indications: high cholesterol and high triglycerides 90 Tab 3    cyclobenzaprine (Flexeril) 10 mg tablet Take 1 Tab by mouth two (2) times a day. 180 Tab 1    busPIRone (BUSPAR) 5 mg tablet Take 1 Tab by mouth two (2) times a day. 180 Tab 3    naltrexone (DEPADE) 50 mg tablet Take 50 mg by mouth daily. Allergies   Allergen Reactions    Latex Contact Dermatitis     Welts, redness, \"oozing\" per pt.     Strawberry Anaphylaxis    Demerol [Meperidine] Anaphylaxis    Hydromorphone Other (comments)     Nausea and hypotension    Lyrica [Pregabalin] Swelling     Feet and ankles and hands swelling    Morphine Other (comments)     Hypotension      Pcn [Penicillins] Hives      Lab Results   Component Value Date/Time    WBC 5.8 07/31/2019 08:55 AM    HGB 13.3 07/31/2019 08:55 AM    HCT 41.7 07/31/2019 08:55 AM    Hematocrit (POC) 41 07/27/2018 06:55 AM    PLATELET 986 72/25/1777 08:55 AM    MCV 89 07/31/2019 08:55 AM     Lab Results   Component Value Date/Time    Hemoglobin A1c 6.2 (H) 03/04/2020 08:59 AM    Hemoglobin A1c 6.5 (H) 07/31/2019 08:55 AM    Hemoglobin A1c 6.2 (H) 11/21/2018 08:53 AM    Glucose 105 (H) 03/04/2020 08:59 AM    Glucose (POC) 113 (H) 07/27/2018 06:55 AM    Glucose (POC) 86 06/09/2016 12:08 PM    Microalb/Creat ratio (ug/mg creat.) 7 03/04/2020 08:59 AM    LDL, calculated 79 03/04/2020 08:59 AM    Creatinine (POC) 0.8 07/27/2018 06:55 AM    Creatinine 0.68 03/04/2020 08:59 AM      Lab Results   Component Value Date/Time    Cholesterol, total 164 03/04/2020 08:59 AM    HDL Cholesterol 49 03/04/2020 08:59 AM    LDL, calculated 79 03/04/2020 08:59 AM    Triglyceride 179 (H) 03/04/2020 08:59 AM     Lab Results   Component Value Date/Time    GFR est non-AA 98 03/04/2020 08:59 AM    GFRNA, POC >60 07/27/2018 06:55 AM    GFR est  03/04/2020 08:59 AM    GFRAA, POC >60 07/27/2018 06:55 AM    Creatinine 0.68 03/04/2020 08:59 AM    Creatinine (POC) 0.8 07/27/2018 06:55 AM    BUN 23 03/04/2020 08:59 AM    BUN (POC) 24 (H) 07/27/2018 06:55 AM    Sodium 142 03/04/2020 08:59 AM    Sodium (POC) 141 07/27/2018 06:55 AM    Potassium 4.6 03/04/2020 08:59 AM    Potassium (POC) 3.4 (L) 07/27/2018 06:55 AM    Chloride 98 03/04/2020 08:59 AM    Chloride (POC) 98 07/27/2018 06:55 AM    CO2 25 03/04/2020 08:59 AM        ROS    Physical Exam  Constitutional:       Appearance: She is obese. Pulmonary:      Effort: Pulmonary effort is normal.   Neurological:      General: No focal deficit present. Mental Status: She is alert. Psychiatric:         Mood and Affect: Mood normal.         Behavior: Behavior normal.         Thought Content: Thought content normal.         Judgment: Judgment normal.         ASSESSMENT and PLAN  Diagnoses and all orders for this visit:    1.  Controlled type 2 diabetes mellitus without complication, without long-term current use of insulin (HCC)  -     METABOLIC PANEL, COMPREHENSIVE  -     HEMOGLOBIN A1C WITH EAG   Controlled per home readings  2. Pure hypercholesterolemia  -     METABOLIC PANEL, COMPREHENSIVE  -     LIPID PANEL  -     TSH 3RD GENERATION    3. Hepatic steatosis  -     TSH 3RD GENERATION   Continue with diet and exercise  4. Anxiety    Continue lexapro  5. Vitamin D deficiency  -     VITAMIN D, 25 HYDROXY   Advised pt to take otc vit d 1000 iu qd  6.  Preventive   Pt will get flu shot and shingrix    rtc 6 months

## 2020-08-18 ENCOUNTER — VIRTUAL VISIT (OUTPATIENT)
Dept: INTERNAL MEDICINE CLINIC | Age: 55
End: 2020-08-18
Payer: COMMERCIAL

## 2020-08-18 DIAGNOSIS — E11.9 CONTROLLED TYPE 2 DIABETES MELLITUS WITHOUT COMPLICATION, WITH LONG-TERM CURRENT USE OF INSULIN (HCC): ICD-10-CM

## 2020-08-18 DIAGNOSIS — F41.9 ANXIETY: ICD-10-CM

## 2020-08-18 DIAGNOSIS — E55.9 VITAMIN D DEFICIENCY: ICD-10-CM

## 2020-08-18 DIAGNOSIS — E78.00 PURE HYPERCHOLESTEROLEMIA: ICD-10-CM

## 2020-08-18 DIAGNOSIS — K76.0 HEPATIC STEATOSIS: ICD-10-CM

## 2020-08-18 DIAGNOSIS — E11.9 CONTROLLED TYPE 2 DIABETES MELLITUS WITHOUT COMPLICATION, WITHOUT LONG-TERM CURRENT USE OF INSULIN (HCC): Primary | ICD-10-CM

## 2020-08-18 DIAGNOSIS — Z79.4 CONTROLLED TYPE 2 DIABETES MELLITUS WITHOUT COMPLICATION, WITH LONG-TERM CURRENT USE OF INSULIN (HCC): ICD-10-CM

## 2020-08-18 DIAGNOSIS — F51.01 PRIMARY INSOMNIA: ICD-10-CM

## 2020-08-18 PROCEDURE — 99214 OFFICE O/P EST MOD 30 MIN: CPT | Performed by: INTERNAL MEDICINE

## 2020-08-18 RX ORDER — ATORVASTATIN CALCIUM 10 MG/1
10 TABLET, FILM COATED ORAL DAILY
Qty: 90 TAB | Refills: 3 | Status: SHIPPED | OUTPATIENT
Start: 2020-08-18 | End: 2021-03-04 | Stop reason: SDUPTHER

## 2020-08-18 RX ORDER — OMEPRAZOLE 20 MG/1
CAPSULE, DELAYED RELEASE ORAL
Qty: 90 CAP | Refills: 3 | Status: SHIPPED | OUTPATIENT
Start: 2020-08-18 | End: 2021-03-04 | Stop reason: SDUPTHER

## 2020-08-18 RX ORDER — ZOLPIDEM TARTRATE 10 MG/1
TABLET ORAL
Qty: 30 TAB | Refills: 1 | Status: SHIPPED | OUTPATIENT
Start: 2020-08-18 | End: 2021-02-07 | Stop reason: SDUPTHER

## 2020-08-18 RX ORDER — METFORMIN HYDROCHLORIDE 500 MG/1
1000 TABLET ORAL 2 TIMES DAILY WITH MEALS
Qty: 360 TAB | Refills: 3 | Status: SHIPPED | OUTPATIENT
Start: 2020-08-18 | End: 2021-03-04 | Stop reason: SDUPTHER

## 2020-08-18 RX ORDER — CYCLOBENZAPRINE HCL 10 MG
10 TABLET ORAL 2 TIMES DAILY
Qty: 180 TAB | Refills: 1 | Status: SHIPPED | OUTPATIENT
Start: 2020-08-18 | End: 2021-03-04 | Stop reason: SDUPTHER

## 2020-08-18 NOTE — PATIENT INSTRUCTIONS
This is an established visit conducted via telemedicine. The patient has been instructed that this meets HIPAA criteria and acknowledges and agrees to this method of visitation. Danitza Gupta [place] 
[birth year]  
 
8:29 AM 
 
Chief Complaint Patient presents with  Cholesterol Problem 6 month follow up  Diabetes 6 month follow up  Hypertension 6 month follow up

## 2020-08-18 NOTE — TELEPHONE ENCOUNTER
Future Appointments:  Future Appointments   Date Time Provider Law Melara   8/18/2020  8:15 AM Ailyn Ramirez MD [facility] BS AMB        Last Appointment With Me:  Visit date not found     Last Appointment My Department:  Visit date not found    Requested Prescriptions     Pending Prescriptions Disp Refills    metFORMIN (GLUCOPHAGE) 500 mg tablet 360 Tab 3     Sig: Take 2 Tabs by mouth two (2) times daily (with meals).  omeprazole (PRILOSEC) 20 mg capsule 90 Cap 3     Sig: TAKE 1 CAPSULE DAILY    zolpidem (AMBIEN) 10 mg tablet 30 Tab 1     Sig: TAKE 1 TABLET BY MOUTH AT BEDTIME    atorvastatin (LIPITOR) 10 mg tablet 90 Tab 3     Sig: Take 1 Tab by mouth daily. Indications: high cholesterol and high triglycerides    cyclobenzaprine (Flexeril) 10 mg tablet 180 Tab 1     Sig: Take 1 Tab by mouth two (2) times a day.

## 2020-08-31 RX ORDER — TRIAMTERENE/HYDROCHLOROTHIAZID 37.5-25 MG
1 TABLET ORAL DAILY
Qty: 90 TAB | Refills: 3 | Status: SHIPPED | OUTPATIENT
Start: 2020-08-31 | End: 2021-02-07 | Stop reason: SDUPTHER

## 2020-09-30 LAB
25(OH)D3+25(OH)D2 SERPL-MCNC: 36 NG/ML (ref 30–100)
ALBUMIN SERPL-MCNC: 4.6 G/DL (ref 3.8–4.9)
ALBUMIN/GLOB SERPL: 1.7 {RATIO} (ref 1.2–2.2)
ALP SERPL-CCNC: 115 IU/L (ref 39–117)
ALT SERPL-CCNC: 49 IU/L (ref 0–32)
AST SERPL-CCNC: 53 IU/L (ref 0–40)
BILIRUB SERPL-MCNC: 0.4 MG/DL (ref 0–1.2)
BUN SERPL-MCNC: 20 MG/DL (ref 6–24)
BUN/CREAT SERPL: 22 (ref 9–23)
CALCIUM SERPL-MCNC: 9.8 MG/DL (ref 8.7–10.2)
CHLORIDE SERPL-SCNC: 101 MMOL/L (ref 96–106)
CHOLEST SERPL-MCNC: 168 MG/DL (ref 100–199)
CO2 SERPL-SCNC: 26 MMOL/L (ref 20–29)
CREAT SERPL-MCNC: 0.91 MG/DL (ref 0.57–1)
EST. AVERAGE GLUCOSE BLD GHB EST-MCNC: 128 MG/DL
GLOBULIN SER CALC-MCNC: 2.7 G/DL (ref 1.5–4.5)
GLUCOSE SERPL-MCNC: 112 MG/DL (ref 65–99)
HBA1C MFR BLD: 6.1 % (ref 4.8–5.6)
HDLC SERPL-MCNC: 48 MG/DL
LDLC SERPL CALC-MCNC: 91 MG/DL (ref 0–99)
POTASSIUM SERPL-SCNC: 4 MMOL/L (ref 3.5–5.2)
PROT SERPL-MCNC: 7.3 G/DL (ref 6–8.5)
SODIUM SERPL-SCNC: 143 MMOL/L (ref 134–144)
TRIGL SERPL-MCNC: 170 MG/DL (ref 0–149)
TSH SERPL DL<=0.005 MIU/L-ACNC: 1.52 UIU/ML (ref 0.45–4.5)
VLDLC SERPL CALC-MCNC: 29 MG/DL (ref 5–40)

## 2020-10-27 ENCOUNTER — TRANSCRIBE ORDER (OUTPATIENT)
Dept: SCHEDULING | Age: 55
End: 2020-10-27

## 2020-10-27 DIAGNOSIS — Z12.31 ENCOUNTER FOR SCREENING MAMMOGRAM FOR MALIGNANT NEOPLASM OF BREAST: Primary | ICD-10-CM

## 2021-02-02 RX ORDER — ESCITALOPRAM OXALATE 20 MG/1
20 TABLET ORAL DAILY
Qty: 90 TAB | Refills: 3 | Status: SHIPPED | OUTPATIENT
Start: 2021-02-02 | End: 2021-03-04 | Stop reason: SDUPTHER

## 2021-02-02 NOTE — TELEPHONE ENCOUNTER
PCP: Rose Mary Belle MD    Last appt: 8/18/2020  Future Appointments   Date Time Provider Law Melara   2/15/2021 10:30 AM [facility] 2 [facility]   2/18/2021  8:45 AM Rose Mary Belle MD [facility] BS AMB       Requested Prescriptions     Pending Prescriptions Disp Refills    escitalopram oxalate (LEXAPRO) 20 mg tablet 90 Tab 3     Sig: Take 1 Tab by mouth daily. No visits with results within 3 Month(s) from this visit. Latest known visit with results is:   Virtual Visit on 08/18/2020   Component Date Value Ref Range Status    Glucose 09/29/2020 112* 65 - 99 mg/dL Final    BUN 09/29/2020 20  6 - 24 mg/dL Final    Creatinine 09/29/2020 0.91  0.57 - 1.00 mg/dL Final    GFR est non-AA 09/29/2020 70  >59 mL/min/1.73 Final    GFR est AA 09/29/2020 81  >59 mL/min/1.73 Final    BUN/Creatinine ratio 09/29/2020 22  9 - 23 Final    Sodium 09/29/2020 143  134 - 144 mmol/L Final    Potassium 09/29/2020 4.0  3.5 - 5.2 mmol/L Final    Chloride 09/29/2020 101  96 - 106 mmol/L Final    CO2 09/29/2020 26  20 - 29 mmol/L Final    Calcium 09/29/2020 9.8  8.7 - 10.2 mg/dL Final    Protein, total 09/29/2020 7.3  6.0 - 8.5 g/dL Final    Albumin 09/29/2020 4.6  3.8 - 4.9 g/dL Final    GLOBULIN, TOTAL 09/29/2020 2.7  1.5 - 4.5 g/dL Final    A-G Ratio 09/29/2020 1.7  1.2 - 2.2 Final    Bilirubin, total 09/29/2020 0.4  0.0 - 1.2 mg/dL Final    Alk.  phosphatase 09/29/2020 115  39 - 117 IU/L Final    AST (SGOT) 09/29/2020 53* 0 - 40 IU/L Final    ALT (SGPT) 09/29/2020 49* 0 - 32 IU/L Final    Hemoglobin A1c 09/29/2020 6.1* 4.8 - 5.6 % Final    Comment:          Prediabetes: 5.7 - 6.4           Diabetes: >6.4           Glycemic control for adults with diabetes: <7.0      Estimated average glucose 09/29/2020 128  mg/dL Final    Cholesterol, total 09/29/2020 168  100 - 199 mg/dL Final    Triglyceride 09/29/2020 170* 0 - 149 mg/dL Final    HDL Cholesterol 09/29/2020 48  >39 mg/dL Final    VLDL, calculated 09/29/2020 29  5 - 40 mg/dL Final    LDL, calculated 09/29/2020 91  0 - 99 mg/dL Final    TSH 09/29/2020 1.520  0.450 - 4.500 uIU/mL Final    VITAMIN D, 25-HYDROXY 09/29/2020 36.0  30.0 - 100.0 ng/mL Final    Comment: Vitamin D deficiency has been defined by the [address] practice guideline as a  level of serum 25-OH vitamin D less than 20 ng/mL (1,2). The Endocrine Society went on to further define vitamin D  insufficiency as a level between 21 and 29 ng/mL (2). 1. IOM ([employer] of [employer]). 2010. Dietary reference     intakes for calcium and D. [facility]: The     [place]. 2. Charlie MF, Josias NC, Carrie HERNANDEZ, et al.     Evaluation, treatment, and prevention of vitamin D     deficiency: an Endocrine Society clinical practice     guideline. JCEM. 2011 Jul; 96(7):1911-30.

## 2021-02-07 DIAGNOSIS — F51.01 PRIMARY INSOMNIA: ICD-10-CM

## 2021-02-08 RX ORDER — TRIAMTERENE/HYDROCHLOROTHIAZID 37.5-25 MG
1 TABLET ORAL DAILY
Qty: 90 TAB | Refills: 3 | Status: SHIPPED | OUTPATIENT
Start: 2021-02-08 | End: 2021-03-04 | Stop reason: SDUPTHER

## 2021-02-08 RX ORDER — ZOLPIDEM TARTRATE 10 MG/1
TABLET ORAL
Qty: 30 TAB | Refills: 1 | Status: SHIPPED | OUTPATIENT
Start: 2021-02-08 | End: 2021-03-04 | Stop reason: SDUPTHER

## 2021-02-08 NOTE — TELEPHONE ENCOUNTER
Future Appointments:  Future Appointments   Date Time Provider Law Bermudezi   2/15/2021 10:30 AM [facility] 2 [facility]   2/18/2021  8:45 AM Chay Chauhan MD [facility] BS AMB        Last Appointment With Me:  8/18/2020     Requested Prescriptions     Pending Prescriptions Disp Refills    zolpidem (AMBIEN) 10 mg tablet 30 Tab 1     Sig: TAKE 1 TABLET BY MOUTH AT BEDTIME    triamterene-hydroCHLOROthiazide (MAXZIDE) 37.5-25 mg per tablet 90 Tab 3     Sig: Take 1 Tab by mouth daily.

## 2021-02-13 RX ORDER — BUSPIRONE HYDROCHLORIDE 5 MG/1
TABLET ORAL
Qty: 180 TAB | Refills: 3 | Status: SHIPPED | OUTPATIENT
Start: 2021-02-13 | End: 2021-11-30

## 2021-02-15 ENCOUNTER — HOSPITAL ENCOUNTER (OUTPATIENT)
Dept: MAMMOGRAPHY | Age: 56
Discharge: HOME | End: 2021-02-15
Attending: INTERNAL MEDICINE
Payer: COMMERCIAL

## 2021-02-15 DIAGNOSIS — Z12.31 ENCOUNTER FOR SCREENING MAMMOGRAM FOR MALIGNANT NEOPLASM OF BREAST: ICD-10-CM

## 2021-02-15 PROCEDURE — 77063 BREAST TOMOSYNTHESIS BI: CPT

## 2021-02-25 ENCOUNTER — TRANSCRIBE ORDER (OUTPATIENT)
Dept: SCHEDULING | Age: 56
End: 2021-02-25

## 2021-02-25 DIAGNOSIS — S92.354K: Primary | ICD-10-CM

## 2021-02-25 DIAGNOSIS — M66.871 NONTRAUMATIC TEAR OF TIBIALIS POSTERIOR TENDON, RIGHT: ICD-10-CM

## 2021-02-27 ENCOUNTER — HOSPITAL ENCOUNTER (OUTPATIENT)
Dept: MRI IMAGING | Age: 56
Discharge: HOME | End: 2021-02-27
Attending: PODIATRIST
Payer: COMMERCIAL

## 2021-02-27 DIAGNOSIS — S92.354K: ICD-10-CM

## 2021-02-27 DIAGNOSIS — M66.871 NONTRAUMATIC TEAR OF TIBIALIS POSTERIOR TENDON, RIGHT: ICD-10-CM

## 2021-02-27 PROCEDURE — 73718 MRI LOWER EXTREMITY W/O DYE: CPT

## 2021-03-04 ENCOUNTER — OFFICE VISIT (OUTPATIENT)
Dept: INTERNAL MEDICINE CLINIC | Age: 56
End: 2021-03-04
Payer: COMMERCIAL

## 2021-03-04 VITALS
DIASTOLIC BLOOD PRESSURE: 77 MMHG | HEART RATE: 95 BPM | BODY MASS INDEX: 35.34 KG/M2 | WEIGHT: 207 LBS | OXYGEN SATURATION: 98 % | TEMPERATURE: 96.3 F | RESPIRATION RATE: 16 BRPM | HEIGHT: 64 IN | SYSTOLIC BLOOD PRESSURE: 123 MMHG

## 2021-03-04 DIAGNOSIS — E78.00 PURE HYPERCHOLESTEROLEMIA: ICD-10-CM

## 2021-03-04 DIAGNOSIS — F41.9 ANXIETY: ICD-10-CM

## 2021-03-04 DIAGNOSIS — E11.9 CONTROLLED TYPE 2 DIABETES MELLITUS WITHOUT COMPLICATION, WITH LONG-TERM CURRENT USE OF INSULIN (HCC): ICD-10-CM

## 2021-03-04 DIAGNOSIS — K76.0 HEPATIC STEATOSIS: ICD-10-CM

## 2021-03-04 DIAGNOSIS — I10 ESSENTIAL HYPERTENSION: ICD-10-CM

## 2021-03-04 DIAGNOSIS — Z79.4 CONTROLLED TYPE 2 DIABETES MELLITUS WITHOUT COMPLICATION, WITH LONG-TERM CURRENT USE OF INSULIN (HCC): ICD-10-CM

## 2021-03-04 DIAGNOSIS — F51.01 PRIMARY INSOMNIA: ICD-10-CM

## 2021-03-04 DIAGNOSIS — E11.9 TYPE 2 DIABETES MELLITUS WITHOUT COMPLICATION, WITHOUT LONG-TERM CURRENT USE OF INSULIN (HCC): Primary | ICD-10-CM

## 2021-03-04 PROCEDURE — 99214 OFFICE O/P EST MOD 30 MIN: CPT | Performed by: INTERNAL MEDICINE

## 2021-03-04 RX ORDER — TRIAMTERENE/HYDROCHLOROTHIAZID 37.5-25 MG
1 TABLET ORAL DAILY
Qty: 90 TAB | Refills: 3 | Status: SHIPPED | OUTPATIENT
Start: 2021-03-04 | End: 2022-03-14 | Stop reason: SDUPTHER

## 2021-03-04 RX ORDER — ZOLPIDEM TARTRATE 10 MG/1
TABLET ORAL
Qty: 30 TAB | Refills: 1 | Status: SHIPPED | OUTPATIENT
Start: 2021-03-04 | End: 2022-03-14 | Stop reason: SDUPTHER

## 2021-03-04 RX ORDER — ATORVASTATIN CALCIUM 10 MG/1
10 TABLET, FILM COATED ORAL DAILY
Qty: 90 TAB | Refills: 3 | Status: SHIPPED | OUTPATIENT
Start: 2021-03-04 | End: 2022-02-01

## 2021-03-04 RX ORDER — CYCLOBENZAPRINE HCL 10 MG
10 TABLET ORAL 2 TIMES DAILY
Qty: 180 TAB | Refills: 1 | Status: SHIPPED | OUTPATIENT
Start: 2021-03-04

## 2021-03-04 RX ORDER — OMEPRAZOLE 20 MG/1
CAPSULE, DELAYED RELEASE ORAL
Qty: 90 CAP | Refills: 3 | Status: SHIPPED | OUTPATIENT
Start: 2021-03-04 | End: 2022-03-07

## 2021-03-04 RX ORDER — METFORMIN HYDROCHLORIDE 500 MG/1
1000 TABLET ORAL 2 TIMES DAILY WITH MEALS
Qty: 360 TAB | Refills: 3 | Status: SHIPPED | OUTPATIENT
Start: 2021-03-04 | End: 2022-02-01

## 2021-03-04 RX ORDER — ESCITALOPRAM OXALATE 20 MG/1
20 TABLET ORAL DAILY
Qty: 90 TAB | Refills: 3 | Status: SHIPPED | OUTPATIENT
Start: 2021-03-04 | End: 2022-02-01

## 2021-03-04 NOTE — PATIENT INSTRUCTIONS
Office Policies Phone calls/patient messages: Please allow up to 24 hours for someone in the office to contact you about your call or message. Be mindful your provider may be out of the office or your message may require further review. We encourage you to use [email or web address] for your messages as this is a faster, more efficient way to communicate with our office Medication Refills: 
         
Prescription medications require 48-72 business hours to process. We encourage you to use [email or web address] for your refills. For controlled medications: Please allow 72 business hours to process. Certain medications may require you to  a written prescription at our office. NO narcotic/controlled medications will be prescribed after 4pm Monday through Friday or on weekends Form/Paperwork Completion: 
         
Please note a $25 fee may incur for all paperwork for completed by our providers. We ask that you allow 7-10 business days. Pre-payment is due prior to picking up/faxing the completed form. You may also download your forms to [email or web address] to have your doctor print off.

## 2021-03-04 NOTE — PROGRESS NOTES
HISTORY OF PRESENT ILLNESS  Hood Queen is a 60 y.o. female. HPI     F/u anxiety, DM-2 ,HLD  hepatic steatosis obesity anxiety , fibromyalgia-Dr Eligio Rachel  Last a1c 6.1 LDL 91  fsbs <130 . Some around 70  bp wnl at home  No cp sob or symptoms of neuropathy  Anxiety symptoms controlled most of the time on lexapro . Takes buspar prn only    Was seen at [address] [facility] in [place] [address] for wheezes and hoarseness--dx CAP vs bronchitsi, covid negative. Still using inhalers for wheezes but improving. Not sob ,nonsmoker  Sees Dr Eligio Rachel for Fibro today .`  Last OV  Last a1c 6.2 LDL 79  fsbs-none  Weight down 7 lbs     On lexapro for anxiety and takes buspar prn     Had shingles 2 months ago-went minute clinic at [facility]-no pain now  Will get 2nd shingrix in 2 months  Estel Poplin Dr Eligio Rachel for fibomyalgia and arthritis.       Patient Active Problem List    Diagnosis Date Noted    Anxiety 01/27/2020    Pure hypercholesterolemia 07/30/2019    Severe obesity (BMI 35.0-39. 9) with comorbidity ([address] 04/12/2018    FH: colon cancer 11/01/2017    Palpitation 02/17/2017    Hepatic steatosis 06/23/2016    Dislocation of prosthetic joint ([address] 07/25/2014    HLD (hyperlipidemia) 07/11/2014    Prediabetes 09/03/2013    Ischemic colitis ([facility] 09/03/2013    DJD (degenerative joint disease) of knee 06/04/2013    Fibromyalgia 09/30/2011    Insomnia 09/30/2011     Current Outpatient Medications   Medication Sig Dispense Refill    busPIRone (BUSPAR) 5 mg tablet TAKE 1 TABLET BY MOUTH TWICE A  Tab 3    multivitamin (ONE A DAY) tablet Take 1 Tab by mouth daily.  cyanocobalamin (VITAMIN B-12) 1,000 mcg tablet Take 1,000 mcg by mouth daily.  EPINEPHrine (EPIPEN) 0.3 mg/0.3 mL injection 0.3 mL by IntraMUSCular route once as needed for 1 dose. 1 Each 1    gabapentin (NEURONTIN) 100 mg tablet Take 300 mg by mouth two (2) times a day.  atorvastatin (LIPITOR) 10 mg tablet Take 1 Tab by mouth daily.  Indications: high cholesterol and high triglycerides 90 Tab 3    cyclobenzaprine (Flexeril) 10 mg tablet Take 1 Tab by mouth two (2) times a day. 180 Tab 1    escitalopram oxalate (LEXAPRO) 20 mg tablet Take 1 Tab by mouth daily. 90 Tab 3    metFORMIN (GLUCOPHAGE) 500 mg tablet Take 2 Tabs by mouth two (2) times daily (with meals). 360 Tab 3    omeprazole (PRILOSEC) 20 mg capsule TAKE 1 CAPSULE DAILY 90 Cap 3    triamterene-hydroCHLOROthiazide (MAXZIDE) 37.5-25 mg per tablet Take 1 Tab by mouth daily. 90 Tab 3    zolpidem (AMBIEN) 10 mg tablet TAKE 1 TABLET BY MOUTH AT BEDTIME 30 Tab 1    naltrexone (DEPADE) 50 mg tablet Take 50 mg by mouth daily. Allergies   Allergen Reactions    Latex Contact Dermatitis     Welts, redness, \"oozing\" per pt.     Strawberry Anaphylaxis    Demerol [Meperidine] Anaphylaxis    Hydromorphone Other (comments)     Nausea and hypotension    Lyrica [Pregabalin] Swelling     Feet and ankles and hands swelling    Morphine Other (comments)     Hypotension      Pcn [Penicillins] Hives      Lab Results   Component Value Date/Time    WBC 5.8 07/31/2019 08:55 AM    HGB 13.3 07/31/2019 08:55 AM    HCT 41.7 07/31/2019 08:55 AM    Hematocrit (POC) 41 07/27/2018 06:55 AM    PLATELET 556 93/76/0179 08:55 AM    MCV 89 07/31/2019 08:55 AM     Lab Results   Component Value Date/Time    Hemoglobin A1c 6.1 (H) 09/29/2020 08:28 AM    Hemoglobin A1c 6.2 (H) 03/04/2020 08:59 AM    Hemoglobin A1c 6.5 (H) 07/31/2019 08:55 AM    Glucose 112 (H) 09/29/2020 08:28 AM    Glucose (POC) 113 (H) 07/27/2018 06:55 AM    Glucose (POC) 86 06/09/2016 12:08 PM    Microalb/Creat ratio (ug/mg creat.) 7 03/04/2020 08:59 AM    LDL, calculated 91 09/29/2020 08:28 AM    LDL, calculated 79 03/04/2020 08:59 AM    Creatinine (POC) 0.8 07/27/2018 06:55 AM    Creatinine 0.91 09/29/2020 08:28 AM      Lab Results   Component Value Date/Time    Cholesterol, total 168 09/29/2020 08:28 AM    HDL Cholesterol 48 09/29/2020 08:28 AM    LDL, calculated 91 09/29/2020 08:28 AM    LDL, calculated 79 03/04/2020 08:59 AM    Triglyceride 170 (H) 09/29/2020 08:28 AM     Lab Results   Component Value Date/Time    GFR est non-AA 70 09/29/2020 08:28 AM    GFRNA, POC >60 07/27/2018 06:55 AM    GFR est AA 81 09/29/2020 08:28 AM    GFRAA, POC >60 07/27/2018 06:55 AM    Creatinine 0.91 09/29/2020 08:28 AM    Creatinine (POC) 0.8 07/27/2018 06:55 AM    BUN 20 09/29/2020 08:28 AM    BUN (POC) 24 (H) 07/27/2018 06:55 AM    Sodium 143 09/29/2020 08:28 AM    Sodium (POC) 141 07/27/2018 06:55 AM    Potassium 4.0 09/29/2020 08:28 AM    Potassium (POC) 3.4 (L) 07/27/2018 06:55 AM    Chloride 101 09/29/2020 08:28 AM    Chloride (POC) 98 07/27/2018 06:55 AM    CO2 26 09/29/2020 08:28 AM        ROS    Physical Exam  Vitals signs and nursing note reviewed. Constitutional:       Appearance: She is well-developed. She is obese. Comments: Appears stated age   Cardiovascular:      Rate and Rhythm: Normal rate and regular rhythm. Heart sounds: Normal heart sounds. No murmur. No friction rub. No gallop. Pulmonary:      Effort: Pulmonary effort is normal. No respiratory distress. Breath sounds: Normal breath sounds. No wheezing. Abdominal:      General: Bowel sounds are normal.      Palpations: Abdomen is soft. Neurological:      Mental Status: She is alert. ASSESSMENT and PLAN  Diagnoses and all orders for this visit:    1. Type 2 diabetes mellitus without complication, without long-term current use of insulin (HCC)  -     CBC W/O DIFF  -     METABOLIC PANEL, COMPREHENSIVE  -     HEMOGLOBIN A1C WITH EAG  -     MICROALBUMIN, UR, RAND W/ MICROALB/CREAT RATIO   Controlled per fsbs readings Will see podiatrist for foot exam  2. Essential hypertension  -     METABOLIC PANEL, COMPREHENSIVE   Good control  3. Pure hypercholesterolemia  -     METABOLIC PANEL, COMPREHENSIVE  -     LIPID PANEL  -     TSH 3RD GENERATION   On statin  4.  Hepatic steatosis  -     METABOLIC PANEL, COMPREHENSIVE   Work on diet and weight reduction  5. Anxiety   Controlled on SSRI    6.  Fibromyalgia   Fu rheum MD

## 2021-03-04 NOTE — TELEPHONE ENCOUNTER
Future Appointments:  No future appointments. Last Appointment With Me:  3/4/2021     Requested Prescriptions     Pending Prescriptions Disp Refills    atorvastatin (LIPITOR) 10 mg tablet 90 Tab 3     Sig: Take 1 Tab by mouth daily. Indications: high cholesterol and high triglycerides    cyclobenzaprine (Flexeril) 10 mg tablet 180 Tab 1     Sig: Take 1 Tab by mouth two (2) times a day.  escitalopram oxalate (LEXAPRO) 20 mg tablet 90 Tab 3     Sig: Take 1 Tab by mouth daily.  metFORMIN (GLUCOPHAGE) 500 mg tablet 360 Tab 3     Sig: Take 2 Tabs by mouth two (2) times daily (with meals).  omeprazole (PRILOSEC) 20 mg capsule 90 Cap 3     Sig: TAKE 1 CAPSULE DAILY    triamterene-hydroCHLOROthiazide (MAXZIDE) 37.5-25 mg per tablet 90 Tab 3     Sig: Take 1 Tab by mouth daily.     zolpidem (AMBIEN) 10 mg tablet 30 Tab 1     Sig: TAKE 1 TABLET BY MOUTH AT BEDTIME

## 2021-03-05 LAB
ALBUMIN SERPL-MCNC: 4.6 G/DL (ref 3.8–4.9)
ALBUMIN/CREAT UR: 7 MG/G CREAT (ref 0–29)
ALBUMIN/GLOB SERPL: 1.8 {RATIO} (ref 1.2–2.2)
ALP SERPL-CCNC: 115 IU/L (ref 39–117)
ALT SERPL-CCNC: 51 IU/L (ref 0–32)
AST SERPL-CCNC: 44 IU/L (ref 0–40)
BILIRUB SERPL-MCNC: 0.3 MG/DL (ref 0–1.2)
BUN SERPL-MCNC: 23 MG/DL (ref 6–24)
BUN/CREAT SERPL: 32 (ref 9–23)
CALCIUM SERPL-MCNC: 9.7 MG/DL (ref 8.7–10.2)
CHLORIDE SERPL-SCNC: 99 MMOL/L (ref 96–106)
CHOLEST SERPL-MCNC: 170 MG/DL (ref 100–199)
CO2 SERPL-SCNC: 29 MMOL/L (ref 20–29)
CREAT SERPL-MCNC: 0.73 MG/DL (ref 0.57–1)
CREAT UR-MCNC: 151.5 MG/DL
ERYTHROCYTE [DISTWIDTH] IN BLOOD BY AUTOMATED COUNT: 13.1 % (ref 11.7–15.4)
EST. AVERAGE GLUCOSE BLD GHB EST-MCNC: 131 MG/DL
GLOBULIN SER CALC-MCNC: 2.6 G/DL (ref 1.5–4.5)
GLUCOSE SERPL-MCNC: 88 MG/DL (ref 65–99)
HBA1C MFR BLD: 6.2 % (ref 4.8–5.6)
HCT VFR BLD AUTO: 40.2 % (ref 34–46.6)
HDLC SERPL-MCNC: 53 MG/DL
HGB BLD-MCNC: 13.1 G/DL (ref 11.1–15.9)
LDLC SERPL CALC-MCNC: 89 MG/DL (ref 0–99)
MCH RBC QN AUTO: 28.9 PG (ref 26.6–33)
MCHC RBC AUTO-ENTMCNC: 32.6 G/DL (ref 31.5–35.7)
MCV RBC AUTO: 89 FL (ref 79–97)
MICROALBUMIN UR-MCNC: 11 UG/ML
PLATELET # BLD AUTO: 309 X10E3/UL (ref 150–450)
POTASSIUM SERPL-SCNC: 4.1 MMOL/L (ref 3.5–5.2)
PROT SERPL-MCNC: 7.2 G/DL (ref 6–8.5)
RBC # BLD AUTO: 4.53 X10E6/UL (ref 3.77–5.28)
SODIUM SERPL-SCNC: 142 MMOL/L (ref 134–144)
TRIGL SERPL-MCNC: 166 MG/DL (ref 0–149)
TSH SERPL DL<=0.005 MIU/L-ACNC: 1.17 UIU/ML (ref 0.45–4.5)
VLDLC SERPL CALC-MCNC: 28 MG/DL (ref 5–40)
WBC # BLD AUTO: 6.9 X10E3/UL (ref 3.4–10.8)

## 2021-04-13 NOTE — PERIOP NOTES
[facility]  Ambulatory Surgery Unit  Pre-operative Instructions    Surgery/Procedure Date  4/21/2021            Tentative Arrival Time 1000am      1. On the day of your surgery/procedure, please report to the Ambulatory Surgery Unit Registration Desk and sign in at your designated time. The Ambulatory Surgery Unit is located in [facility] on the Critical access hospital side of the [facility] across from the [address] [place] [address]. Please have all of your health insurance cards and a photo ID. 2. You must have someone with you to drive you home, as you should not drive a car for 24 hours following anesthesia. Please make arrangements for a responsible adult friend or family member to stay with you for at least the first 24 hours after your surgery. 3. Do not have anything to eat or drink (including water, gum, mints, coffee, juice) after 11:59 PM  Tuesday 20th. This may not apply to medications prescribed by your physician. (Please note below the special instructions with medications to take the morning of surgery, if applicable.)    4. We recommend you do not drink any alcoholic beverages for 24 hours before and after your surgery. 5. Contact your surgeons office for instructions on the following medications: non-steroidal anti-inflammatory drugs (i.e. Advil, Aleve), vitamins, and supplements. (Some surgeons will want you to stop these medications prior to surgery and others may allow you to take them)   **If you are currently taking Plavix, Coumadin, Aspirin and/or other blood-thinning agents, contact your surgeon for instructions. ** Your surgeon will partner with the physician prescribing these medications to determine if it is safe to stop or if you need to continue taking. Please do not stop taking these medications without instructions from your surgeon.     6. In an effort to help prevent surgical site infection, we ask that you shower with an anti-bacterial soap (i.e. Dial/Safeguard, or the soap provided to you at your preadmission testing appointment) for 3 days prior to and on the morning of surgery, using a fresh towel after each shower. (Please begin this process with fresh bed linens.) Do not apply any lotions, powders, or deodorants after the shower on the day of your procedure. If applicable, please do not shave the operative site for 48 hours prior to surgery. 7. Wear comfortable clothes. Wear glasses instead of contacts. Do not bring any jewelry or money (other than copays or fees as instructed). Do not wear make-up, particularly mascara, the morning of your surgery. Do not wear nail polish, particularly if you are having foot /hand surgery. Wear your hair loose or down, no ponytails, buns, silvestre pins or clips. All body piercings must be removed. 8. You should understand that if you do not follow these instructions your surgery may be cancelled. If your physical condition changes (i.e. fever, cold or flu) please contact your surgeon as soon as possible. 9. It is important that you be on time. If a situation occurs where you may be late, or if you have any questions or problems, please call [phone].    10. Your surgery time may be subject to change. You will receive a phone call the day prior to surgery to confirm your arrival time. 11. Pediatric patients: please bring a change of clothes, diapers, bottle/sippy cup, pacifier, etc.      Special Instructions: Take all medications and inhalers, as prescribed, on the morning of surgery with a sip of water EXCEPT: n/a      Insulin Dependent Diabetic patients: Take your diabetic medications as prescribed the day before surgery. Hold all diabetic medications the day of surgery. If you are scheduled to arrive for surgery after 8:00 AM, and your AM blood sugar is >200, please call Ambulatory Surgery. I understand a pre-operative phone call will be made to verify my surgery time.   In the event that I am not available, I give permission for a message to be left on my answering service and/or with another person?       yes         ___________________      ___________________      ________________  (Signature of Patient)          (Witness)                   (Date and Time)

## 2021-04-14 RX ORDER — CLINDAMYCIN PHOSPHATE 900 MG/50ML
900 INJECTION INTRAVENOUS ONCE
Status: CANCELLED | OUTPATIENT
Start: 2021-04-14 | End: 2021-04-14

## 2021-04-15 NOTE — H&P
[address]  HISTORY AND PHYSICAL    Name:  Sarai Zelaya  MR#:  [id number]  :  1965  ACCOUNT #:  [de-identified]  ADMIT DATE:  2021    LOCATION:  [facility]    CHIEF COMPLAINT:  Painful right foot. HISTORY OF PRESENT ILLNESS:  This is a 57-year-old female patient who has successful treatments for prediabetes, and has significant pain of the right foot after having a sprain and a fracture of the fifth metatarsal and tear of the peroneal tendons, right foot. Also impingement lesions in the subtalar joint causing perceived instability and pain furthermore along the lateral right foot. She has had previous surgery and now over a year of conservative treatment with no improvement. MRI confirms tear and significant hypertrophic increased chronic appearance of the peroneal tendons. Effusion from the subtalar joint is also seen. The risks and potential complications for tenodesis procedure with Topaz coblation as well as arthroscopic debridement of the subtalar joint were discussed with the patient, she understands. She wishes to proceed. PAST MEDICAL HISTORY:  Anxiety, hypercholesterolemia, obesity, colon cancer, hepatic steatosis, dislocation of prosthetic joint, prediabetes, ischemic colitis, fibromyalgia, and insomnia. CURRENT OUTPATIENT MEDICINES:  BuSpar 5 mg, multivitamin, epinephrine as needed, gabapentin 100 mg tabs 300 mg b.i.d., Lipitor 10 mg, Flexeril 10 mg, Lexapro, metformin 500 mg, Prilosec 20 mg, Ambien 10 mg, Naltrexone 50 mg. ALLERGIES:  PENICILLIN, MORPHINE, LYRICA, HYDROMORPHONE, DEMEROL, STRAWBERRY. PAST SURGICAL HISTORY:  As stated above. FAMILY HISTORY:  On the paternal side essential hypertension, neurological disorder, Alzheimer's disease, bilateral cataracts. On the maternal side acute arthritis, essential hypertension. SOCIAL HISTORY:  The patient is active, tries to be active and continue walking and such. Denies tobacco and alcohol use. No history of recreational drug use or abuse. REVIEW OF SYSTEMS:  Negative for this patient. PHYSICAL EXAMINATION:  GENERAL:  In no distress. VITAL SIGNS:  Stable. ENT:  Noncontributory. CARDIAC:  Normal rate and rhythm, no palpitations. PULMONARY:  No wheezing, no shortness of breath. ABDOMEN:  No painful quadrants, no organomegaly. INTEGUMENT:  Within normal limits, no pigmentations, no ulcerations. VASCULAR:  Palpable pedal pulses bilaterally. No lymphatic tenderness in bilateral lower extremities. NEUROLOGICAL:  Grossly intact lower extremity exam, no Tinel's sign elicited, DTRs +2 and symmetrical.  MUSCULOSKELETAL:  5/5 muscle strength seen, normal eversion strength for the peroneal groups in lateral foot and ankle, right. Significant pain with hypertrophy along the peroneal tendon course laterally. ASSESSMENT:  Peroneal tendon synovitis with fibrosis and fusiform enlargement, right foot. Impingement lesion subtalar joint, right foot. TREATMENT PLAN:  The patient understands the risks and potential complications, these are discussed thoroughly. She understands and wishes to proceed with the recommended surgical procedures. She understands she will be nonweightbearing up to 6 weeks in a cast with 5 more weeks of protective cast weightbearing after that. Swelling and numbness can linger greater than 1 year and this was also discussed. The patient is agreeable and understands the course as well as the reasoning for the surgical procedure.       Heidy Correia MD      [id number]  D:  04/14/2021 16:47  T:  04/14/2021 23:23  JOB #:  [id number]

## 2021-04-17 ENCOUNTER — HOSPITAL ENCOUNTER (OUTPATIENT)
Dept: PREADMISSION TESTING | Age: 56
Discharge: HOME | End: 2021-04-17
Payer: COMMERCIAL

## 2021-04-17 LAB — SARS-COV-2, COV2: NORMAL

## 2021-04-17 PROCEDURE — U0003 INFECTIOUS AGENT DETECTION BY NUCLEIC ACID (DNA OR RNA); SEVERE ACUTE RESPIRATORY SYNDROME CORONAVIRUS 2 (SARS-COV-2) (CORONAVIRUS DISEASE [COVID-19]), AMPLIFIED PROBE TECHNIQUE, MAKING USE OF HIGH THROUGHPUT TECHNOLOGIES AS DESCRIBED BY CMS-2020-01-R: HCPCS

## 2021-04-18 LAB — SARS-COV-2, COV2NT: NOT DETECTED

## 2021-04-20 ENCOUNTER — ANESTHESIA EVENT (OUTPATIENT)
Dept: SURGERY | Age: 56
End: 2021-04-20
Payer: COMMERCIAL

## 2021-04-21 ENCOUNTER — ANESTHESIA (OUTPATIENT)
Dept: SURGERY | Age: 56
End: 2021-04-21
Payer: COMMERCIAL

## 2021-04-21 ENCOUNTER — HOSPITAL ENCOUNTER (OUTPATIENT)
Age: 56
Setting detail: OUTPATIENT SURGERY
Discharge: HOME | End: 2021-04-21
Attending: PODIATRIST | Admitting: PODIATRIST
Payer: COMMERCIAL

## 2021-04-21 VITALS
BODY MASS INDEX: 35.85 KG/M2 | RESPIRATION RATE: 17 BRPM | WEIGHT: 210 LBS | OXYGEN SATURATION: 95 % | TEMPERATURE: 97.9 F | DIASTOLIC BLOOD PRESSURE: 66 MMHG | HEIGHT: 64 IN | HEART RATE: 77 BPM | SYSTOLIC BLOOD PRESSURE: 101 MMHG

## 2021-04-21 LAB
GLUCOSE BLD STRIP.AUTO-MCNC: 92 MG/DL (ref 65–100)
GLUCOSE BLD STRIP.AUTO-MCNC: 99 MG/DL (ref 65–100)
SERVICE CMNT-IMP: NORMAL
SERVICE CMNT-IMP: NORMAL

## 2021-04-21 PROCEDURE — 77030008753 HC TU IRR IN/OUT FLO S&N -B: Performed by: PODIATRIST

## 2021-04-21 PROCEDURE — 77030021352 HC CBL LD SYS DISP COVD -B: Performed by: PODIATRIST

## 2021-04-21 PROCEDURE — 74011250636 HC RX REV CODE- 250/636: Performed by: NURSE ANESTHETIST, CERTIFIED REGISTERED

## 2021-04-21 PROCEDURE — 74011000250 HC RX REV CODE- 250: Performed by: PODIATRIST

## 2021-04-21 PROCEDURE — 74011000250 HC RX REV CODE- 250: Performed by: NURSE ANESTHETIST, CERTIFIED REGISTERED

## 2021-04-21 PROCEDURE — 74011250636 HC RX REV CODE- 250/636: Performed by: PODIATRIST

## 2021-04-21 PROCEDURE — 77030019927 HC TBNG IRR CYSTO BAXT -A: Performed by: PODIATRIST

## 2021-04-21 PROCEDURE — 74011250637 HC RX REV CODE- 250/637: Performed by: PODIATRIST

## 2021-04-21 PROCEDURE — 76060000063 HC AMB SURG ANES 1.5 TO 2 HR: Performed by: PODIATRIST

## 2021-04-21 PROCEDURE — 77030018834: Performed by: PODIATRIST

## 2021-04-21 PROCEDURE — 77030031139 HC SUT VCRL2 J&J -A: Performed by: PODIATRIST

## 2021-04-21 PROCEDURE — 77030000032 HC CUF TRNQT ZIMM -B: Performed by: PODIATRIST

## 2021-04-21 PROCEDURE — 76210000040 HC AMBSU PH I REC FIRST 0.5 HR: Performed by: PODIATRIST

## 2021-04-21 PROCEDURE — 2709999900 HC NON-CHARGEABLE SUPPLY: Performed by: PODIATRIST

## 2021-04-21 PROCEDURE — 77030002916 HC SUT ETHLN J&J -A: Performed by: PODIATRIST

## 2021-04-21 PROCEDURE — 77030026438 HC STYL ET INTUB CARD -A: Performed by: NURSE ANESTHETIST, CERTIFIED REGISTERED

## 2021-04-21 PROCEDURE — 77030008684 HC TU ET CUF COVD -B: Performed by: NURSE ANESTHETIST, CERTIFIED REGISTERED

## 2021-04-21 PROCEDURE — 82962 GLUCOSE BLOOD TEST: CPT

## 2021-04-21 PROCEDURE — 76210000046 HC AMBSU PH II REC FIRST 0.5 HR: Performed by: PODIATRIST

## 2021-04-21 PROCEDURE — 76030000003 HC AMB SURG OR TIME 1.5 TO 2: Performed by: PODIATRIST

## 2021-04-21 PROCEDURE — 74011250636 HC RX REV CODE- 250/636: Performed by: ANESTHESIOLOGY

## 2021-04-21 PROCEDURE — 77030002913 HC SUT ETHBND J&J -B: Performed by: PODIATRIST

## 2021-04-21 RX ORDER — FENTANYL CITRATE 50 UG/ML
INJECTION, SOLUTION INTRAMUSCULAR; INTRAVENOUS AS NEEDED
Status: DISCONTINUED | OUTPATIENT
Start: 2021-04-21 | End: 2021-04-21 | Stop reason: HOSPADM

## 2021-04-21 RX ORDER — SODIUM CHLORIDE 0.9 % (FLUSH) 0.9 %
5-40 SYRINGE (ML) INJECTION AS NEEDED
Status: DISCONTINUED | OUTPATIENT
Start: 2021-04-21 | End: 2021-04-21 | Stop reason: HOSPADM

## 2021-04-21 RX ORDER — SODIUM CHLORIDE, SODIUM LACTATE, POTASSIUM CHLORIDE, CALCIUM CHLORIDE 600; 310; 30; 20 MG/100ML; MG/100ML; MG/100ML; MG/100ML
25 INJECTION, SOLUTION INTRAVENOUS CONTINUOUS
Status: DISCONTINUED | OUTPATIENT
Start: 2021-04-21 | End: 2021-04-21 | Stop reason: HOSPADM

## 2021-04-21 RX ORDER — SODIUM CHLORIDE 0.9 % (FLUSH) 0.9 %
5-40 SYRINGE (ML) INJECTION EVERY 8 HOURS
Status: DISCONTINUED | OUTPATIENT
Start: 2021-04-21 | End: 2021-04-21 | Stop reason: HOSPADM

## 2021-04-21 RX ORDER — ROPIVACAINE HYDROCHLORIDE 5 MG/ML
INJECTION, SOLUTION EPIDURAL; INFILTRATION; PERINEURAL
Status: COMPLETED
Start: 2021-04-21 | End: 2021-04-21

## 2021-04-21 RX ORDER — DEXMEDETOMIDINE HYDROCHLORIDE 100 UG/ML
INJECTION, SOLUTION INTRAVENOUS
Status: DISCONTINUED
Start: 2021-04-21 | End: 2021-04-21 | Stop reason: HOSPADM

## 2021-04-21 RX ORDER — OXYCODONE AND ACETAMINOPHEN 5; 325 MG/1; MG/1
1 TABLET ORAL
Status: DISCONTINUED | OUTPATIENT
Start: 2021-04-21 | End: 2021-04-21 | Stop reason: HOSPADM

## 2021-04-21 RX ORDER — ROCURONIUM BROMIDE 10 MG/ML
INJECTION, SOLUTION INTRAVENOUS AS NEEDED
Status: DISCONTINUED | OUTPATIENT
Start: 2021-04-21 | End: 2021-04-21 | Stop reason: HOSPADM

## 2021-04-21 RX ORDER — LIDOCAINE HYDROCHLORIDE 20 MG/ML
INJECTION, SOLUTION EPIDURAL; INFILTRATION; INTRACAUDAL; PERINEURAL AS NEEDED
Status: DISCONTINUED | OUTPATIENT
Start: 2021-04-21 | End: 2021-04-21 | Stop reason: HOSPADM

## 2021-04-21 RX ORDER — CLINDAMYCIN PHOSPHATE 900 MG/50ML
900 INJECTION INTRAVENOUS ONCE
Status: COMPLETED | OUTPATIENT
Start: 2021-04-21 | End: 2021-04-21

## 2021-04-21 RX ORDER — ONDANSETRON 2 MG/ML
INJECTION INTRAMUSCULAR; INTRAVENOUS AS NEEDED
Status: DISCONTINUED | OUTPATIENT
Start: 2021-04-21 | End: 2021-04-21 | Stop reason: HOSPADM

## 2021-04-21 RX ORDER — MIDAZOLAM HYDROCHLORIDE 1 MG/ML
INJECTION, SOLUTION INTRAMUSCULAR; INTRAVENOUS
Status: COMPLETED
Start: 2021-04-21 | End: 2021-04-21

## 2021-04-21 RX ORDER — SUCCINYLCHOLINE CHLORIDE 20 MG/ML
INJECTION INTRAMUSCULAR; INTRAVENOUS AS NEEDED
Status: DISCONTINUED | OUTPATIENT
Start: 2021-04-21 | End: 2021-04-21 | Stop reason: HOSPADM

## 2021-04-21 RX ORDER — FENTANYL CITRATE 50 UG/ML
INJECTION, SOLUTION INTRAMUSCULAR; INTRAVENOUS
Status: COMPLETED
Start: 2021-04-21 | End: 2021-04-21

## 2021-04-21 RX ORDER — LIDOCAINE HYDROCHLORIDE 10 MG/ML
0.1 INJECTION, SOLUTION EPIDURAL; INFILTRATION; INTRACAUDAL; PERINEURAL AS NEEDED
Status: DISCONTINUED | OUTPATIENT
Start: 2021-04-21 | End: 2021-04-21 | Stop reason: HOSPADM

## 2021-04-21 RX ORDER — DEXAMETHASONE SODIUM PHOSPHATE 4 MG/ML
INJECTION, SOLUTION INTRA-ARTICULAR; INTRALESIONAL; INTRAMUSCULAR; INTRAVENOUS; SOFT TISSUE AS NEEDED
Status: DISCONTINUED | OUTPATIENT
Start: 2021-04-21 | End: 2021-04-21 | Stop reason: HOSPADM

## 2021-04-21 RX ORDER — ROPIVACAINE HYDROCHLORIDE 5 MG/ML
INJECTION, SOLUTION EPIDURAL; INFILTRATION; PERINEURAL
Status: DISCONTINUED | OUTPATIENT
Start: 2021-04-21 | End: 2021-04-21 | Stop reason: HOSPADM

## 2021-04-21 RX ORDER — MIDAZOLAM HYDROCHLORIDE 1 MG/ML
INJECTION, SOLUTION INTRAMUSCULAR; INTRAVENOUS AS NEEDED
Status: DISCONTINUED | OUTPATIENT
Start: 2021-04-21 | End: 2021-04-21 | Stop reason: HOSPADM

## 2021-04-21 RX ORDER — BUPIVACAINE HYDROCHLORIDE AND EPINEPHRINE 5; 5 MG/ML; UG/ML
INJECTION, SOLUTION EPIDURAL; INTRACAUDAL; PERINEURAL AS NEEDED
Status: DISCONTINUED | OUTPATIENT
Start: 2021-04-21 | End: 2021-04-21 | Stop reason: HOSPADM

## 2021-04-21 RX ORDER — PROPOFOL 10 MG/ML
INJECTION, EMULSION INTRAVENOUS AS NEEDED
Status: DISCONTINUED | OUTPATIENT
Start: 2021-04-21 | End: 2021-04-21 | Stop reason: HOSPADM

## 2021-04-21 RX ORDER — DIPHENHYDRAMINE HYDROCHLORIDE 50 MG/ML
12.5 INJECTION, SOLUTION INTRAMUSCULAR; INTRAVENOUS AS NEEDED
Status: DISCONTINUED | OUTPATIENT
Start: 2021-04-21 | End: 2021-04-21 | Stop reason: HOSPADM

## 2021-04-21 RX ORDER — FENTANYL CITRATE 50 UG/ML
25 INJECTION, SOLUTION INTRAMUSCULAR; INTRAVENOUS
Status: DISCONTINUED | OUTPATIENT
Start: 2021-04-21 | End: 2021-04-21 | Stop reason: HOSPADM

## 2021-04-21 RX ORDER — KETAMINE HYDROCHLORIDE 100 MG/ML
INJECTION, SOLUTION INTRAMUSCULAR; INTRAVENOUS
Status: DISCONTINUED
Start: 2021-04-21 | End: 2021-04-21 | Stop reason: WASHOUT

## 2021-04-21 RX ADMIN — MIDAZOLAM HYDROCHLORIDE 2 MG: 1 INJECTION, SOLUTION INTRAMUSCULAR; INTRAVENOUS at 11:35

## 2021-04-21 RX ADMIN — FENTANYL CITRATE 75 MCG: 50 INJECTION, SOLUTION INTRAMUSCULAR; INTRAVENOUS at 11:01

## 2021-04-21 RX ADMIN — SODIUM CHLORIDE, POTASSIUM CHLORIDE, SODIUM LACTATE AND CALCIUM CHLORIDE: 600; 310; 30; 20 INJECTION, SOLUTION INTRAVENOUS at 10:51

## 2021-04-21 RX ADMIN — ONDANSETRON HYDROCHLORIDE 4 MG: 2 INJECTION, SOLUTION INTRAMUSCULAR; INTRAVENOUS at 12:47

## 2021-04-21 RX ADMIN — CLINDAMYCIN PHOSPHATE 900 MG: 18 INJECTION, SOLUTION INTRAVENOUS at 11:50

## 2021-04-21 RX ADMIN — LIDOCAINE HYDROCHLORIDE 60 MG: 20 INJECTION, SOLUTION EPIDURAL; INFILTRATION; INTRACAUDAL; PERINEURAL at 11:45

## 2021-04-21 RX ADMIN — PROPOFOL 50 MG: 10 INJECTION, EMULSION INTRAVENOUS at 12:32

## 2021-04-21 RX ADMIN — MIDAZOLAM HYDROCHLORIDE 4 MG: 1 INJECTION, SOLUTION INTRAMUSCULAR; INTRAVENOUS at 11:01

## 2021-04-21 RX ADMIN — DEXAMETHASONE SODIUM PHOSPHATE 4 MG: 4 INJECTION, SOLUTION INTRAMUSCULAR; INTRAVENOUS at 12:10

## 2021-04-21 RX ADMIN — PROPOFOL 50 MG: 10 INJECTION, EMULSION INTRAVENOUS at 11:49

## 2021-04-21 RX ADMIN — SUCCINYLCHOLINE CHLORIDE 140 MG: 20 INJECTION, SOLUTION INTRAMUSCULAR; INTRAVENOUS at 11:47

## 2021-04-21 RX ADMIN — ROPIVACAINE HYDROCHLORIDE 40 ML: 5 INJECTION, SOLUTION EPIDURAL; INFILTRATION; PERINEURAL at 13:42

## 2021-04-21 RX ADMIN — FENTANYL CITRATE 25 MCG: 50 INJECTION, SOLUTION INTRAMUSCULAR; INTRAVENOUS at 11:35

## 2021-04-21 RX ADMIN — SODIUM CHLORIDE, POTASSIUM CHLORIDE, SODIUM LACTATE AND CALCIUM CHLORIDE 25 ML/HR: 600; 310; 30; 20 INJECTION, SOLUTION INTRAVENOUS at 10:50

## 2021-04-21 RX ADMIN — ROPIVACAINE HYDROCHLORIDE 10 ML: 5 INJECTION, SOLUTION EPIDURAL; INFILTRATION; PERINEURAL at 11:18

## 2021-04-21 RX ADMIN — SUCCINYLCHOLINE CHLORIDE 40 MG: 20 INJECTION, SOLUTION INTRAMUSCULAR; INTRAVENOUS at 11:49

## 2021-04-21 RX ADMIN — ROCURONIUM BROMIDE 5 MG: 10 INJECTION INTRAVENOUS at 11:45

## 2021-04-21 RX ADMIN — FENTANYL CITRATE 25 MCG: 50 INJECTION, SOLUTION INTRAMUSCULAR; INTRAVENOUS at 11:45

## 2021-04-21 RX ADMIN — Medication 3 AMPULE: at 10:51

## 2021-04-21 RX ADMIN — FENTANYL CITRATE 25 MCG: 50 INJECTION, SOLUTION INTRAMUSCULAR; INTRAVENOUS at 12:32

## 2021-04-21 NOTE — PERIOP NOTES
Dr. Sima Dickinson performed nerve block in preop using ultrasound machine to RLE. Pt on CM x3, 02 by NC at 3L, patient responsive when spoken to. Able to answer questions appropriately. Pt did receive Versed 4 mg IV and Fentanyl 75 mcg IV given by Dr. Sima Dickinson for sedation. Pt tolerated procedure well. VSS and will continue to monitor.

## 2021-04-21 NOTE — ANESTHESIA PREPROCEDURE EVALUATION
Anesthetic History   No history of anesthetic complications            Review of Systems / Medical History  Patient summary reviewed, nursing notes reviewed and pertinent labs reviewed    Pulmonary  Within defined limits                 Neuro/Psych         Psychiatric history    Comments: anxiety Cardiovascular  Within defined limits                Exercise tolerance: >4 METS     GI/Hepatic/Renal     GERD: well controlled      Liver disease (fatty)     Endo/Other    Diabetes    Obesity and arthritis     Other Findings   Comments: Tenosynovitis right ankle           Physical Exam    Airway  Mallampati: III  TM Distance: 4 - 6 cm  Neck ROM: normal range of motion   Mouth opening: Normal     Cardiovascular    Rhythm: regular  Rate: normal         Dental  No notable dental hx       Pulmonary  Breath sounds clear to auscultation               Abdominal  GI exam deferred       Other Findings            Anesthetic Plan    ASA: 2  Anesthesia type: general and regional - femoral single shot and popliteal fossa block      Post-op pain plan if not by surgeon: peripheral nerve block single    Induction: Intravenous  Anesthetic plan and risks discussed with: Patient

## 2021-04-21 NOTE — ANESTHESIA PROCEDURE NOTES
Peripheral Block    Start time: 4/21/2021 10:58 AM  End time: 4/21/2021 11:12 AM  Performed by: Giancarlo Lanier MD  Authorized by: Giancarlo Lanier MD       Pre-procedure:    Indications: at surgeon's request and post-op pain management    Preanesthetic Checklist: patient identified, risks and benefits discussed, site marked, timeout performed, anesthesia consent given and patient being monitored    Timeout Time: 11:00          Block Type:   Block Type:  Popliteal  Laterality:  Right  Monitoring:  Standard ASA monitoring, responsive to questions and oxygen  Injection Technique:  Single shot  Procedures: ultrasound guided and nerve stimulator    Patient Position: prone  Prep: alcohol    Location:  Mid thigh  Needle Type:  Stimuplex  Needle Gauge:  21 G  Needle Localization:  Ultrasound guidance and nerve stimulator  Medication Injected:  Ropivacaine (PF) (NAROPIN)(0.5%) 5 mg/mL injection, 40 mL    Assessment:  Number of attempts:  1  Injection Assessment:  Incremental injection every 5 mL, no paresthesia, ultrasound image on chart, local visualized surrounding nerve on ultrasound, negative aspiration for blood and no intravascular symptoms  Patient tolerance:  Patient tolerated the procedure well with no immediate complications

## 2021-04-21 NOTE — BRIEF OP NOTE
Brief Postoperative Note    Patient: Aria Royal  YOB: 1965  MRN: [id number]    Date of Procedure: 4/21/2021     Pre-Op Diagnosis: TENOSYNOVITIS/TENDON REPAIR    Post-Op Diagnosis: Same    Procedure(s):  CALCANEAL JOINT ARTHROSCOPY/ REPAIR PERONEAL TENDON WITH TOPAZ COBLATION RIGHT ANKLE (GEN/POP BLOCK)    Surgeon(s):  Terrance Cox DPM    Surgical Assistant: None    Anesthesia: General     Estimated Blood Loss (mL): 73OL    Complications: None    Specimens: None     Implants: None    Drains: None    Findings: Fusiform enlargement of peroneal tendon right foot    Electronically Signed by Valery Canales DPM on 4/21/2021 at 1:32 PM

## 2021-04-21 NOTE — ANESTHESIA PROCEDURE NOTES
Peripheral Block    Start time: 4/21/2021 11:15 AM  End time: 4/21/2021 11:20 AM  Performed by: Mitch Morejon MD  Authorized by: Mitch Morejon MD       Pre-procedure: Indications: at surgeon's request and post-op pain management    Preanesthetic Checklist: patient identified, risks and benefits discussed, site marked, timeout performed, anesthesia consent given and patient being monitored    Timeout Time: 11:00          Block Type:   Block Type:   Adductor canal  Laterality:  Right  Monitoring:  Standard ASA monitoring, responsive to questions and oxygen  Injection Technique:  Single shot  Procedures: ultrasound guided    Patient Position: supine  Prep: chlorhexidine    Location:  Mid thigh  Needle Type:  Stimuplex  Needle Gauge:  22 G  Needle Localization:  Ultrasound guidance  Medication Injected:  Ropivacaine (PF) (NAROPIN)(0.5%) 5 mg/mL injection, 10 mL  Med Admin Time: 4/21/2021 11:18 AM    Assessment:  Number of attempts:  1  Injection Assessment:  Incremental injection every 5 mL, no paresthesia, ultrasound image on chart, local visualized surrounding nerve on ultrasound, negative aspiration for blood and no intravascular symptoms  Patient tolerance:  Patient tolerated the procedure well with no immediate complications

## 2021-04-21 NOTE — ANESTHESIA POSTPROCEDURE EVALUATION
Procedure(s):  CALCANEAL JOINT ARTHROSCOPY/ REPAIR PERONEAL TENDON WITH TOPAZ COBLATION RIGHT ANKLE (GEN/POP BLOCK). general, regional    Anesthesia Post Evaluation      Multimodal analgesia: multimodal analgesia used between 6 hours prior to anesthesia start to PACU discharge  Patient location during evaluation: PACU  Patient participation: complete - patient participated  Level of consciousness: awake and alert  Pain score: 0  Airway patency: patent  Anesthetic complications: no  Cardiovascular status: acceptable  Respiratory status: acceptable  Hydration status: acceptable  Comments: Pt has popliteal & adductor canal nerve blocks. Non-weight bearing postop  Post anesthesia nausea and vomiting:  none  Final Post Anesthesia Temperature Assessment:  Normothermia (36.0-37.5 degrees C)      INITIAL Post-op Vital signs:   Vitals Value Taken Time   /66 04/21/21 1400   Temp 36.6 °C (97.9 °F) 04/21/21 1345   Pulse 77 04/21/21 1400   Resp 17 04/21/21 1400   SpO2 95 % 04/21/21 1400   Vitals shown include unvalidated device data.

## 2021-04-21 NOTE — PERIOP NOTES
80 Pt arrived to PACU. Pt arrousable. Dressing on right foot CDI. Right toes warm and pink. Pt wiggling right toes. Blood sugar 99.    1345 Pt drinking apple juice. 1415 Pt alert and oriented. VSS. Denies pain. Dressing CDI. Right toes warm and pink. Pt meets discharge criteria. Pt has own crutches and knee scooter at home.

## 2021-04-21 NOTE — DISCHARGE INSTRUCTIONS
-Medications as prescribed  -No walking, weight on right foot  -Elevate above the heart level, ice behind the knee    Dr. John López: [address] THROMBOSIS AND PULMONARY EMBOLUS    SURGICAL PATIENTS  Surgical patients are the #1 risk for DVT and PE. WHAT IS DVT? WHAT IS PE?  DVT is a serious condition where blood clots develop deep in the veins of the legs. PE occurs when a blood clot breaks loose from the wall of a vein and travels to the lungs blocking the pulmonary artery or one of its branches impairing blood flow from the heart, which could result in death.   RISK FACTORS   Surgery lasting longer than 45 minutes   History of inflammatory bowel disease   Oral contraceptive or hormone replacement therapy   Immobilization   Varicose veins / swollen legs   Smoking    CHF / Acute MI / Irregular heart beat   Family history of thrombosis   General anesthesia greater than 2 hours   Obesity   Infection of less than one month   Less than 1 month postpartum   COPD / Pneumonia   Arthroscopic surgery   Malignancy / cancer   Spine surgery   Blood abnormalities   Stroke / Paralysis / Coma    SIGNS AND SYMPTOMS OF DEEP VEIN TROMBOSIS   -  ER VISIT  Usually occurs in one leg, above or below the knee   Swelling - one calf or thigh may be larger than the other   Feeling increased warmth in the area of the leg that is swollen or painful   Leg pain, which may increase when standing or walking   Swelling along the vein of the leg   When swollen areas is pressed with a finger, a depression may remain   Tenderness of the leg that may be confined to one area   Change in leg color (bluish or red)    SIGNS AND SYMPTOMS OF PULMONARY EMBOLUS  - 911 CALL   Chest pain that gets worse with deep breath, coughing or chest movement   Coughing up blood   Sweating   Shortness of breath or difficulty breathing   Rapid heart beat   Lightheadedness    HOW TO REDUCE THE POSSIBLE RISK OF DVT   Exercise - simple activities as rotating ankles and wrists, wiggling toes and fingers, tightening and relaxing muscles in calves and thighs promotes circulation while recovering from surgery. Please do these exercises every hour during waking hours   BASIL hose - If you have been given white support hose while having surgery, wear them home. You may remove them for half an hour every 8-hour period and stop wearing them 48 hours after surgery or as prescribed by your physician. BASIL hose may be reused for air travel or extended car travel   Take mediation as prescribed by your physician (Lovenox, Coumadin, Aspirin)   Resume your normal activities as soon as your doctor advises you to do so.  Remember, when traveling, to [place] your legs frequently. Wear non skid shoes when BASIL hose are on. They are very slippery! PATIENTS WHO BELIEVE THEY MAY BE EXPERIENCING SIGNS AND SYMPTOMS OF DVT OR PE SHOULD SEEK MEDICAL HELP IMMEDIATELY        DO NOT TAKE TYLENOL/ACETAMINOPHEN WITH PERCOCET, [address], [address] OR [place]. TAKE NARCOTIC PAIN MEDICATIONS WITH FOOD! For the night of surgery, while block is still in effect, start with 1 pain pill at bedtime    Narcotics tend to be constipating and we recommend taking a stool softener such as Colace or Miralax (follow package instructions). If you were given prescriptions, please review the written information on the prescribed medications. DO NOT DRIVE WHILE TAKING NARCOTIC PAIN MEDICATIONS. CPAP PATIENTS BE SURE TO WEAR MACHINE WHENEVER NAPPING OR SLEEPING DAY/NIGHT OF SURGERY! DISCHARGE SUMMARY from Nurse    The following personal items collected during your admission are returned to you:   Dental Appliance: Dental Appliances: None  Vision: Visual Aid: Glasses, At home  Hearing Aid:    Jewelry: Jewelry: None  Clothing: Clothing: With patient  Other Valuables:  Other Valuables: None  Valuables sent to safe:        PATIENT INSTRUCTIONS:    After General Anesthesia or Intravenous Sedation, for 24 hours or while taking prescription Narcotics:  · Someone should be with you for the next 24 hours. · For your own safety, a responsible adult must drive you home. · Limit your activities  · Recommended activity: Rest today, up with assistance today. Do not climb stairs or shower unattended for the next 24 hours. · Start with a soft bland diet and advance as tolerated (no nausea) to regular diet. · If you have a sore throat some things that may help are: fluids, warm salt water gargle, or throat lozenges. If this does not improve after several days please follow up with your family physician. · Do not drive and operate hazardous machinery  · Do not make important personal or business decisions  · Do  not drink alcoholic beverages  · If you have not urinated within 8 hours after discharge, please contact your surgeon on call. Report the following to your surgeon:  · Excessive pain, swelling, redness or odor of or around the surgical area  · Temperature over 100.5  · Nausea and vomiting lasting longer than 4 hours or if unable to take medications  · Any signs of decreased circulation or nerve impairment to extremity: change in color, persistent  numbness, tingling, coldness or increase pain      · If you received a lower extremity nerve block, please use your crutches, walker, or cane until the nerve block has worn off, then refer to your surgeons post-operative instructions.    · You will receive a Post Operative Call from one of the Recovery Room Nurses on the day after your surgery to check on you. It is very important for us to know how you are recovering after your surgery. If you have an issue please call your surgeon, do not wait for the post operative call. · You may receive an e-mail or letter in the mail from [employer] regarding your experience with us in the Ambulatory Surgery Unit.  Your feedback is valuable to us and we appreciate your participation in the survey. ·   · If the above instructions are not adequate or you are having problems after your surgery, call your physician at their office number. ·   · We wish youre a speedy recovery ? What to do at Home:    *  Please give a list of your current medications to your Primary Care Provider. *  Please update this list whenever your medications are discontinued, doses are      changed, or new medications (including over-the-counter products) are added. *  Please carry medication information at all times in case of emergency situations. If you have not had your influenza or pneumococcal vaccines, please follow up with your primary care physician. The discharge information has been reviewed with the patient and caregiver. The patient and caregiver verbalized understanding.

## 2021-04-21 NOTE — PERIOP NOTES
Permission received to review discharge instructions and discuss private health information with Randy Joshi, .

## 2021-04-21 NOTE — PERIOP NOTES
Robbin Cho  1965  [id number]    Situation:  Verbal report given from: Malcolm Ramsay CRNA  Procedure: Procedure(s):  CALCANEAL JOINT ARTHROSCOPY/ REPAIR PERONEAL TENDON WITH TOPAZ COBLATION RIGHT ANKLE (GEN/POP BLOCK)    Background:    Preoperative diagnosis: TENOSYNOVITIS/TENDON REPAIR    Postoperative diagnosis: TENOSYNOVITIS/TENDON REPAIR    :  Dr. Segundo Gandhi    Assistant(s): Circ-1: Sarah Thayer RN  Circ-Relief: Rosario ESPINO  Scrub Tech-1: Onelia Saunders  Scrub Tech-Relief: Rosana Sánchez    Specimens: * No specimens in log *    Assessment:  Intra-procedure medications         Anesthesia gave intra-procedure sedation and medications, see anesthesia flow sheet     Intravenous fluids: LR@ KVO     Vital signs stable       Recommendation:    Permission to share finding with family : yes

## 2021-04-22 NOTE — OP NOTES
[address]  OPERATIVE REPORT    Name:  Andreia Baker  MR#:  [id number]  :  1965  ACCOUNT #:  [de-identified]  DATE OF SERVICE:  2021    PREOPERATIVE DIAGNOSES:  1. Impingement lesion with hemorrhagic tenosynovitis, calcaneal subtalar joint right foot. 2.  Severe fusiform enlargement and derangement of peroneal tendons, right foot. POSTOPERATIVE DIAGNOSES:  1. Impingement lesion with hemorrhagic tenosynovitis, calcaneal subtalar joint right foot. 2.  Severe fusiform enlargement and derangement of peroneal tendons, right foot. PROCEDURE PERFORMED:  1. Extensive arthroscopic debridement, calcaneal subtalar joint right foot. 2.  Retubularization with tenodesis of the peroneal tendons, lateral right foot. SURGEON:  Zhou Brown MD    ASSISTANT:  None. ANESTHESIA:  General, popliteal block placed by Anesthesia perioperatively, right side. COMPLICATIONS:  None apparent. SPECIMENS REMOVED:  Necrotic tendon capsule and tendon tissue from peroneus brevis midsubstance, right foot. IMPLANTS:  None. ESTIMATED BLOOD LOSS:  Approximately 15 mL. INJECTABLES:  10 mL of 0.5% Marcaine plain injected postoperatively, right foot. PROCEDURE:  The patient was brought into the operating room and placed on the operating table in the supine position after popliteal block was placed and general anesthesia. Once achieved, we placed her in slight lateral decubitus position to allow the sinus tarsi to be facing directly dorsally right side. Right foot and ankle were then prepped and draped around a well-padded mid calf tourniquet and the foot was exsanguinated after prepping and draping, and the tourniquet was inflated on the right side and used throughout the procedure. Access was gained initially along the anterolateral portal of the subtalar joint after palpation.   Small 4 mm stab incision was placed with blunt dissection carried out with hemostat with care being taken to avoid the neurovascular structures. Access was gained bluntly to the capsule, which then the hemostat was removed and a blunt obturator was placed in order to gain entry into the capsule of subtalar joint portal.  The 4.0-mm arthroscopic camera was then placed and fluid flow was started. The joint was seen at this point. The perimeter of the subtalar joint as well as the dorsal calcaneus and the sinus tarsi and the interosseous ligament, which showed two impingement lesions and an abundant amount of hemorrhagic synovitis in this region. An anterolateral portal was then placed with an 18-gauge needle first to identify and then the 11-blade stab incision followed by blunt dissection with hemostat and application of a blunt probe initially. With probe, I was able to examine the interosseous ligament, and the dorsal subtalar joint and calcaneal portion of the subtalar joints appeared to be intact with some slight arthrosis on the perimeter noted. Posterior capsule of the joint was noted to be intact. At this point, the coblation unit/wand was placed in this area with suction and fluid flow, and the tissue in the perimeter was debrided, and one of the impingement lesions was able to be removed through coblation. Once this was completed with care being taken not to heat up the joint, this was removed and a sucker shaver device was placed in order to debride this area thoroughly especially along the interosseous ligament of all impingement lesions. This was carried out, followed by switching of the portals of the camera and the instrumentation and one more pass of the coblation unit. The impingement lesion was noted to have been removed and interosseous ligament was shown to be improved with known impingement lesions on full range of motion and inversion of this subtalar joint at this time frame.   Medial aspect of the subtalar joint was also identified and an impingement lesion was seen medially on this area also, this was removed with an alligator forceps. The Coblator unit was placed in one more time to help with ablation of hemorrhagic synovitis at this time frame. The objectives of this procedure were believed to have been met and the instruments were removed and the portal holes were reapproximated and coapted with 4-0 nylon. Access was then gained down to the course of the peroneal tendons from just proximal to the tip of the lateral malleolus down approximately 1 cm distal to the dorsal aspect of the fifth metatarsal base. Care was taken to avoid the neurovascular structures. Further dissection was carried out with Metzenbaum scissors, passed the subcutaneous tissues with easy identification of the sural nerve. This was protected with gentle retraction. The capsule was shown to be hypertrophied centrally. This was incised bluntly with Metzenbaum scissors exposing the peroneal tendons in the surgical site. Thickened capsule was excised and removed. The peroneus brevis at the region of the posterior lateral malleolus and just distal to this approximately 0.5 cm was shown to be fusiform in enlargement, but intact. Peroneus longus plantarly was shown proximally to have a flattening and a longitudinal split also at the bend just distal to the lateral malleolus. These were examined as well as the capsule in the intertendinous septum. The peroneal tubercle was shown to be smooth and not impinging on the lesions. Any thickness along the tendon and inferior capsule was debrided also with rongeur at this time frame. Thickened tendon was now excised to good viable tendon. At this point, the area was copiously irrigated with sterile saline solution.   At this point, the Topaz coblation unit was utilized with a saline drip and the peroneus brevis and peroneus longus tendons had the [employer] placed in different angles and different depths along its course from just posterior to the lateral malleolus down to the end of the incision distally. Once this was completed, the tendons were then retubularized and anastomosed in tenodesis fashion with a running locking stitch interwoven, utilizing 2-0 Ethibond. Once this was completed, the tendons were shown to move as one unit smoothly inside the capsule sheath and trough. The area was again copiously irrigated with sterile saline solution. The capsule was now reapproximated and repaired with 2-0 Vicryl. The tendons were again assessed and found to be smooth within the capsule. The subcutaneous tissues were now reapproximated and coapted with care being taken to avoid the sural nerve plantar aspect of the incision. The skin was now reapproximated and coapted in a simple interrupted fashion using 4-0 nylon. Tourniquet was now deflated showing immediate hyperemic flush digits 1 through 5 on the right side. Postoperative block in the surgical area using 0.5% Marcaine with epinephrine was carried out. Xeroform was placed followed by 4 x 4, 3-inch Kerlix. This was followed by stockinette with well-padded 5-inch x 30-inch posterior splint application foot and ankle in the lower leg. The patient tolerated the above procedures and anesthesia well. She was discharged from the operating room with vital signs stable and vascular status intact to the right foot and ankle. Prognosis for this patient was satisfactory.         Frankie Evangelista MD      [id number]  D:  04/21/2021 14:30  T:  04/22/2021 0:36  JOB #:  [id number]

## 2021-09-08 ENCOUNTER — OFFICE VISIT (OUTPATIENT)
Dept: INTERNAL MEDICINE CLINIC | Age: 56
End: 2021-09-08
Payer: COMMERCIAL

## 2021-09-08 VITALS
HEART RATE: 88 BPM | RESPIRATION RATE: 16 BRPM | HEIGHT: 64 IN | TEMPERATURE: 97 F | WEIGHT: 208 LBS | DIASTOLIC BLOOD PRESSURE: 60 MMHG | SYSTOLIC BLOOD PRESSURE: 110 MMHG | OXYGEN SATURATION: 97 % | BODY MASS INDEX: 35.51 KG/M2

## 2021-09-08 DIAGNOSIS — E78.00 PURE HYPERCHOLESTEROLEMIA: ICD-10-CM

## 2021-09-08 DIAGNOSIS — E66.01 SEVERE OBESITY (HCC): ICD-10-CM

## 2021-09-08 DIAGNOSIS — K76.0 HEPATIC STEATOSIS: ICD-10-CM

## 2021-09-08 DIAGNOSIS — E11.9 TYPE 2 DIABETES MELLITUS WITHOUT COMPLICATION, WITHOUT LONG-TERM CURRENT USE OF INSULIN (HCC): Primary | ICD-10-CM

## 2021-09-08 DIAGNOSIS — F41.9 ANXIETY: ICD-10-CM

## 2021-09-08 LAB
ALBUMIN SERPL-MCNC: 4.3 G/DL (ref 3.5–5)
ALBUMIN/GLOB SERPL: 1.3 {RATIO} (ref 1.1–2.2)
ALP SERPL-CCNC: 118 U/L (ref 45–117)
ALT SERPL-CCNC: 82 U/L (ref 12–78)
ANION GAP SERPL CALC-SCNC: 7 MMOL/L (ref 5–15)
AST SERPL-CCNC: 61 U/L (ref 15–37)
BILIRUB SERPL-MCNC: 0.4 MG/DL (ref 0.2–1)
BUN SERPL-MCNC: 19 MG/DL (ref 6–20)
BUN/CREAT SERPL: 27 (ref 12–20)
CALCIUM SERPL-MCNC: 9.6 MG/DL (ref 8.5–10.1)
CHLORIDE SERPL-SCNC: 105 MMOL/L (ref 97–108)
CHOLEST SERPL-MCNC: 184 MG/DL
CO2 SERPL-SCNC: 28 MMOL/L (ref 21–32)
CREAT SERPL-MCNC: 0.7 MG/DL (ref 0.55–1.02)
EST. AVERAGE GLUCOSE BLD GHB EST-MCNC: 134 MG/DL
GLOBULIN SER CALC-MCNC: 3.4 G/DL (ref 2–4)
GLUCOSE SERPL-MCNC: 94 MG/DL (ref 65–100)
HBA1C MFR BLD: 6.3 % (ref 4–5.6)
HDLC SERPL-MCNC: 54 MG/DL
HDLC SERPL: 3.4 {RATIO} (ref 0–5)
LDLC SERPL CALC-MCNC: 96.8 MG/DL (ref 0–100)
POTASSIUM SERPL-SCNC: 4.7 MMOL/L (ref 3.5–5.1)
PROT SERPL-MCNC: 7.7 G/DL (ref 6.4–8.2)
SODIUM SERPL-SCNC: 140 MMOL/L (ref 136–145)
TRIGL SERPL-MCNC: 166 MG/DL (ref ?–150)
VLDLC SERPL CALC-MCNC: 33.2 MG/DL

## 2021-09-08 PROCEDURE — 99213 OFFICE O/P EST LOW 20 MIN: CPT | Performed by: INTERNAL MEDICINE

## 2021-09-08 NOTE — PATIENT INSTRUCTIONS
Office Policies    Phone calls/patient messages:            Please allow up to 24 hours for someone in the office to contact you about your call or message. Be mindful your provider may be out of the office or your message may require further review. We encourage you to use [employer] for your messages as this is a faster, more efficient way to communicate with our office                         Medication Refills:            Prescription medications require 48-72 business hours to process. We encourage you to use [employer] for your refills. For controlled medications: Please allow 72 business hours to process. Certain medications may require you to  a written prescription at our office. NO narcotic/controlled medications will be prescribed after 4pm Monday through Friday or on weekends              Form/Paperwork Completion:            Please note a $25 fee may incur for all paperwork for completed by our providers. We ask that you allow 7-10 business days. Pre-payment is due prior to picking up/faxing the completed form. You may also download your forms to [employer] to have your doctor print off.

## 2021-09-08 NOTE — PROGRESS NOTES
HISTORY OF PRESENT ILLNESS  Cm Dos Santos is a 60 y.o. female. HPI        F/u anxiety, DM-2 ,HLD  hepatic steatosis obesity anxiety , fibromyalgia-Dr Ayala Cervantes  Last a1c 6.2 LDL 89  fsbs around 100-110  No cp sob or symptoms ofneuropathy  Right ankle tendin surgery in April this year--recovered well-Dr Negra Bray  Stable Fibromyalgia symtpoms-has good and bad days on flexeril and neurontin and lexapro  Anxiety and insomnia controlled on lexapro  stable weight     Last OV  Last a1c 6.1 LDL 91  fsbs <130 . Some around 70  bp wnl at home  No cp sob or symptoms of neuropathy  Anxiety symptoms controlled most of the time on lexapro . Takes buspar prn only     Was seen at [facility] ER in [place] [address] for wheezes and hoarseness--dx CAP vs bronchitsi, covid negative. Still using inhalers for wheezes but improving. Not sob ,nonsmoker  Sees Dr Ayala Cervantes for [facility] today . `    Patient Active Problem List   Diagnosis Code    Fibromyalgia M79.7    Insomnia G47.00    DJD (degenerative joint disease) of knee M17.10    Prediabetes R73.03    Ischemic colitis ([address] K55.9    HLD (hyperlipidemia) E78.5    Dislocation of prosthetic joint ([facility] T84.029A    Hepatic steatosis K76.0    Palpitation R00.2    FH: colon cancer Z80.0    Severe obesity (BMI 35.0-39. 9) with comorbidity (HCC) E66.01    Pure hypercholesterolemia E78.00    Anxiety F41.9     Current Outpatient Medications   Medication Sig Dispense Refill    atorvastatin (LIPITOR) 10 mg tablet Take 1 Tab by mouth daily. Indications: high cholesterol and high triglycerides 90 Tab 3    cyclobenzaprine (Flexeril) 10 mg tablet Take 1 Tab by mouth two (2) times a day. 180 Tab 1    escitalopram oxalate (LEXAPRO) 20 mg tablet Take 1 Tab by mouth daily. 90 Tab 3    metFORMIN (GLUCOPHAGE) 500 mg tablet Take 2 Tabs by mouth two (2) times daily (with meals).  360 Tab 3    omeprazole (PRILOSEC) 20 mg capsule TAKE 1 CAPSULE DAILY 90 Cap 3    triamterene-hydroCHLOROthiazide (MAXZIDE) 37.5-25 mg per tablet Take 1 Tab by mouth daily. 90 Tab 3    zolpidem (AMBIEN) 10 mg tablet TAKE 1 TABLET BY MOUTH AT BEDTIME 30 Tab 1    busPIRone (BUSPAR) 5 mg tablet TAKE 1 TABLET BY MOUTH TWICE A DAY (Patient taking differently: Take 5 mg by mouth two (2) times daily as needed.) 180 Tab 3    multivitamin (ONE A DAY) tablet Take 1 Tab by mouth daily.  cyanocobalamin (VITAMIN B-12) 1,000 mcg tablet Take 1,000 mcg by mouth daily.  EPINEPHrine (EPIPEN) 0.3 mg/0.3 mL injection 0.3 mL by IntraMUSCular route once as needed for 1 dose. 1 Each 1    gabapentin (NEURONTIN) 100 mg tablet Take 300 mg by mouth two (2) times a day. Allergies   Allergen Reactions    Latex Contact Dermatitis     Welts, redness, \"oozing\" per pt.     Strawberry Anaphylaxis    Demerol [Meperidine] Anaphylaxis    Hydromorphone Other (comments)     Nausea and hypotension    Lyrica [Pregabalin] Swelling     Feet and ankles and hands swelling    Morphine Other (comments)     Hypotension      Pcn [Penicillins] Hives      Lab Results   Component Value Date/Time    WBC 6.9 03/04/2021 09:24 AM    HGB 13.1 03/04/2021 09:24 AM    HCT 40.2 03/04/2021 09:24 AM    Hematocrit (POC) 41 07/27/2018 06:55 AM    PLATELET 130 92/62/9231 09:24 AM    MCV 89 03/04/2021 09:24 AM     Lab Results   Component Value Date/Time    Hemoglobin A1c 6.2 (H) 03/04/2021 09:24 AM    Hemoglobin A1c 6.1 (H) 09/29/2020 08:28 AM    Hemoglobin A1c 6.2 (H) 03/04/2020 08:59 AM    Glucose 88 03/04/2021 09:24 AM    Glucose (POC) 92 04/21/2021 01:49 PM    Microalb/Creat ratio (ug/mg creat.) 7 03/04/2021 09:24 AM    LDL, calculated 89 03/04/2021 09:24 AM    LDL, calculated 79 03/04/2020 08:59 AM    Creatinine (POC) 0.8 07/27/2018 06:55 AM    Creatinine 0.73 03/04/2021 09:24 AM      Lab Results   Component Value Date/Time    Cholesterol, total 170 03/04/2021 09:24 AM    HDL Cholesterol 53 03/04/2021 09:24 AM    LDL, calculated 89 03/04/2021 09:24 AM    LDL, calculated 79 03/04/2020 08:59 AM    Triglyceride 166 (H) 03/04/2021 09:24 AM     Lab Results   Component Value Date/Time    GFR est non-AA 92 03/04/2021 09:24 AM    GFRNA, POC >60 07/27/2018 06:55 AM    GFR est  03/04/2021 09:24 AM    GFRAA, POC >60 07/27/2018 06:55 AM    Creatinine 0.73 03/04/2021 09:24 AM    Creatinine (POC) 0.8 07/27/2018 06:55 AM    BUN 23 03/04/2021 09:24 AM    BUN (POC) 24 (H) 07/27/2018 06:55 AM    Sodium 142 03/04/2021 09:24 AM    Sodium (POC) 141 07/27/2018 06:55 AM    Potassium 4.1 03/04/2021 09:24 AM    Potassium (POC) 3.4 (L) 07/27/2018 06:55 AM    Chloride 99 03/04/2021 09:24 AM    Chloride (POC) 98 07/27/2018 06:55 AM    CO2 29 03/04/2021 09:24 AM        ROS    Physical Exam  Vitals and nursing note reviewed. Constitutional:       Appearance: Normal appearance. She is well-developed. She is obese. Comments: Appears stated age   Cardiovascular:      Rate and Rhythm: Normal rate and regular rhythm. Heart sounds: Normal heart sounds. No murmur heard. No friction rub. No gallop. Pulmonary:      Effort: Pulmonary effort is normal. No respiratory distress. Breath sounds: Normal breath sounds. No wheezing. Abdominal:      General: Bowel sounds are normal.      Palpations: Abdomen is soft. Neurological:      Mental Status: She is alert. ASSESSMENT and PLAN  Diagnoses and all orders for this visit:    1. Type 2 diabetes mellitus without complication, without long-term current use of insulin (HCC)  -     HEMOGLOBIN A1C WITH EAG; Future  -     METABOLIC PANEL, COMPREHENSIVE; Future   Will get eye exam    Will get foot exam with Dr Vicenta Sanchez control by FSBS  2. Pure hypercholesterolemia  -     METABOLIC PANEL, COMPREHENSIVE; Future  -     LIPID PANEL; Future    3. Anxiety   Controlled on ssri and buspar  4. Hepatic steatosis  -     METABOLIC PANEL, COMPREHENSIVE; Future    5.  Severe obesity ([address]   willl stay on low calorie diuet    Follow-up and Dispositions    · Return in about 6 months (around 3/8/2022) for dm-2 anxiety fibro HLD hepatioc steatosis--labs.

## 2021-09-11 DIAGNOSIS — K76.0 FATTY LIVER: Primary | ICD-10-CM

## 2021-09-12 NOTE — PROGRESS NOTES
Tell pt cholesterol levels are controlled on lipitro  Diabetes is controlled 3 mos bs bdx=235  Normal kidney and lytes  LFT up slightly more--as fatty livet by US--weight reduction recommended but also refer to Virgilio Maddox for fatty liver.

## 2021-09-13 NOTE — PROGRESS NOTES
Spoke with patient using 2 identifiers. Patient was informed of recent labs and Dr. Hiram Bermudez recommendations. Patient will need to follow up Dr. Estela Sandoval patient was given his office phone number to follow up about appointment.

## 2021-10-05 ENCOUNTER — TELEPHONE (OUTPATIENT)
Dept: INTERNAL MEDICINE CLINIC | Age: 56
End: 2021-10-05

## 2021-10-05 NOTE — TELEPHONE ENCOUNTER
Patient is requesting notes to be sent to   [address] [facility] [address]  Phone: [phone]  Fax: [phone]  Her appointment is scheduled in January 2022.  Thanks

## 2021-11-30 RX ORDER — BUSPIRONE HYDROCHLORIDE 5 MG/1
TABLET ORAL
Qty: 180 TABLET | Refills: 3 | Status: SHIPPED | OUTPATIENT
Start: 2021-11-30 | End: 2022-08-09

## 2021-12-27 RX ORDER — DOXYCYCLINE 100 MG/1
100 TABLET ORAL 2 TIMES DAILY
Qty: 20 TABLET | Refills: 0 | Status: SHIPPED | OUTPATIENT
Start: 2021-12-27 | End: 2022-03-14

## 2022-02-01 DIAGNOSIS — E11.9 CONTROLLED TYPE 2 DIABETES MELLITUS WITHOUT COMPLICATION, WITH LONG-TERM CURRENT USE OF INSULIN (HCC): ICD-10-CM

## 2022-02-01 DIAGNOSIS — Z79.4 CONTROLLED TYPE 2 DIABETES MELLITUS WITHOUT COMPLICATION, WITH LONG-TERM CURRENT USE OF INSULIN (HCC): ICD-10-CM

## 2022-02-01 RX ORDER — ESCITALOPRAM OXALATE 20 MG/1
TABLET ORAL
Qty: 90 TABLET | Refills: 3 | Status: SHIPPED | OUTPATIENT
Start: 2022-02-01

## 2022-02-01 RX ORDER — ATORVASTATIN CALCIUM 10 MG/1
10 TABLET, FILM COATED ORAL DAILY
Qty: 90 TABLET | Refills: 3 | Status: SHIPPED | OUTPATIENT
Start: 2022-02-01

## 2022-02-01 RX ORDER — METFORMIN HYDROCHLORIDE 500 MG/1
1000 TABLET ORAL 2 TIMES DAILY WITH MEALS
Qty: 360 TABLET | Refills: 3 | Status: SHIPPED | OUTPATIENT
Start: 2022-02-01

## 2022-03-07 RX ORDER — OMEPRAZOLE 20 MG/1
CAPSULE, DELAYED RELEASE ORAL
Qty: 90 CAPSULE | Refills: 3 | Status: SHIPPED | OUTPATIENT
Start: 2022-03-07 | End: 2022-09-15 | Stop reason: ALTCHOICE

## 2022-03-12 NOTE — PROGRESS NOTES
HISTORY OF PRESENT ILLNESS  Jennifer Horner is a 60 y.o. female. HPI   F/u anxiety, DM-2 ,HLD  hepatic steatosis obesity  , fibromyalgia-Dr Marlena Urias  Last a1c 6.3 LDL 96  C/o sorethroat and gland swelling x 2 weeks--  No f/c   Intermittent cough  has initial appt at  liver inst in April for fatty liver --lost 7 lbs with diet since last OV  lexapro helps with her anxiety-rare use of buspar  fsbs around 90s  Last OV       Last a1c 6.2 LDL 89  fsbs around 100-110  No cp sob or symptoms ofneuropathy  Right ankle tendin surgery in April this year--recovered well-Dr Andrea Gonzalez  Stable Fibromyalgia symtpoms-has good and bad days on flexeril and neurontin and lexapro  Anxiety and insomnia controlled on lexapro  stable weight     Patient Active Problem List    Diagnosis Date Noted    Anxiety 01/27/2020    Pure hypercholesterolemia 07/30/2019    Severe obesity (BMI 35.0-39. 9) with comorbidity ([facility] [address] 04/12/2018    FH: colon cancer 11/01/2017    Palpitation 02/17/2017    Hepatic steatosis 06/23/2016    Dislocation of prosthetic joint ([facility] 07/25/2014    HLD (hyperlipidemia) 07/11/2014    Prediabetes 09/03/2013    Ischemic colitis ([facility] 09/03/2013    DJD (degenerative joint disease) of knee 06/04/2013    Fibromyalgia 09/30/2011    Insomnia 09/30/2011     Current Outpatient Medications   Medication Sig Dispense Refill    omeprazole (PRILOSEC) 20 mg capsule TAKE 1 CAPSULE BY MOUTH EVERY DAY 90 Capsule 3    escitalopram oxalate (LEXAPRO) 20 mg tablet TAKE 1 TABLET BY MOUTH EVERY DAY 90 Tablet 3    metFORMIN (GLUCOPHAGE) 500 mg tablet TAKE 2 TABS BY MOUTH TWO (2) TIMES DAILY (WITH MEALS). 360 Tablet 3    atorvastatin (LIPITOR) 10 mg tablet TAKE 1 TAB BY MOUTH DAILY. INDICATIONS: HIGH CHOLESTEROL AND HIGH TRIGLYCERIDES 90 Tablet 3    busPIRone (BUSPAR) 5 mg tablet TAKE 1 TABLET BY MOUTH TWICE A  Tablet 3    multivitamin (ONE A DAY) tablet Take 1 Tab by mouth daily.       cyanocobalamin (VITAMIN B-12) 1,000 mcg tablet Take 1,000 mcg by mouth daily.  gabapentin (NEURONTIN) 100 mg tablet Take 300 mg by mouth two (2) times a day.  zolpidem (AMBIEN) 10 mg tablet TAKE 1 TABLET BY MOUTH AT BEDTIME 30 Tablet 1    triamterene-hydroCHLOROthiazide (MAXZIDE) 37.5-25 mg per tablet Take 1 Tablet by mouth daily. 90 Tablet 3    cyclobenzaprine (Flexeril) 10 mg tablet Take 1 Tab by mouth two (2) times a day. 180 Tab 1    EPINEPHrine (EPIPEN) 0.3 mg/0.3 mL injection 0.3 mL by IntraMUSCular route once as needed for 1 dose. 1 Each 1     Allergies   Allergen Reactions    Latex Contact Dermatitis     Welts, redness, \"oozing\" per pt.     Strawberry Anaphylaxis    Demerol [Meperidine] Anaphylaxis    Hydromorphone Other (comments)     Nausea and hypotension    Lyrica [Pregabalin] Swelling     Feet and ankles and hands swelling    Morphine Other (comments)     Hypotension      Pcn [Penicillins] Hives      Lab Results   Component Value Date/Time    WBC 6.9 03/04/2021 09:24 AM    HGB 13.1 03/04/2021 09:24 AM    HCT 40.2 03/04/2021 09:24 AM    Hematocrit (POC) 41 07/27/2018 06:55 AM    PLATELET 756 89/73/6289 09:24 AM    MCV 89 03/04/2021 09:24 AM     Lab Results   Component Value Date/Time    Hemoglobin A1c 6.3 (H) 09/08/2021 10:08 AM    Hemoglobin A1c 6.2 (H) 03/04/2021 09:24 AM    Hemoglobin A1c 6.1 (H) 09/29/2020 08:28 AM    Glucose 94 09/08/2021 10:08 AM    Glucose (POC) 92 04/21/2021 01:49 PM    Microalb/Creat ratio (ug/mg creat.) 7 03/04/2021 09:24 AM    LDL, calculated 96.8 09/08/2021 10:08 AM    Creatinine (POC) 0.8 07/27/2018 06:55 AM    Creatinine 0.70 09/08/2021 10:08 AM      Lab Results   Component Value Date/Time    Cholesterol, total 184 09/08/2021 10:08 AM    HDL Cholesterol 54 09/08/2021 10:08 AM    LDL, calculated 96.8 09/08/2021 10:08 AM    Triglyceride 166 (H) 09/08/2021 10:08 AM    CHOL/HDL Ratio 3.4 09/08/2021 10:08 AM     Lab Results   Component Value Date/Time    GFR est non-AA >60 09/08/2021 10:08 AM GFRNA, POC >60 07/27/2018 06:55 AM    GFR est AA >60 09/08/2021 10:08 AM    GFRAA, POC >60 07/27/2018 06:55 AM    Creatinine 0.70 09/08/2021 10:08 AM    Creatinine (POC) 0.8 07/27/2018 06:55 AM    BUN 19 09/08/2021 10:08 AM    BUN (POC) 24 (H) 07/27/2018 06:55 AM    Sodium 140 09/08/2021 10:08 AM    Sodium (POC) 141 07/27/2018 06:55 AM    Potassium 4.7 09/08/2021 10:08 AM    Potassium (POC) 3.4 (L) 07/27/2018 06:55 AM    Chloride 105 09/08/2021 10:08 AM    Chloride (POC) 98 07/27/2018 06:55 AM    CO2 28 09/08/2021 10:08 AM        ROS    Physical Exam  Vitals and nursing note reviewed. Constitutional:       Appearance: Normal appearance. She is well-developed. She is obese. Comments: Appears stated age   Cardiovascular:      Rate and Rhythm: Normal rate and regular rhythm. Heart sounds: Normal heart sounds. No murmur heard. No friction rub. No gallop. Pulmonary:      Effort: Pulmonary effort is normal. No respiratory distress. Breath sounds: Normal breath sounds. No wheezing. Abdominal:      General: Bowel sounds are normal.      Palpations: Abdomen is soft. Neurological:      Mental Status: She is alert. Psychiatric:         Mood and Affect: Mood normal.         Behavior: Behavior normal.         Thought Content: Thought content normal.         Judgment: Judgment normal.         ASSESSMENT and PLAN  Diagnoses and all orders for this visit:    1. Controlled type 2 diabetes mellitus without complication, without long-term current use of insulin (HCC)  -     CBC W/O DIFF; Future  -     METABOLIC PANEL, COMPREHENSIVE; Future  -     HEMOGLOBIN A1C WITH EAG; Future  -     MICROALBUMIN, UR, RAND W/ MICROALB/CREAT RATIO; Future   Good control by fsbs in metformin  2. Pure hypercholesterolemia  -     METABOLIC PANEL, COMPREHENSIVE; Future   On statin  3. Anxiety   Controlled on lexapro  4. Hepatic steatosis   Continue diet and exericse   Liver inst next month  5.  Sore throat  -     CULTURE, THROAT; Future    6. Chronic insomnia   Refill ambien    Follow-up and Dispositions    · Return in about 6 months (around 9/14/2022) for dm-2 hld anxiety fibro.

## 2022-03-14 ENCOUNTER — OFFICE VISIT (OUTPATIENT)
Dept: INTERNAL MEDICINE CLINIC | Age: 57
End: 2022-03-14
Payer: COMMERCIAL

## 2022-03-14 VITALS
HEART RATE: 81 BPM | OXYGEN SATURATION: 97 % | HEIGHT: 64 IN | RESPIRATION RATE: 16 BRPM | DIASTOLIC BLOOD PRESSURE: 60 MMHG | WEIGHT: 201 LBS | TEMPERATURE: 97.1 F | SYSTOLIC BLOOD PRESSURE: 110 MMHG | BODY MASS INDEX: 34.31 KG/M2

## 2022-03-14 DIAGNOSIS — E78.00 PURE HYPERCHOLESTEROLEMIA: ICD-10-CM

## 2022-03-14 DIAGNOSIS — F51.04 CHRONIC INSOMNIA: ICD-10-CM

## 2022-03-14 DIAGNOSIS — J02.9 SORE THROAT: ICD-10-CM

## 2022-03-14 DIAGNOSIS — F51.01 PRIMARY INSOMNIA: ICD-10-CM

## 2022-03-14 DIAGNOSIS — F41.9 ANXIETY: ICD-10-CM

## 2022-03-14 DIAGNOSIS — K76.0 HEPATIC STEATOSIS: ICD-10-CM

## 2022-03-14 DIAGNOSIS — E11.9 CONTROLLED TYPE 2 DIABETES MELLITUS WITHOUT COMPLICATION, WITHOUT LONG-TERM CURRENT USE OF INSULIN (HCC): Primary | ICD-10-CM

## 2022-03-14 LAB
ALBUMIN SERPL-MCNC: 4 G/DL (ref 3.5–5)
ALBUMIN/GLOB SERPL: 1.1 {RATIO} (ref 1.1–2.2)
ALP SERPL-CCNC: 126 U/L (ref 45–117)
ALT SERPL-CCNC: 72 U/L (ref 12–78)
ANION GAP SERPL CALC-SCNC: 5 MMOL/L (ref 5–15)
AST SERPL-CCNC: 45 U/L (ref 15–37)
BILIRUB SERPL-MCNC: 0.3 MG/DL (ref 0.2–1)
BUN SERPL-MCNC: 29 MG/DL (ref 6–20)
BUN/CREAT SERPL: 44 (ref 12–20)
CALCIUM SERPL-MCNC: 9.6 MG/DL (ref 8.5–10.1)
CHLORIDE SERPL-SCNC: 105 MMOL/L (ref 97–108)
CO2 SERPL-SCNC: 31 MMOL/L (ref 21–32)
CREAT SERPL-MCNC: 0.66 MG/DL (ref 0.55–1.02)
CREAT UR-MCNC: 164 MG/DL
ERYTHROCYTE [DISTWIDTH] IN BLOOD BY AUTOMATED COUNT: 14.9 % (ref 11.5–14.5)
EST. AVERAGE GLUCOSE BLD GHB EST-MCNC: 131 MG/DL
GLOBULIN SER CALC-MCNC: 3.5 G/DL (ref 2–4)
GLUCOSE SERPL-MCNC: 110 MG/DL (ref 65–100)
HBA1C MFR BLD: 6.2 % (ref 4–5.6)
HCT VFR BLD AUTO: 40.2 % (ref 35–47)
HGB BLD-MCNC: 12.4 G/DL (ref 11.5–16)
MCH RBC QN AUTO: 28.1 PG (ref 26–34)
MCHC RBC AUTO-ENTMCNC: 30.8 G/DL (ref 30–36.5)
MCV RBC AUTO: 91 FL (ref 80–99)
MICROALBUMIN UR-MCNC: 1.38 MG/DL
MICROALBUMIN/CREAT UR-RTO: 8 MG/G (ref 0–30)
NRBC # BLD: 0 K/UL (ref 0–0.01)
NRBC BLD-RTO: 0 PER 100 WBC
PLATELET # BLD AUTO: 287 K/UL (ref 150–400)
PMV BLD AUTO: 11.7 FL (ref 8.9–12.9)
POTASSIUM SERPL-SCNC: 4.4 MMOL/L (ref 3.5–5.1)
PROT SERPL-MCNC: 7.5 G/DL (ref 6.4–8.2)
RBC # BLD AUTO: 4.42 M/UL (ref 3.8–5.2)
SODIUM SERPL-SCNC: 141 MMOL/L (ref 136–145)
WBC # BLD AUTO: 6.7 K/UL (ref 3.6–11)

## 2022-03-14 PROCEDURE — 99213 OFFICE O/P EST LOW 20 MIN: CPT | Performed by: INTERNAL MEDICINE

## 2022-03-14 RX ORDER — TRIAMTERENE/HYDROCHLOROTHIAZID 37.5-25 MG
1 TABLET ORAL DAILY
Qty: 90 TABLET | Refills: 3 | Status: SHIPPED | OUTPATIENT
Start: 2022-03-14

## 2022-03-14 RX ORDER — ZOLPIDEM TARTRATE 10 MG/1
TABLET ORAL
Qty: 30 TABLET | Refills: 1 | Status: SHIPPED | OUTPATIENT
Start: 2022-03-14 | End: 2022-09-16 | Stop reason: SDUPTHER

## 2022-03-14 NOTE — PATIENT INSTRUCTIONS
Office Policies    Phone calls/patient messages:            Please allow up to 24 hours for someone in the office to contact you about your call or message. Be mindful your provider may be out of the office or your message may require further review. We encourage you to use [employer] for your messages as this is a faster, more efficient way to communicate with our office                         Medication Refills:            Prescription medications require 48-72 business hours to process. We encourage you to use [employer] for your refills. For controlled medications: Please allow 72 business hours to process. Certain medications may require you to  a written prescription at our office. NO narcotic/controlled medications will be prescribed after 4pm Monday through Friday or on weekends              Form/Paperwork Completion:            Please note a $25 fee may incur for all paperwork for completed by our providers. We ask that you allow 7-10 business days. Pre-payment is due prior to picking up/faxing the completed form. You may also download your forms to [employer] to have your doctor print off.

## 2022-03-14 NOTE — TELEPHONE ENCOUNTER
Future Appointments:  Future Appointments   Date Time Provider Law Bermudezi   4/21/2022  9:00 AM Tania Quiroz MD [facility]        Last Appointment With Me:  3/14/2022     Requested Prescriptions     Pending Prescriptions Disp Refills    zolpidem (AMBIEN) 10 mg tablet 30 Tablet 1     Sig: TAKE 1 TABLET BY MOUTH AT BEDTIME    triamterene-hydroCHLOROthiazide (MAXZIDE) 37.5-25 mg per tablet 90 Tablet 3     Sig: Take 1 Tablet by mouth daily.

## 2022-03-16 LAB
BACTERIA SPEC CULT: NORMAL
SERVICE CMNT-IMP: NORMAL

## 2022-03-18 PROBLEM — Z80.0 FH: COLON CANCER: Status: ACTIVE | Noted: 2017-11-01

## 2022-03-19 PROBLEM — E78.00 PURE HYPERCHOLESTEROLEMIA: Status: ACTIVE | Noted: 2019-07-30

## 2022-03-19 PROBLEM — F41.9 ANXIETY: Status: ACTIVE | Noted: 2020-01-27

## 2022-03-20 PROBLEM — R00.2 PALPITATION: Status: ACTIVE | Noted: 2017-02-17

## 2022-03-20 PROBLEM — E66.01 SEVERE OBESITY (BMI 35.0-39.9) WITH COMORBIDITY (HCC): Status: ACTIVE | Noted: 2018-04-12

## 2022-07-28 ENCOUNTER — TRANSCRIBE ORDER (OUTPATIENT)
Dept: SCHEDULING | Age: 57
End: 2022-07-28

## 2022-07-28 ENCOUNTER — OFFICE VISIT (OUTPATIENT)
Dept: HEMATOLOGY | Age: 57
End: 2022-07-28

## 2022-07-28 VITALS
DIASTOLIC BLOOD PRESSURE: 68 MMHG | BODY MASS INDEX: 34.66 KG/M2 | TEMPERATURE: 96.5 F | HEIGHT: 64 IN | HEART RATE: 88 BPM | SYSTOLIC BLOOD PRESSURE: 115 MMHG | WEIGHT: 203 LBS | OXYGEN SATURATION: 95 % | RESPIRATION RATE: 16 BRPM

## 2022-07-28 DIAGNOSIS — R74.8 ELEVATED LIVER ENZYMES: Primary | ICD-10-CM

## 2022-07-28 DIAGNOSIS — R74.8 ACID PHOSPHATASE ELEVATED: Primary | ICD-10-CM

## 2022-07-28 PROBLEM — R00.2 PALPITATION: Status: RESOLVED | Noted: 2017-02-17 | Resolved: 2022-07-28

## 2022-07-28 PROBLEM — Z96.659 HISTORY OF KNEE REPLACEMENT: Status: ACTIVE | Noted: 2022-07-28

## 2022-07-28 RX ORDER — DICLOFENAC SODIUM 75 MG/1
75 TABLET, DELAYED RELEASE ORAL AS NEEDED
COMMUNITY
Start: 2022-05-31

## 2022-07-28 NOTE — PROGRESS NOTES
[facility], MD, [place], [place] Zoltan [place], [place]      MISA Pillai, ACNP-BC     April S Brian, [facility]   VANDANA Louise, [facility]       Maria Teresa Brenner [address]    at [facility]    [address], [address] Yung Goncalves  22.    [phone]    FAX: [address] [place] [address], [address]    [phone]    FAX: [phone]       Patient Care Team:  Christine Olmos MD as PCP - General  Christine Olmos MD as PCP - [facility] Provider  Noel Nielson MD as Consulting Provider (Ophthalmology)  Zhanna Ugarte MD as Physician (General Surgery)  Kaiser Silvestre MD as Physician (Cardiovascular Disease Physician)      Problem List  Date Reviewed: 7/28/2022            Codes Class Noted    History of knee replacement ICD-10-CM: Z96.659  ICD-9-CM: V43.65  7/28/2022        Anxiety ICD-10-CM: F41.9  ICD-9-CM: 300.00  1/27/2020        Pure hypercholesterolemia ICD-10-CM: E78.00  ICD-9-CM: 272.0  7/30/2019        Severe obesity (BMI 35.0-39. 9) with comorbidity [facility]) ICD-10-CM: E66.01  ICD-9-CM: 278.01  4/12/2018        FH: colon cancer ICD-10-CM: Z80.0  ICD-9-CM: V16.0  11/1/2017    Overview Signed 11/1/2017  9:08 AM by Christine Olmos MD     F with colon cancer at age 76             Hepatic steatosis ICD-10-CM: K76.0  ICD-9-CM: 571.8  6/23/2016        Dislocation of prosthetic joint ([address] ICD-10-CM: O46.713D  ICD-9-CM: 996.42  7/25/2014        HLD (hyperlipidemia) ICD-10-CM: E78.5  ICD-9-CM: 272.4  7/11/2014        Prediabetes ICD-10-CM: R73.03  ICD-9-CM: 790.29  9/3/2013        Ischemic colitis ([facility] ICD-10-CM: K55.9  ICD-9-CM: 557.9  9/3/2013        DJD (degenerative joint disease) of knee ICD-10-CM: M17.10  ICD-9-CM: 715.36  6/4/2013        Fibromyalgia ICD-10-CM: M79.7  ICD-9-CM: 729.1  9/30/2011        Insomnia ICD-10-CM: G47.00  ICD-9-CM: 780.52  9/30/2011           The clinicians listed above have asked me to see Hamzah Spring in consultation regarding elevated liver enzymes and its management. All medical records sent by the referring physicians were reviewed including imaging studies     The patient is a 60 y.o.  female who was found to have elevated liver transaminases and alkaline phosphatase in 2018. Serologic evaluation for markers of chronic liver disease has either not been performed or the results are not available. Ultrasound of the liver was performed in 12/2019. The results of the imaging suggested fatty liver disease. An assessment of liver fibrosis with biopsy, Fibroscan or elastography has not been performed. The patient has the following symptoms which could be attributed to the liver disorder:    fatigue,     The patient is not experiencing the following symptoms which are commonly seen in this liver disorder:   pain in the right side over the liver,     The patient completes all daily activities without any functional limitations. ASSESSMENT AND PLAN:  Elevated liver enzymes  Persistent elevation in liver transaminases and alkaline phosphatase of unclear etiology at this time. Have performed laboratory testing to monitor liver function and degree of liver injury. This included BMP, hepatic panel, CBC with platelet count, INR. Liver transaminases are elevated. ALP is elevated. Liver function is normal.  The platelet count is normal.      Based upon laboratory studies and imaging  the patient does not appear to have advanced liver disease.     Serologic testing for causes of chronic liver disease was negative     The most likely causes for the liver chemistry abnormalities were discussed with the patient and include fatty liver disease,     The need to perform a liver biopsy to help determine the cause and severity of the liver test abnormalities was discussed. The risks of performing the liver biopsy including pain, puncture of the lung, gallbladder, intestine or kidney and bleeding were discussed. The patient has decided to have a liver biopsy. This will be scheduled. Screening for Hepatocellular Carcinoma  HCC screening is not necessary if the patient has no evidence of cirrhosis. Treatment of other medical problems in patients with chronic liver disease  There are no contraindications for the patient to take most medications that are necessary for treatment of other medical issues. Counseling for alcohol in patients with chronic liver disease  The patient was counseled regarding alcohol consumption and the effect of alcohol on chronic liver disease. The patient does not consume any significant amount of alcohol. Vaccinations   Vaccination for viral hepatitis A and B is not needed. The patient has serologic evidence of prior exposure or vaccination with immunity. The patient has received 2 doses of COVID-19 vaccine. Routine vaccinations against other bacterial and viral agents can be performed as indicated. Annual flu vaccination should be administered if indicated. ALLERGIES  Allergies   Allergen Reactions    Latex Contact Dermatitis     Welts, redness, \"oozing\" per pt. [place] Anaphylaxis    Demerol [Meperidine] Anaphylaxis    Hydromorphone Other (comments)     Nausea and hypotension    Lyrica [Pregabalin] Swelling     Feet and ankles and hands swelling    Morphine Other (comments)     Hypotension      Pcn [Penicillins] Hives       MEDICATIONS  Current Outpatient Medications   Medication Sig    diclofenac EC (VOLTAREN) 75 mg EC tablet Take 75 mg by mouth as needed. zolpidem (AMBIEN) 10 mg tablet TAKE 1 TABLET BY MOUTH AT BEDTIME    triamterene-hydroCHLOROthiazide (MAXZIDE) 37.5-25 mg per tablet Take 1 Tablet by mouth daily. omeprazole (PRILOSEC) 20 mg capsule TAKE 1 CAPSULE BY MOUTH EVERY DAY    escitalopram oxalate (LEXAPRO) 20 mg tablet TAKE 1 TABLET BY MOUTH EVERY DAY    metFORMIN (GLUCOPHAGE) 500 mg tablet TAKE 2 TABS BY MOUTH TWO (2) TIMES DAILY (WITH MEALS). atorvastatin (LIPITOR) 10 mg tablet TAKE 1 TAB BY MOUTH DAILY. INDICATIONS: HIGH CHOLESTEROL AND HIGH TRIGLYCERIDES    busPIRone (BUSPAR) 5 mg tablet TAKE 1 TABLET BY MOUTH TWICE A DAY    cyclobenzaprine (Flexeril) 10 mg tablet Take 1 Tab by mouth two (2) times a day. multivitamin (ONE A DAY) tablet Take 1 Tab by mouth daily. cyanocobalamin 1,000 mcg tablet Take 1,000 mcg by mouth daily. EPINEPHrine (EPIPEN) 0.3 mg/0.3 mL injection 0.3 mL by IntraMUSCular route once as needed for 1 dose. gabapentin (NEURONTIN) 100 mg tablet Take 300 mg by mouth two (2) times a day. No current facility-administered medications for this visit. SYSTEM REVIEW NOT RELATED TO LIVER DISEASE OR REVIEWED ABOVE:  Constitution systems: Negative for fever, chills, weight gain, weight loss. Eyes: Negative for visual changes. ENT: Negative for sore throat, painful swallowing. Respiratory: Negative for cough, hemoptysis, SOB. Cardiology: Negative for chest pain, palpitations. GI:  Negative for constipation or diarrhea. : Negative for urinary frequency, dysuria, hematuria, nocturia. Skin: Negative for rash. Hematology: Negative for easy bruising, blood clots. Musculo-skelatal: Negative for back pain, muscle pain, weakness. Neurologic: Negative for headaches, dizziness, vertigo, memory problems not related to HE. Psychology: Negative for anxiety, depression. FAMILY HISTORY:  The father  of dementia. The mother Has/had the following chronic disease(s): HTN, osteoporosis. There is no family history of liver disease. SOCIAL HISTORY:  The patient is . The patient has 3 children, 1 , and 9 granldchilden.     The patient has never used tobacco products. The patient has never consumed significant amounts of alcohol. 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 Eyes: Sclera anicteric. ENT: No oral lesions. Thyroid normal.  Nodes: No adenopathy. Skin: No spider angiomata. No jaundice. No palmar erythema. Respiratory: Lungs clear to auscultation. Cardiovascular: Regular heart rate. No murmurs. No JVD. Abdomen: Soft non-tender. Liver size normal to percussion/palpation. Spleen not palpable. No obvious ascites. Extremities: No edema. No muscle wasting. No gross arthritic changes. Neurologic: Alert and oriented. Cranial nerves grossly intact. No asterixis.     LABORATORY STUDIES:  Liver [place] of [address] [place] [address] 7/28/2022 3/14/2022   ANC 1.8 - 8.0 K/UL 4.0    HGB 11.5 - 16.0 g/dL 13.0 12.4    - 400 K/uL 301 287   INR 0.9 - 1.1   1.0    AST 15 - 37 U/L 65 (H) 45 (H)   ALT 12 - 78 U/L 89 (H) 72   Alk Phos 45 - 117 U/L 127 (H) 126 (H)   Bili, Total 0.2 - 1.0 MG/DL 0.4 0.3   Bili, Direct 0.0 - 0.2 MG/DL 0.2    Albumin 3.5 - 5.0 g/dL 4.2 4.0   BUN 6 - 20 MG/DL 21 (H) 29 (H)   BUN (iSTAT) 9 - 20 mg/dL     Creat 0.55 - 1.02 MG/DL 0.68 0.66   Creat (iSTAT) 0.6 - 1.3 mg/dL     Na 136 - 145 mmol/L 143 141   K 3.5 - 5.1 mmol/L 4.4 4.4   Cl 97 - 108 mmol/L 103 105   CO2 21 - 32 mmol/L 31 31   Glucose 65 - 100 mg/dL 98 110 (H)       SEROLOGIES:  Serologies Latest Ref Rng & Units 7/28/2022   Hep A Ab, Total Negative   Positive (A)   Hep B Surface Ag Index <0.10   Hep B Surface Ag Interp Negative   Negative   Hep B Core Ab, Total Negative   Negative   Hep B Surface Ab mIU/mL 35.35   Hep B Surface Ab Interp NONREACTIVE   REACTIVE (A)   Hep C Ab 0.0 - 0.9 s/co ratio <0.1   Ferritin 26 - 388 NG/ML 96   Iron % Saturation 20 - 50 % 10 (L)   LONG, IFA  Negative   ASMCA 0 - 19 Units 5   Ceruloplasmin 19.0 - 39.0 mg/dL 25.0   Alpha-1 antitrypsin level 101 - 187 mg/dL 153       LIVER HISTOLOGY:  Not available or performed    ENDOSCOPIC PROCEDURES:  Not available or performed    RADIOLOGY:  12/2019. Ultrasound of liver. Echogenic consistent with fatty liver. No liver mass lesions. No dilated bile ducts. No ascites. OTHER TESTING:  Not available or performed    FOLLOW-UP:  All of the issues listed above in the Assessment and Plan were discussed with the patient. All questions were answered. The patient expressed a clear understanding of the above. [address] [place] [address] in 2 weeks after liver biopsy.     Adryan Osborne MD  [facility] [address], [address] [place] [address].  [phone]  [address] [place] [address]

## 2022-07-28 NOTE — PROGRESS NOTES
Identified pt with two pt identifiers(name and ). Reviewed record in preparation for visit and have obtained necessary documentation. Chief Complaint   Patient presents with    New Patient     Establish care      Vitals:    22 0822   BP: 115/68   Pulse: 88   Resp: 16   Temp: (!) 96.5 °F (35.8 °C)   TempSrc: Temporal   SpO2: 95%   Weight: 203 lb (92.1 kg)   Height: 5' 4\" (1.626 m)   PainSc:   0 - No pain       Health Maintenance Review: Patient reminded of \"due or due soon\" health maintenance. I have asked the patient to contact his/her primary care provider (PCP) for follow-up on his/her health maintenance. Coordination of Care Questionnaire:  :   1) Have you been to an emergency room, urgent care, or hospitalized since your last visit? If yes, where when, and reason for visit? no       2. Have seen or consulted any other health care provider since your last visit? If yes, where when, and reason for visit? NO      Patient is accompanied by self I have received verbal consent from Marina Cuevas to discuss any/all medical information while they are present in the room.

## 2022-07-28 NOTE — Clinical Note
8/14/2022    Patient: Gulshan Mendoza   YOB: 1965   Date of Visit: 7/28/2022     Orquidea Livingston MD  [address]  [address] [facility] [address]  [facility] [address]    Dear Orquidea Livingston MD,      Thank you for referring Ms. Maik Turk to [address] for evaluation. My notes for this consultation are attached. If you have questions, please do not hesitate to call me. I look forward to following your patient along with you.       Sincerely,    Vivian Salmeron MD

## 2022-07-29 LAB
A1AT SERPL-MCNC: 153 MG/DL (ref 101–187)
ALBUMIN SERPL-MCNC: 4.2 G/DL (ref 3.5–5)
ALBUMIN/GLOB SERPL: 1.3 {RATIO} (ref 1.1–2.2)
ALP SERPL-CCNC: 127 U/L (ref 45–117)
ALT SERPL-CCNC: 89 U/L (ref 12–78)
ANION GAP SERPL CALC-SCNC: 9 MMOL/L (ref 5–15)
AST SERPL-CCNC: 65 U/L (ref 15–37)
BASOPHILS # BLD: 0 K/UL (ref 0–0.1)
BASOPHILS NFR BLD: 1 % (ref 0–1)
BILIRUB DIRECT SERPL-MCNC: 0.2 MG/DL (ref 0–0.2)
BILIRUB SERPL-MCNC: 0.4 MG/DL (ref 0.2–1)
BUN SERPL-MCNC: 21 MG/DL (ref 6–20)
BUN/CREAT SERPL: 31 (ref 12–20)
CALCIUM SERPL-MCNC: 10 MG/DL (ref 8.5–10.1)
CHLORIDE SERPL-SCNC: 103 MMOL/L (ref 97–108)
CHOLEST SERPL-MCNC: 157 MG/DL
CO2 SERPL-SCNC: 31 MMOL/L (ref 21–32)
CREAT SERPL-MCNC: 0.68 MG/DL (ref 0.55–1.02)
DIFFERENTIAL METHOD BLD: ABNORMAL
EOSINOPHIL # BLD: 0.1 K/UL (ref 0–0.4)
EOSINOPHIL NFR BLD: 2 % (ref 0–7)
ERYTHROCYTE [DISTWIDTH] IN BLOOD BY AUTOMATED COUNT: 15.1 % (ref 11.5–14.5)
EST. AVERAGE GLUCOSE BLD GHB EST-MCNC: 137 MG/DL
FERRITIN SERPL-MCNC: 96 NG/ML (ref 26–388)
GLOBULIN SER CALC-MCNC: 3.3 G/DL (ref 2–4)
GLUCOSE SERPL-MCNC: 98 MG/DL (ref 65–100)
HAV AB SER QL IA: POSITIVE
HBA1C MFR BLD: 6.4 % (ref 4–5.6)
HBV CORE AB SERPL QL IA: NEGATIVE
HBV SURFACE AB SER QL: REACTIVE
HBV SURFACE AB SER-ACNC: 35.35 MIU/ML
HBV SURFACE AG SER QL: <0.1 INDEX
HBV SURFACE AG SER QL: NEGATIVE
HCT VFR BLD AUTO: 42.6 % (ref 35–47)
HDLC SERPL-MCNC: 52 MG/DL
HDLC SERPL: 3 {RATIO} (ref 0–5)
HGB BLD-MCNC: 13 G/DL (ref 11.5–16)
IMM GRANULOCYTES # BLD AUTO: 0 K/UL (ref 0–0.04)
IMM GRANULOCYTES NFR BLD AUTO: 0 % (ref 0–0.5)
INR PPP: 1 (ref 0.9–1.1)
IRON SATN MFR SERPL: 10 % (ref 20–50)
IRON SERPL-MCNC: 34 UG/DL (ref 35–150)
LDLC SERPL CALC-MCNC: 75.4 MG/DL (ref 0–100)
LYMPHOCYTES # BLD: 1.7 K/UL (ref 0.8–3.5)
LYMPHOCYTES NFR BLD: 27 % (ref 12–49)
MCH RBC QN AUTO: 28.3 PG (ref 26–34)
MCHC RBC AUTO-ENTMCNC: 30.5 G/DL (ref 30–36.5)
MCV RBC AUTO: 92.6 FL (ref 80–99)
MONOCYTES # BLD: 0.4 K/UL (ref 0–1)
MONOCYTES NFR BLD: 7 % (ref 5–13)
NEUTS SEG # BLD: 4 K/UL (ref 1.8–8)
NEUTS SEG NFR BLD: 63 % (ref 32–75)
NRBC # BLD: 0 K/UL (ref 0–0.01)
NRBC BLD-RTO: 0 PER 100 WBC
PLATELET # BLD AUTO: 301 K/UL (ref 150–400)
PMV BLD AUTO: 11.3 FL (ref 8.9–12.9)
POTASSIUM SERPL-SCNC: 4.4 MMOL/L (ref 3.5–5.1)
PROT SERPL-MCNC: 7.5 G/DL (ref 6.4–8.2)
PROTHROMBIN TIME: 10.3 SEC (ref 9–11.1)
RBC # BLD AUTO: 4.6 M/UL (ref 3.8–5.2)
SODIUM SERPL-SCNC: 143 MMOL/L (ref 136–145)
TIBC SERPL-MCNC: 354 UG/DL (ref 250–450)
TRIGL SERPL-MCNC: 148 MG/DL (ref ?–150)
VLDLC SERPL CALC-MCNC: 29.6 MG/DL
WBC # BLD AUTO: 6.3 K/UL (ref 3.6–11)

## 2022-07-30 LAB
CERULOPLASMIN SERPL-MCNC: 25 MG/DL (ref 19–39)
HCV AB S/CO SERPL IA: <0.1 S/CO RATIO (ref 0–0.9)
HCV AB SERPL QL IA: NORMAL

## 2022-07-31 LAB — SMA IGG SER-ACNC: 5 UNITS (ref 0–19)

## 2022-08-03 LAB — ANA TITR SER IF: NEGATIVE {TITER}

## 2022-08-09 RX ORDER — BUSPIRONE HYDROCHLORIDE 5 MG/1
5 TABLET ORAL
COMMUNITY
End: 2022-09-16 | Stop reason: SDUPTHER

## 2022-08-18 ENCOUNTER — HOSPITAL ENCOUNTER (OUTPATIENT)
Age: 57
Setting detail: OUTPATIENT SURGERY
Discharge: HOME | End: 2022-08-18
Attending: INTERNAL MEDICINE | Admitting: INTERNAL MEDICINE
Payer: COMMERCIAL

## 2022-08-18 ENCOUNTER — HOSPITAL ENCOUNTER (OUTPATIENT)
Dept: ULTRASOUND IMAGING | Age: 57
Discharge: HOME | End: 2022-08-18
Attending: INTERNAL MEDICINE
Payer: COMMERCIAL

## 2022-08-18 VITALS
WEIGHT: 201 LBS | DIASTOLIC BLOOD PRESSURE: 70 MMHG | HEIGHT: 64 IN | RESPIRATION RATE: 16 BRPM | SYSTOLIC BLOOD PRESSURE: 109 MMHG | HEART RATE: 81 BPM | BODY MASS INDEX: 34.31 KG/M2 | OXYGEN SATURATION: 97 % | TEMPERATURE: 98.9 F

## 2022-08-18 DIAGNOSIS — R74.8 ACID PHOSPHATASE ELEVATED: ICD-10-CM

## 2022-08-18 DIAGNOSIS — R74.8 ELEVATED LIVER ENZYMES: Primary | ICD-10-CM

## 2022-08-18 DIAGNOSIS — K76.0 NAFLD (NONALCOHOLIC FATTY LIVER DISEASE): ICD-10-CM

## 2022-08-18 PROCEDURE — 88307 TISSUE EXAM BY PATHOLOGIST: CPT

## 2022-08-18 PROCEDURE — 47000 NEEDLE BIOPSY OF LIVER PERQ: CPT | Performed by: INTERNAL MEDICINE

## 2022-08-18 PROCEDURE — 77030013826 HC NDL BIOP MAXCOR BARD -B: Performed by: INTERNAL MEDICINE

## 2022-08-18 PROCEDURE — 77030014115: Performed by: INTERNAL MEDICINE

## 2022-08-18 PROCEDURE — 88313 SPECIAL STAINS GROUP 2: CPT

## 2022-08-18 PROCEDURE — 76942 ECHO GUIDE FOR BIOPSY: CPT | Performed by: INTERNAL MEDICINE

## 2022-08-18 PROCEDURE — 76040000019: Performed by: INTERNAL MEDICINE

## 2022-08-18 PROCEDURE — 76942 ECHO GUIDE FOR BIOPSY: CPT

## 2022-08-18 RX ORDER — SODIUM CHLORIDE 0.9 % (FLUSH) 0.9 %
5-40 SYRINGE (ML) INJECTION AS NEEDED
Status: DISCONTINUED | OUTPATIENT
Start: 2022-08-18 | End: 2022-08-18 | Stop reason: HOSPADM

## 2022-08-18 RX ORDER — LIDOCAINE HYDROCHLORIDE 10 MG/ML
10 INJECTION INFILTRATION; PERINEURAL ONCE
Status: DISCONTINUED | OUTPATIENT
Start: 2022-08-18 | End: 2022-08-18 | Stop reason: HOSPADM

## 2022-08-18 RX ORDER — SODIUM CHLORIDE 0.9 % (FLUSH) 0.9 %
5-40 SYRINGE (ML) INJECTION EVERY 8 HOURS
Status: DISCONTINUED | OUTPATIENT
Start: 2022-08-18 | End: 2022-08-18 | Stop reason: HOSPADM

## 2022-08-18 RX ORDER — ONDANSETRON 2 MG/ML
4 INJECTION INTRAMUSCULAR; INTRAVENOUS
Status: DISCONTINUED | OUTPATIENT
Start: 2022-08-18 | End: 2022-08-18 | Stop reason: HOSPADM

## 2022-08-18 RX ORDER — FENTANYL CITRATE 50 UG/ML
25 INJECTION, SOLUTION INTRAMUSCULAR; INTRAVENOUS
Status: DISCONTINUED | OUTPATIENT
Start: 2022-08-18 | End: 2022-08-18 | Stop reason: HOSPADM

## 2022-08-18 NOTE — H&P
[address] [facility] [place], Dheeraj SORTO, [place], [place]      MISA Napier Che, [facility]-BC     Zelda Torres, [facility]   VANDANA Valle, [facility]       Maria Teresa Brenner [address]    at [address], [facility] [address]    [phone]    FAX: [address] [place] [address] Avenue    at [address], [address]    [phone]    FAX: [phone]         PRE-PROCEDURE NOTE - LIVER BIOPSY    H and P from last office visit reviewed. Allergies reviewed. Out-patient medication list reviewed. Patient Active Problem List   Diagnosis Code    Fibromyalgia M79.7    Insomnia G47.00    DJD (degenerative joint disease) of knee M17.10    Prediabetes R73.03    Ischemic colitis ([address] K55.9    HLD (hyperlipidemia) E78.5    Dislocation of prosthetic joint ([facility] T84.029A    Hepatic steatosis K76.0    FH: colon cancer Z80.0    Severe obesity (BMI 35.0-39. 9) with comorbidity ([facility] E66.01    Pure hypercholesterolemia E78.00    Anxiety F41.9    History of knee replacement Z96.659       Allergies   Allergen Reactions    Latex Contact Dermatitis     Welts, redness, \"oozing\" per pt. Demerol [Meperidine] Anaphylaxis    [place] Anaphylaxis    Hydromorphone Other (comments)     Nausea and hypotension    Lyrica [Pregabalin] Swelling     Feet and ankles and hands swelling    Morphine Other (comments)     Hypotension      Pcn [Penicillins] Hives       No current facility-administered medications on file prior to encounter. Current Outpatient Medications on File Prior to Encounter   Medication Sig Dispense Refill    busPIRone (BUSPAR) 5 mg tablet Take 5 mg by mouth daily as needed.       diclofenac EC (VOLTAREN) 75 mg EC tablet Take 75 mg by mouth as needed. zolpidem (AMBIEN) 10 mg tablet TAKE 1 TABLET BY MOUTH AT BEDTIME 30 Tablet 1    triamterene-hydroCHLOROthiazide (MAXZIDE) 37.5-25 mg per tablet Take 1 Tablet by mouth daily. 90 Tablet 3    omeprazole (PRILOSEC) 20 mg capsule TAKE 1 CAPSULE BY MOUTH EVERY DAY 90 Capsule 3    escitalopram oxalate (LEXAPRO) 20 mg tablet TAKE 1 TABLET BY MOUTH EVERY DAY 90 Tablet 3    metFORMIN (GLUCOPHAGE) 500 mg tablet TAKE 2 TABS BY MOUTH TWO (2) TIMES DAILY (WITH MEALS). 360 Tablet 3    atorvastatin (LIPITOR) 10 mg tablet TAKE 1 TAB BY MOUTH DAILY. INDICATIONS: HIGH CHOLESTEROL AND HIGH TRIGLYCERIDES 90 Tablet 3    cyclobenzaprine (Flexeril) 10 mg tablet Take 1 Tab by mouth two (2) times a day. 180 Tab 1    multivitamin (ONE A DAY) tablet Take 1 Tab by mouth daily. cyanocobalamin 1,000 mcg tablet Take 1,000 mcg by mouth daily. gabapentin (NEURONTIN) 100 mg tablet Take 300 mg by mouth two (2) times a day. EPINEPHrine (EPIPEN) 0.3 mg/0.3 mL injection 0.3 mL by IntraMUSCular route once as needed for 1 dose. 1 Each 1       For liver biopsy to assess NALFD. The risks of the procedure were discussed with the patient. This included bleeding, pain, and puncture of other organs. All questions were answered. The patient wishes to proceed with the procedure. The patient was counseled at length about the risks of shelly Covid-19 in the gino-operative and post-operative states including the recovery window of their procedure. The patient was made aware that shelly Covid-19 after a surgical procedure may worsen their prognosis for recovering from the virus and lend to a higher morbidity and or mortality risk. The patient was given the options of postponing their procedure. All of the risks, benefits, and alternatives were discussed. The patient does  wish to proceed with the procedure.       PHYSICAL EXAMINATION:  Visit Vitals  /76   Pulse 86   Temp 98.9 °F (37.2 °C)   Resp 16   Ht 5' 4\" (1.626 m)   Wt 201 lb (91.2 kg)   SpO2 96%   Breastfeeding No   BMI 34.50 kg/m²       General: No acute distress. Eyes: Sclera anicteric. ENT: No oral lesions. Thyroid normal.  Nodes: No adenopathy. Skin: No spider angiomata. No jaundice. No palmar erythema. Respiratory: Lungs clear to auscultation. Cardiovascular: Regular heart rate. No murmurs. No JVD. Abdomen: Soft non-tender, liver size normal to percussion/palpation. Spleen not palpable. No obvious ascites. Extremities: No edema. No muscle wasting. No gross arthritic changes. Neurologic: Alert and oriented. Cranial nerves grossly intact. No asterixis. LABS:  Lab Results   Component Value Date/Time    WBC 6.3 07/28/2022 10:04 AM    HGB 13.0 07/28/2022 10:04 AM    Hematocrit (POC) 41 07/27/2018 06:55 AM    HCT 42.6 07/28/2022 10:04 AM    PLATELET 810 07/21/5239 10:04 AM    MCV 92.6 07/28/2022 10:04 AM     Lab Results   Component Value Date/Time    INR 1.0 07/28/2022 10:04 AM    INR 1.6 (H) 07/28/2014 04:02 AM    INR 1.0 07/26/2014 02:55 AM    INR (POC) 1.5 (H) 07/27/2014 08:18 AM    Prothrombin time 10.3 07/28/2022 10:04 AM    Prothrombin time 16.5 (H) 07/28/2014 04:02 AM    Prothrombin time 10.9 07/26/2014 02:55 AM       ASSESSMENT AND PLAN:  Liver biopsy under ultrasound guidance.     Danyel Lara MD  [address] of [address], [facility].  [phone]  [address] [facility]

## 2022-08-18 NOTE — PROCEDURES
[facility], [place], [address], [place], [place]      MISA Holley [facility]RAVIN Torres [facility]   VANDANA Luevano, [facility]       Maria Teresa Brenner [facility] [address]    at [address], [facility] Yung Goncalves  22.    [phone]    FAX: [address] [place] [address], [address]    [phone]    FAX: [phone]         LIVER BIOPSY PROCEDURE NOTE    Jorge Doe  1965    INDICATIONS/PRE-OPERATIVE  DIAGNOSIS: NAFLD  Elevated liver enzymes      : Valma Dakins, MD    SURGICAL ASSISTANT:  None    PROSTHETIC DEVICES, TISSUE GRAFTS, TRANSPLANTED ORGANS:  Not applicable    SEDATION: 1% Lidocaine injection 10 ml    PROCEDURE:  Informed consent to perform the procedure was obtained from the patient. The patient was positioned on the edge of the stretcher lying flat in the supine position. Ultrasound was utilized to image the liver. The diaphragm and any major mass lesion or vascular structures within the liver were identified. An appropriate site for liver biopsy was identified. The distance from the surface of the skin to the liver capsule was 4 cm. This area was prepped with betadine and draped in sterile fashion. The skin was infiltrated with 1% lidocaine. The deeper subcutanous tissues and liver capsule overlying the biopsy site were then infiltrated with 1% lidocaine until appropriate anesthesia was obtained. A small incision was made in the skin so the biopsy devise could be easily inserted. A total of 2 passes with the 16 gauge Bard biopsy devise was then made into the liver. Core(s) of liver tissue totaling 4 cm in length were obtained and placed into tissue fixative.   A band aid was placed over the biopsy site. The patient was then repositioned on the right side and transported to the recovery area on the stretcher for routine monitoring until discharge. The specimen was sent to pathology for processing via the normal transport mechanism. SPECIMEN COLLECTED: Liver    INTERVENTIONS:  None    ESTIMATED BLOOD LOSS: Negligible.      COMPLICATIONS:  None    POST-OPERATIVE DIAGNOSIS: Same as Pre-operative Diagnosis      Malathi Ponce MD  [place] [address].  [phone]  [address] [place] [address]

## 2022-08-18 NOTE — DISCHARGE INSTRUCTIONS
[address] [facility] [place], MD, FACP, [place], [place]      MISA Melara, [facility]     Zelda Torres, [facility]   VANDANA Hastings [facility]       Maria Teresa Brenner [facility] [address]    at [address], [facility] Yung Goncalves  22.    [phone]    FAX: [address] [place] [address] [address], [address]    [phone]    FAX: [phone]         LIVER BIOPSY DISCHARGE INSTRUCTIONS      Emili Jocelyn  1965  Date: 8/18/2022    DIET:    Magda Caro may resume your previous diet. ACTIVITIES:  Rest quietly the rest of today. You should not lift any objects more than 20 pounds for the next 2 days. If you work sitting down without strenuous activity you may return to work tomorrow. If you exert yourself or do heavy lifting at work you should take tomorrow off. Do not drive or operate hazardous machinery for 12 hours after you are discharged from this procedure. SPECIAL INSTRUCTIONS:  Do not use any aspirin or non-steroidal (Motin, Advil, Naproxen, etc) pain medications for the next 2 days. You may use extra-strength Tylenol (acetaminophen) if you experience pain or discomfort later today. Restarting blood thinners: If you were taking blood thinners prior to the procedure you can restart these in 2 days. Call the [facility] [address] office if you experience any of the following:  Persistent or severe abdominal pain. Persistent or severe abdominal distention. Fever and chills   Nausea and vomiting. New or unusual symptoms. Follow-up care: You should have a follow up appointment with Dr. Francesco Abbott to review the results of the liver biopsy results in 2 weeks.   If you do not have an appointment please call the office at the number listed above to schedule this. Other instructions: If you have any problems or questions call the [facility] [address] [place] [address] office at the phone number listed above. DISCHARGE SUMMARY from Nurse: The following personal items collected during your admission are returned to you:   Dental Appliance: Dental Appliances: None  Vision: Visual Aid: None  Hearing Aid:    Jewelry:    Clothing:    Other Valuables:    Valuables sent to safe:            Learning About Coronavirus (COVID-19)  Coronavirus (COVID-19): Overview  What is coronavirus (EBACJ-66)? The coronavirus disease (COVID-19) is caused by a virus. It is an illness that was first found in [place], [place], in December 2019. It has since spread worldwide. The virus can cause fever, cough, and trouble breathing. In severe cases, it can cause pneumonia and make it hard to breathe without help. It can cause death. Coronaviruses are a large group of viruses. They cause the common cold. They also cause more serious illnesses like Middle East respiratory syndrome (MERS) and severe acute respiratory syndrome (SARS). COVID-19 is caused by a novel coronavirus. That means it's a new type that has not been seen in people before. This virus spreads person-to-person through droplets from coughing and sneezing. It can also spread when you are close to someone who is infected. And it can spread when you touch something that has the virus on it, such as a doorknob or a tabletop. What can you do to protect yourself from coronavirus (COVID-19)? The best way to protect yourself from getting sick is to: Avoid areas where there is an outbreak. Avoid contact with people who may be infected. Wash your hands often with soap or alcohol-based hand sanitizers. Avoid crowds and try to stay at least 6 feet away from other people. Wash your hands often, especially after you cough or sneeze. Use soap and water, and scrub for at least 20 seconds.  If soap and water aren't available, use an alcohol-based hand . Avoid touching your mouth, nose, and eyes. What can you do to avoid spreading the virus to others? To help avoid spreading the virus to others:  Cover your mouth with a tissue when you cough or sneeze. Then throw the tissue in the trash. Use a disinfectant to clean things that you touch often. Stay home if you are sick or have been exposed to the virus. Don't go to school, work, or public areas. And don't use public transportation. If you are sick:  Leave your home only if you need to get medical care. But call the doctor's office first so they know you're coming. And wear a face mask, if you have one. If you have a face mask, wear it whenever you're around other people. It can help stop the spread of the virus when you cough or sneeze. Clean and disinfect your home every day. Use household  and disinfectant wipes or sprays. Take special care to clean things that you grab with your hands. These include doorknobs, remote controls, phones, and handles on your refrigerator and microwave. And don't forget countertops, tabletops, bathrooms, and computer keyboards. When to call for help  Call 911 anytime you think you may need emergency care. For example, call if:  You have severe trouble breathing. (You can't talk at all.)  You have constant chest pain or pressure. You are severely dizzy or lightheaded. You are confused or can't think clearly. Your face and lips have a blue color. You pass out (lose consciousness) or are very hard to wake up. Call your doctor now if you develop symptoms such as:  Shortness of breath. Fever. Cough. If you need to get care, call ahead to the doctor's office for instructions before you go. Make sure you wear a face mask, if you have one, to prevent exposing other people to the virus. Where can you get the latest information? The following health organizations are tracking and studying this virus.  Their websites contain the most up-to-date information. Rema Kumar also learn what to do if you think you may have been exposed to the virus. U.S. Centers for Disease Control and Prevention (CDC): The CDC provides updated news about the disease and travel advice. The website also tells you how to prevent the spread of infection. www.cdc.gov  World Health Organization [facility]): WHO offers information about the virus outbreaks. WHO also has travel advice. www.who.int  Current as of: April 1, 2020               Content Version: 12.4  © 9063-0742 Healthwise, Incorporated. Care instructions adapted under license by your healthcare professional. If you have questions about a medical condition or this instruction, always ask your healthcare professional. [address] any warranty or liability for your use of this information.

## 2022-09-01 ENCOUNTER — OFFICE VISIT (OUTPATIENT)
Dept: HEMATOLOGY | Age: 57
End: 2022-09-01
Payer: COMMERCIAL

## 2022-09-01 VITALS
WEIGHT: 199 LBS | HEART RATE: 79 BPM | SYSTOLIC BLOOD PRESSURE: 107 MMHG | OXYGEN SATURATION: 98 % | TEMPERATURE: 96.7 F | HEIGHT: 64 IN | RESPIRATION RATE: 16 BRPM | DIASTOLIC BLOOD PRESSURE: 72 MMHG | BODY MASS INDEX: 33.97 KG/M2

## 2022-09-01 DIAGNOSIS — K75.81 NASH (NONALCOHOLIC STEATOHEPATITIS): Primary | ICD-10-CM

## 2022-09-01 PROCEDURE — 99214 OFFICE O/P EST MOD 30 MIN: CPT | Performed by: INTERNAL MEDICINE

## 2022-09-01 RX ORDER — OMEPRAZOLE 40 MG/1
40 CAPSULE, DELAYED RELEASE ORAL 2 TIMES DAILY
COMMUNITY
Start: 2022-08-15

## 2022-09-01 NOTE — Clinical Note
9/29/2022    Patient: Serg Sandoval   YOB: 1965   Date of Visit: 9/1/2022     Doris Vega MD  [address]  [facility] 83.  Via In [facility] [address]    Dear Doris Vega MD,      Thank you for referring Ms. Jethro Schwarz to [address] for evaluation. My notes for this consultation are attached. If you have questions, please do not hesitate to call me. I look forward to following your patient along with you.       Sincerely,    Edith Peabody, MD

## 2022-09-01 NOTE — PROGRESS NOTES
[address] [facility], MD, [address], [place], [place]      Snow Downing, MISA Guillory, ACNP-BC     Zelda Torres, [facility]   AUSTIN GreggP-JOANN Diaz, [facility]       Maria Teresa Brenner [address]    at [address], [address] Yung Goncalves  22.    [phone]    FAX: [address] [facility] [address]    at [address], [address], [address]    [phone]    FAX: [phone]       Patient Care Team:  Endy Mccabe MD as PCP - General  Endy Mccabe MD as PCP - [facility] Empaneled Provider  Rosemarie Paulino MD as Consulting Provider (Ophthalmology)  Carrillo Grider MD as Physician (General Surgery)  Mary Ann Stringer MD as Physician (Cardiovascular Disease Physician)      Problem List  Date Reviewed: 9/29/2022            Codes Class Noted    History of knee replacement ICD-10-CM: Z96.659  ICD-9-CM: V43.65  7/28/2022        Anxiety ICD-10-CM: F41.9  ICD-9-CM: 300.00  1/27/2020        Pure hypercholesterolemia ICD-10-CM: E78.00  ICD-9-CM: 272.0  7/30/2019        Severe obesity (BMI 35.0-39. 9) with comorbidity ([facility].) ICD-10-CM: E66.01  ICD-9-CM: 278.01  4/12/2018        CROCKER (nonalcoholic steatohepatitis) ICD-10-CM: K75.81  ICD-9-CM: 571.8  6/23/2016        Dislocation of prosthetic joint ([facility] ICD-10-CM: C64.256Q  ICD-9-CM: 996.42  7/25/2014        HLD (hyperlipidemia) ICD-10-CM: E78.5  ICD-9-CM: 272.4  7/11/2014        Prediabetes ICD-10-CM: R73.03  ICD-9-CM: 790.29  9/3/2013        Ischemic colitis ([address] ICD-10-CM: K55.9  ICD-9-CM: 557.9  9/3/2013        DJD (degenerative joint disease) of knee ICD-10-CM: M17.10  ICD-9-CM: 715.36  6/4/2013        Fibromyalgia ICD-10-CM: M79.7  ICD-9-CM: 729.1  9/30/2011        Insomnia ICD-10-CM: G47.00  ICD-9-CM: 780.52  9/30/2011 Jorge [facility] for management of non-alcoholic steatohepatitis (CROCKER). The active problem list, all pertinent past medical history, medications, liver histology, radiologic findings and laboratory findings related to the liver disorder were reviewed and discussed with the patient. The patient is a 59 y.o.  female who was found to have elevated liver transaminases and alkaline phosphatase in 2018. The patient underwent a liver biopsy in 8/2022. The procedure was well tolerated. I have personally reviewed and interpreted the liver biopsy slides. This demonstrates CROCKER with stage 3 fibrosis. The patient has the following symptoms which could be attributed to the liver disorder:    fatigue,     The patient is not experiencing the following symptoms which are commonly seen in this liver disorder:   pain in the right side over the liver,     The patient completes all daily activities without any functional limitations. ASSESSMENT AND PLAN:  CROCKER  The diagnosis is based upon liver biopsy, features of metabolic syndrome, serologic studies that are negative for other causes of chronic liver disease,     A liver biopsy performed in 8/2022 demonstrates moderate steatosis, moderate inflammation, severe ballooning and stage 3 fibrosis    Liver transaminases are elevated. ALP is elevated. Liver function is normal.  The platelet count is normal.      If the patient looses 20% of current body weight, which is 38 pounds, down to a weight of of 150 pounds, all steatosis will have resolved. Once all steatosis has resolved all inflammation will resolve. Then all fibrosis will gradually resolve and the liver could eventually be normal.    There is currently no FDA approved medical treatment for fatty liver, NALFD or CROCKER. The only medical treatments for CROCKER are though clinical trials.     The patient would like to participate in a clinical trial.    Counseling for diet and weight loss in patients with confirmed or suspected NAFLD  The patient was counseled regarding diet and exercise to achieve weight loss. The best diet for patients with fatty liver is one very low in carbohydrates and enriched with protein such as an Konrad's program.      The patient was told not to consume any food products and drinks containing fructose as this enhances hepatic fat synthesis. There is no medication or vitamin supplements that we advocate for CROCKER. Using glitazones in patients without diabetes mellitus has been shown to reduce fat content in the liver but has no effect on fibrosis and is associated with weight gain. Vitamin E has also been used but the data is not very good and most experts no longer advocate this. Screening for Hepatocellular Carcinoma  HCC screening is not necessary if the patient has no evidence of cirrhosis. Treatment of other medical problems in patients with chronic liver disease  There are no contraindications for the patient to take most medications that are necessary for treatment of other medical issues. Counseling for alcohol in patients with chronic liver disease  The patient was counseled regarding alcohol consumption and the effect of alcohol on chronic liver disease. The patient does not consume any significant amount of alcohol. Vaccinations   Vaccination for viral hepatitis A and B is not needed. The patient has serologic evidence of prior exposure or vaccination with immunity. The patient has received 2 doses of COVID-19 vaccine. Routine vaccinations against other bacterial and viral agents can be performed as indicated. Annual flu vaccination should be administered if indicated. ALLERGIES  Allergies   Allergen Reactions    Latex Contact Dermatitis     Welts, redness, \"oozing\" per pt.     Demerol [Meperidine] Anaphylaxis    Strawberry Anaphylaxis    Hydromorphone Other (comments)     Nausea and hypotension    Lyrica [Pregabalin] Swelling     Feet and ankles and hands swelling and headaches    Morphine Other (comments)     Hypotension      Pcn [Penicillins] Hives       MEDICATIONS  Current Outpatient Medications   Medication Sig    omeprazole (PRILOSEC) 40 mg capsule Take 40 mg by mouth two (2) times a day. diclofenac EC (VOLTAREN) 75 mg EC tablet Take 75 mg by mouth as needed. triamterene-hydroCHLOROthiazide (MAXZIDE) 37.5-25 mg per tablet Take 1 Tablet by mouth daily. escitalopram oxalate (LEXAPRO) 20 mg tablet TAKE 1 TABLET BY MOUTH EVERY DAY    metFORMIN (GLUCOPHAGE) 500 mg tablet TAKE 2 TABS BY MOUTH TWO (2) TIMES DAILY (WITH MEALS). atorvastatin (LIPITOR) 10 mg tablet TAKE 1 TAB BY MOUTH DAILY. INDICATIONS: HIGH CHOLESTEROL AND HIGH TRIGLYCERIDES    cyclobenzaprine (Flexeril) 10 mg tablet Take 1 Tab by mouth two (2) times a day. multivitamin (ONE A DAY) tablet Take 1 Tab by mouth daily. cyanocobalamin 1,000 mcg tablet Take 1,000 mcg by mouth daily. EPINEPHrine (EPIPEN) 0.3 mg/0.3 mL injection 0.3 mL by IntraMUSCular route once as needed for 1 dose. gabapentin (NEURONTIN) 100 mg tablet Take 300 mg by mouth two (2) times a day. semaglutide (Ozempic) 0.25 mg or 0.5 mg/dose (2 mg/1.5 ml) subq pen 0.25 mg by SubCUTAneous route every seven (7) days. busPIRone (BUSPAR) 5 mg tablet Take 1 Tablet by mouth two (2) times a day. zolpidem (AMBIEN) 10 mg tablet TAKE 1 TABLET BY MOUTH AT BEDTIME     No current facility-administered medications for this visit. SYSTEM REVIEW NOT RELATED TO LIVER DISEASE OR REVIEWED ABOVE:  Constitution systems: Negative for fever, chills, weight gain, weight loss. Eyes: Negative for visual changes. ENT: Negative for sore throat, painful swallowing. Respiratory: Negative for cough, hemoptysis, SOB. Cardiology: Negative for chest pain, palpitations. GI:  Negative for constipation or diarrhea.   : Negative for urinary frequency, dysuria, hematuria, nocturia. Skin: Negative for rash. Hematology: Negative for easy bruising, blood clots. Musculo-skelatal: Negative for back pain, muscle pain, weakness. Neurologic: Negative for headaches, dizziness, vertigo, memory problems not related to HE. Psychology: Negative for anxiety, depression. FAMILY HISTORY:  The father  of dementia. The mother Has/had the following chronic disease(s): HTN, osteoporosis. There is no family history of liver disease. SOCIAL HISTORY:  The patient is . The patient has 3 children, 1 , and 9 granldchilden. The patient has never used tobacco products. The patient has never consumed significant amounts of alcohol. The patient currently works full time as pre-authorization RN for [employer]  78.:  Visit Vitals  /72 (BP 1 Location: Right upper arm, BP Patient Position: Sitting, BP Cuff Size: Large adult)   Pulse 79   Temp (!) 96.7 °F (35.9 °C) (Temporal)   Resp 16   Ht 5' 4\" (1.626 m)   Wt 199 lb (90.3 kg)   SpO2 98%   BMI 34.16 kg/m²     General: No acute distress. Eyes: Sclera anicteric. ENT: No oral lesions. Thyroid normal.  Nodes: No adenopathy. Skin: No spider angiomata. No jaundice. No palmar erythema. Respiratory: Lungs clear to auscultation. Cardiovascular: Regular heart rate. No murmurs. No JVD. Abdomen: Soft non-tender. Liver size normal to percussion/palpation. Spleen not palpable. No obvious ascites. Extremities: No edema. No muscle wasting. No gross arthritic changes. Neurologic: Alert and oriented. Cranial nerves grossly intact. No asterixis.     LABORATORY STUDIES:  Liver [place] of [address] [place] [address] Units 2022 3/14/2022   ANC 1.8 - 8.0 K/UL 4.0    HGB 11.5 - 16.0 g/dL 13.0 12.4    - 400 K/uL 301 287   INR 0.9 - 1.1   1.0    AST 15 - 37 U/L 65 (H) 45 (H)   ALT 12 - 78 U/L 89 (H) 72   Alk Phos 45 - 117 U/L 127 (H) 126 (H)   Bili, Total 0.2 - 1.0 MG/DL 0.4 0.3   Bili, Direct 0.0 - 0.2 MG/DL 0.2    Albumin 3.5 - 5.0 g/dL 4.2 4.0   BUN 6 - 20 MG/DL 21 (H) 29 (H)   BUN (iSTAT) 9 - 20 mg/dL     Creat 0.55 - 1.02 MG/DL 0.68 0.66   Creat (iSTAT) 0.6 - 1.3 mg/dL     Na 136 - 145 mmol/L 143 141   K 3.5 - 5.1 mmol/L 4.4 4.4   Cl 97 - 108 mmol/L 103 105   CO2 21 - 32 mmol/L 31 31   Glucose 65 - 100 mg/dL 98 110 (H)       SEROLOGIES:  Serologies Latest Ref Rng & Units 7/28/2022   Hep A Ab, Total Negative   Positive (A)   Hep B Surface Ag Index <0.10   Hep B Surface Ag Interp Negative   Negative   Hep B Core Ab, Total Negative   Negative   Hep B Surface Ab mIU/mL 35.35   Hep B Surface Ab Interp NONREACTIVE   REACTIVE (A)   Hep C Ab 0.0 - 0.9 s/co ratio <0.1   Ferritin 26 - 388 NG/ML 96   Iron % Saturation 20 - 50 % 10 (L)   LONG, IFA  Negative   ASMCA 0 - 19 Units 5   Ceruloplasmin 19.0 - 39.0 mg/dL 25.0   Alpha-1 antitrypsin level 101 - 187 mg/dL 153       LIVER HISTOLOGY:  8/2022. Slides reviewed by MLS. CROCKER. 50-66% macrovesicualr and micovesicular steatosis, moderate inflammation, severe ballooning, Stage 3 fibrosis. JESUS ALBERTO (222). ENDOSCOPIC PROCEDURES:  Not available or performed    RADIOLOGY:  12/2019. Ultrasound of liver. Echogenic consistent with fatty liver. No liver mass lesions. No dilated bile ducts. No ascites. OTHER TESTING:  Not available or performed    FOLLOW-UP:  All of the issues listed above in the Assessment and Plan were discussed with the patient. All questions were answered. The patient expressed a clear understanding of the above. [address] [address] in 4-6 weeks for screening and enrollment into a clinical trial for treatment of CROCKER.   The patient was given a follow-up appointment for 4 months in case she decides not to enter or is excluded from entering the clinical trial.      Valma Dakins, MD  [facility] [address], [place]

## 2022-09-01 NOTE — PROGRESS NOTES
Identified pt with two pt identifiers(name and ). Reviewed record in preparation for visit and have obtained necessary documentation. Chief Complaint   Patient presents with    Fatty Liver     2  week LBX f/u      Vitals:    22 0809   BP: 107/72   Pulse: 79   Resp: 16   Temp: (!) 96.7 °F (35.9 °C)   TempSrc: Temporal   SpO2: 98%   Weight: 199 lb (90.3 kg)   Height: 5' 4\" (1.626 m)   PainSc:   0 - No pain       Health Maintenance Review: Patient reminded of \"due or due soon\" health maintenance. I have asked the patient to contact his/her primary care provider (PCP) for follow-up on his/her health maintenance. Coordination of Care Questionnaire:  :   1) Have you been to an emergency room, urgent care, or hospitalized since your last visit? If yes, where when, and reason for visit? no       2. Have seen or consulted any other health care provider since your last visit? If yes, where when, and reason for visit? NO      Patient is accompanied by self I have received verbal consent from Wilma Saunders to discuss any/all medical information while they are present in the room.

## 2022-09-16 ENCOUNTER — OFFICE VISIT (OUTPATIENT)
Dept: INTERNAL MEDICINE CLINIC | Age: 57
End: 2022-09-16
Payer: COMMERCIAL

## 2022-09-16 VITALS
RESPIRATION RATE: 16 BRPM | HEIGHT: 64 IN | HEART RATE: 98 BPM | DIASTOLIC BLOOD PRESSURE: 84 MMHG | BODY MASS INDEX: 33.63 KG/M2 | OXYGEN SATURATION: 95 % | TEMPERATURE: 98.4 F | WEIGHT: 197 LBS | SYSTOLIC BLOOD PRESSURE: 110 MMHG

## 2022-09-16 DIAGNOSIS — F41.9 ANXIETY: Primary | ICD-10-CM

## 2022-09-16 DIAGNOSIS — E78.5 HYPERLIPIDEMIA, UNSPECIFIED HYPERLIPIDEMIA TYPE: ICD-10-CM

## 2022-09-16 DIAGNOSIS — E11.9 TYPE 2 DIABETES MELLITUS WITHOUT COMPLICATION, WITHOUT LONG-TERM CURRENT USE OF INSULIN (HCC): ICD-10-CM

## 2022-09-16 DIAGNOSIS — F51.01 PRIMARY INSOMNIA: ICD-10-CM

## 2022-09-16 DIAGNOSIS — G47.00 INSOMNIA, UNSPECIFIED TYPE: ICD-10-CM

## 2022-09-16 DIAGNOSIS — K75.81 NASH (NONALCOHOLIC STEATOHEPATITIS): ICD-10-CM

## 2022-09-16 DIAGNOSIS — Z63.4 BEREAVEMENT: ICD-10-CM

## 2022-09-16 DIAGNOSIS — I10 HYPERTENSION, UNSPECIFIED TYPE: ICD-10-CM

## 2022-09-16 PROCEDURE — 3044F HG A1C LEVEL LT 7.0%: CPT | Performed by: INTERNAL MEDICINE

## 2022-09-16 PROCEDURE — 99214 OFFICE O/P EST MOD 30 MIN: CPT | Performed by: INTERNAL MEDICINE

## 2022-09-16 RX ORDER — ZOLPIDEM TARTRATE 10 MG/1
TABLET ORAL
Qty: 30 TABLET | Refills: 5 | Status: SHIPPED | OUTPATIENT
Start: 2022-09-16

## 2022-09-16 RX ORDER — SEMAGLUTIDE 1.34 MG/ML
0.25 INJECTION, SOLUTION SUBCUTANEOUS
Qty: 1 BOX | Refills: 5 | Status: SHIPPED | OUTPATIENT
Start: 2022-09-16

## 2022-09-16 RX ORDER — BUSPIRONE HYDROCHLORIDE 5 MG/1
5 TABLET ORAL 2 TIMES DAILY
Qty: 180 TABLET | Refills: 3 | Status: SHIPPED | OUTPATIENT
Start: 2022-09-16

## 2022-09-16 SDOH — SOCIAL STABILITY - SOCIAL INSECURITY: DISSAPEARANCE AND DEATH OF FAMILY MEMBER: Z63.4

## 2022-09-16 NOTE — PROGRESS NOTES
HISTORY OF PRESENT ILLNESS  Gordon Moreno is a 60 y.o. female. HPI  F/u anxiety, DM-2 ,HLD  hepatic steatosis obesity  , fibromyalgia-Dr Taya Valdovinos  Recent a1c 6.4 LDL 75 ast 65 ALT 89  Fsbs-none  Anxiety symptoms --worse since son passed recently. Not sleeping wel  Weight dowm a 12 lbs  Had recent liver bx--steatohepatitis with fibrosis -Dr Kathy Michelle. Will enroll in clinical trial. On gonzalez diet now, needs to lose 30 lbs   Due pcv 13    Her 46 yo son passed from [employer] 2 months ago--takes buspar in afteroons due to panic atacks--involved parent support group    Last OV  Last a1c 6.3 LDL 96  C/o sorethroat and gland swelling x 2 weeks--  No f/c   Intermittent cough  has initial appt at  liver inst in April for fatty liver --lost 7 lbs with diet since last OV  lexapro helps with her anxiety-rare use of buspar  fsbs around 90s    Patient Active Problem List    Diagnosis Date Noted    History of knee replacement 07/28/2022    Anxiety 01/27/2020    Pure hypercholesterolemia 07/30/2019    Severe obesity (BMI 35.0-39. 9) with comorbidity ([facility] [address] 04/12/2018    FH: colon cancer 11/01/2017    Hepatic steatosis 06/23/2016    Dislocation of prosthetic joint ([facility] [address] 07/25/2014    HLD (hyperlipidemia) 07/11/2014    Prediabetes 09/03/2013    Ischemic colitis ([facility] [address] 09/03/2013    DJD (degenerative joint disease) of knee 06/04/2013    Fibromyalgia 09/30/2011    Insomnia 09/30/2011     Current Outpatient Medications   Medication Sig Dispense Refill    semaglutide (Ozempic) 0.25 mg or 0.5 mg/dose (2 mg/1.5 ml) subq pen 0.25 mg by SubCUTAneous route every seven (7) days. 1 Box 5    busPIRone (BUSPAR) 5 mg tablet Take 1 Tablet by mouth two (2) times a day. 180 Tablet 3    zolpidem (AMBIEN) 10 mg tablet TAKE 1 TABLET BY MOUTH AT BEDTIME 30 Tablet 5    omeprazole (PRILOSEC) 40 mg capsule Take 40 mg by mouth two (2) times a day. diclofenac EC (VOLTAREN) 75 mg EC tablet Take 75 mg by mouth as needed. triamterene-hydroCHLOROthiazide (MAXZIDE) 37.5-25 mg per tablet Take 1 Tablet by mouth daily. 90 Tablet 3    escitalopram oxalate (LEXAPRO) 20 mg tablet TAKE 1 TABLET BY MOUTH EVERY DAY 90 Tablet 3    metFORMIN (GLUCOPHAGE) 500 mg tablet TAKE 2 TABS BY MOUTH TWO (2) TIMES DAILY (WITH MEALS). 360 Tablet 3    atorvastatin (LIPITOR) 10 mg tablet TAKE 1 TAB BY MOUTH DAILY. INDICATIONS: HIGH CHOLESTEROL AND HIGH TRIGLYCERIDES 90 Tablet 3    cyclobenzaprine (Flexeril) 10 mg tablet Take 1 Tab by mouth two (2) times a day. 180 Tab 1    multivitamin (ONE A DAY) tablet Take 1 Tab by mouth daily. cyanocobalamin 1,000 mcg tablet Take 1,000 mcg by mouth daily. EPINEPHrine (EPIPEN) 0.3 mg/0.3 mL injection 0.3 mL by IntraMUSCular route once as needed for 1 dose. 1 Each 1    gabapentin (NEURONTIN) 100 mg tablet Take 300 mg by mouth two (2) times a day. Allergies   Allergen Reactions    Latex Contact Dermatitis     Welts, redness, \"oozing\" per pt.     Demerol [Meperidine] Anaphylaxis    Strawberry Anaphylaxis    Hydromorphone Other (comments)     Nausea and hypotension    Lyrica [Pregabalin] Swelling     Feet and ankles and hands swelling and headaches    Morphine Other (comments)     Hypotension      Pcn [Penicillins] Hives      Lab Results   Component Value Date/Time    WBC 6.3 07/28/2022 10:04 AM    HGB 13.0 07/28/2022 10:04 AM    HCT 42.6 07/28/2022 10:04 AM    Hematocrit (POC) 41 07/27/2018 06:55 AM    PLATELET 844 50/01/9538 10:04 AM    MCV 92.6 07/28/2022 10:04 AM     Lab Results   Component Value Date/Time    Hemoglobin A1c 6.4 (H) 07/28/2022 10:04 AM    Hemoglobin A1c 6.2 (H) 03/14/2022 09:55 AM    Hemoglobin A1c 6.3 (H) 09/08/2021 10:08 AM    Glucose 98 07/28/2022 10:04 AM    Glucose (POC) 92 04/21/2021 01:49 PM    Microalbumin/Creat ratio (mg/g creat) 8 03/14/2022 09:55 AM    Microalbumin,urine random 1.38 03/14/2022 09:55 AM    LDL, calculated 75.4 07/28/2022 10:04 AM    Creatinine (POC) 0.8 07/27/2018 06:55 AM    Creatinine 0.68 07/28/2022 10:04 AM      Lab Results   Component Value Date/Time    Cholesterol, total 157 07/28/2022 10:04 AM    HDL Cholesterol 52 07/28/2022 10:04 AM    LDL, calculated 75.4 07/28/2022 10:04 AM    Triglyceride 148 07/28/2022 10:04 AM    CHOL/HDL Ratio 3.0 07/28/2022 10:04 AM     Lab Results   Component Value Date/Time    GFR est non-AA >60 07/28/2022 10:04 AM    GFRNA, POC >60 07/27/2018 06:55 AM    GFR est AA >60 07/28/2022 10:04 AM    GFRAA, POC >60 07/27/2018 06:55 AM    Creatinine 0.68 07/28/2022 10:04 AM    Creatinine (POC) 0.8 07/27/2018 06:55 AM    BUN 21 (H) 07/28/2022 10:04 AM    BUN (POC) 24 (H) 07/27/2018 06:55 AM    Sodium 143 07/28/2022 10:04 AM    Sodium (POC) 141 07/27/2018 06:55 AM    Potassium 4.4 07/28/2022 10:04 AM    Potassium (POC) 3.4 (L) 07/27/2018 06:55 AM    Chloride 103 07/28/2022 10:04 AM    Chloride (POC) 98 07/27/2018 06:55 AM    CO2 31 07/28/2022 10:04 AM        ROS    Physical Exam  Vitals and nursing note reviewed. Constitutional:       Appearance: Normal appearance. She is well-developed. She is obese. Comments: Appears stated age   Cardiovascular:      Rate and Rhythm: Normal rate and regular rhythm. Heart sounds: Normal heart sounds. No murmur heard. No friction rub. No gallop. Pulmonary:      Effort: Pulmonary effort is normal. No respiratory distress. Breath sounds: Normal breath sounds. No wheezing. Abdominal:      General: Bowel sounds are normal.      Palpations: Abdomen is soft. Neurological:      Mental Status: She is alert.       Comments:      Diabetic foot exam performed by Atul Ko MD       Measurement  Response Nurse Comment Physician Comment  Monofilament  R - normal sensation with micro filament  L - normal sensation with micro filament    Pulse DP R - 2+ (normal)  L - 2+ (normal)    Pulse TP R - 2+ (normal)  L - 2+ (normal)    Structural deformity R - None  L - None    Skin Integrity / Deformity R - None  L - None       Reviewed by:              ASSESSMENT and PLAN    ICD-10-CM ICD-9-CM    1. Anxiety  F41.9 300.00 REFERRAL TO PSYCHIATRY      2. Bereavement  Z63.4 V62.82 REFERRAL TO PSYCHIATRY  Increase buspar5 mg bid  Continue lexapro      3. Primary insomnia  F51.01 307.42 zolpidem (AMBIEN) 10 mg tablet      4. Insomnia, unspecified type  G47.00 780.52 zolpidem (AMBIEN) 10 mg tablet      5. Type 2 diabetes mellitus without complication, without long-term current use of insulin (HCC)  E11.9 250.00  DIABETES FOOT EXAM-done  Start ozempic  Lower metformin 1000 mg qpm      6. Hyperlipidemia, unspecified hyperlipidemia type  E78.5 272.4 On statin-LDL at goal      7. Hypertension, unspecified type  I10 401.9 Good control      8.  CROCKER (nonalcoholic steatohepatitis)  K75.81 571.8 [facility] inst  Weight reduction neeed  Start ozempic for DM-2 and weight reduction   Rtc 6 months CPE

## 2022-09-16 NOTE — PROGRESS NOTES
1. \"Have you been to the ER, urgent care clinic since your last visit? Hospitalized since your last visit? \" No    2. \"Have you seen or consulted any other health care providers outside of the [address] [place] [address] since your last visit? \" No     3. For patients aged 39-70: Has the patient had a colonoscopy / FIT/ Cologuard? Yes - no Care Gap present      If the patient is female:    4. For patients aged 41-77: Has the patient had a mammogram within the past 2 years? Yes - no Care Gap present      5. For patients aged 21-65: Has the patient had a pap smear?  Yes - no Care Gap present

## 2022-09-29 PROBLEM — Z80.0 FH: COLON CANCER: Status: RESOLVED | Noted: 2017-11-01 | Resolved: 2022-09-29

## 2022-09-30 ENCOUNTER — VIRTUAL VISIT (OUTPATIENT)
Dept: SOCIAL WORK | Age: 57
End: 2022-09-30
Payer: COMMERCIAL

## 2022-09-30 DIAGNOSIS — F43.23 ADJUSTMENT DISORDER WITH MIXED ANXIETY AND DEPRESSED MOOD: Primary | ICD-10-CM

## 2022-09-30 DIAGNOSIS — F43.21 GRIEF: ICD-10-CM

## 2022-09-30 PROCEDURE — 90791 PSYCH DIAGNOSTIC EVALUATION: CPT | Performed by: SOCIAL WORKER
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
## 2022-10-07 ENCOUNTER — TRANSCRIBE ORDER (OUTPATIENT)
Dept: SCHEDULING | Age: 57
End: 2022-10-07

## 2022-10-07 DIAGNOSIS — Z12.31 VISIT FOR SCREENING MAMMOGRAM: Primary | ICD-10-CM

## 2022-10-20 ENCOUNTER — VIRTUAL VISIT (OUTPATIENT)
Dept: SOCIAL WORK | Age: 57
End: 2022-10-20
Payer: COMMERCIAL

## 2022-10-20 DIAGNOSIS — F43.23 ADJUSTMENT DISORDER WITH MIXED ANXIETY AND DEPRESSED MOOD: Primary | ICD-10-CM

## 2022-10-20 DIAGNOSIS — F43.21 GRIEF: ICD-10-CM

## 2022-10-20 PROCEDURE — 90834 PSYTX W PT 45 MINUTES: CPT | Performed by: SOCIAL WORKER

## 2022-10-31 DIAGNOSIS — F41.9 ANXIETY: Primary | ICD-10-CM

## 2022-10-31 RX ORDER — ALPRAZOLAM 0.25 MG/1
0.25 TABLET ORAL
Qty: 30 TABLET | Refills: 1 | Status: SHIPPED | OUTPATIENT
Start: 2022-10-31

## 2022-11-03 ENCOUNTER — VIRTUAL VISIT (OUTPATIENT)
Dept: SOCIAL WORK | Age: 57
End: 2022-11-03
Payer: COMMERCIAL

## 2022-11-03 DIAGNOSIS — F43.23 ADJUSTMENT DISORDER WITH MIXED ANXIETY AND DEPRESSED MOOD: Primary | ICD-10-CM

## 2022-11-03 DIAGNOSIS — F43.21 GRIEF: ICD-10-CM

## 2022-11-03 PROCEDURE — 90834 PSYTX W PT 45 MINUTES: CPT | Performed by: SOCIAL WORKER

## 2022-11-03 NOTE — PROGRESS NOTES
PSYCHOTHERAPY NOTE      Rosalee Melendez is a 59 y.o. female who presents with anxiety/deprssion. Client was seen for a virtual individual psychotherapy session that lasted for 50 minutes. Progress:  Client reports having a difficult few weeks as she navigates her grief with the death of her son. She reports her anxiety was intensifying and that she was experiencing multiple \"panic attacks\" that was causing her to feel physically exhausted. She reached out to her PCP who prescribed her a low dose of xanax. Client shared that she has split that in half and has taken as needed with benefit. Client is also trying to do more of the self soothing techniques that we have discussed in previous sessions to include breathing, mindfulness and grounding techniques. Client states mood has been more affected by upcoming holidays and we processed this in session today. Shared with her that local support grief support group is offering a one day event entitled \"Surviving the Holidays\" and encouraged her to consider attending. Also shared with her some other grief resources. We also discussed establishing new traditions. Client and  are going to [place] on a trip that was planned before son passed away. She is grateful to have this trip planned as it will be a good distraction for her. Reminded client of need to focus on self as she tends to focus on her other family members      Focus today was on self care and grief support.      Mental Status exam:         Sensorium  oriented to time, place and person   Relations cooperative   Appearance:  casually dressed   Motor Behavior:  within normal limits   Speech:  normal pitch and normal volume   Thought Process: goal directed and logical   Thought Content free of delusions and free of hallucinations   Suicidal ideations none   Homicidal ideations none   Mood:  anxious, depressed, and sad   Affect:  mood-congruent   Memory recent  adequate Memory remote:  adequate   Concentration:  adequate   Abstraction:  abstract   Insight:  fair   Reliability fair   Judgment:  fair         DIAGNOSIS AND IMPRESSION:    Axis I: Adjustment Disorder with Mixed Emotional Features and Bereavement  Axis II: Deferred  Axis III:   Past Medical History:   Diagnosis Date    Adverse effect of anesthesia     Slow to wake    Arthritis     Bowel trouble     Small Bowel Obstruction    Chronic kidney disease     Kidney Mass on left kidney removed, benign    Diabetes (HCC)     pre-diabetic    GERD (gastroesophageal reflux disease)     High cholesterol     Ill-defined condition     Insomnia    Liver disease     Hepatic Steatosis    Morbid obesity (HCC)     Other ill-defined conditions(799.89)     fibromyalgia    Psychiatric disorder     Anxiety    Unspecified adverse effect of anesthesia     b/p drops and hard to wake up     Axis IV: Problems with primary support group and Problems related to social environment  Axis V:  51-60 moderate symptoms    Interventions/plans: Follow up in 2 weeks      Pursuant to the emergency declaration under the [facility] waiver authority and the [employer] and [employer] Act, this Virtual Visit was conducted, with patient and parent and/or guardian's consent, to reduce the patient's risk of exposure to COVID-19 and provide continuity of care for an established patient. Due to the state of emergency related to the coronavirus, the [place] [facility] and the [place] [facility] is allowing practitioners holding my licensure and/or certification status the ability to provide telehealth services without the usual requirements.     This individual was evaluated through a synchronous (real-time) audio-video encounter, and/or his/her healthcare decision maker, is aware that it is a billable service, which includes applicable co-pays, with coverage as determined by his/her insurance carrier. He/she provided verbal consent to proceed and patient identification was verified. This visit was conducted pursuant to the emergency declaration under the [address] [place] [address] authority and the [employer] and [employer] Act. A caregiver was present when appropriate. Ability to conduct physical exam was limited. The patient was located at home in a state where the provider was licensed to provide care.

## 2022-11-16 ENCOUNTER — HOSPITAL ENCOUNTER (OUTPATIENT)
Dept: MAMMOGRAPHY | Age: 57
Discharge: HOME | End: 2022-11-16
Attending: INTERNAL MEDICINE
Payer: COMMERCIAL

## 2022-11-16 DIAGNOSIS — Z12.31 VISIT FOR SCREENING MAMMOGRAM: ICD-10-CM

## 2022-11-16 PROCEDURE — 77067 SCR MAMMO BI INCL CAD: CPT

## 2022-11-17 ENCOUNTER — VIRTUAL VISIT (OUTPATIENT)
Dept: SOCIAL WORK | Age: 57
End: 2022-11-17
Payer: COMMERCIAL

## 2022-11-17 DIAGNOSIS — F43.21 GRIEF: ICD-10-CM

## 2022-11-17 DIAGNOSIS — F43.23 ADJUSTMENT DISORDER WITH MIXED ANXIETY AND DEPRESSED MOOD: Primary | ICD-10-CM

## 2022-11-17 PROCEDURE — 90834 PSYTX W PT 45 MINUTES: CPT | Performed by: SOCIAL WORKER

## 2022-11-17 NOTE — PROGRESS NOTES
PSYCHOTHERAPY NOTE      Edwar Phillips is a 59 y.o. female who presents with anxiety/depression. Client was seen for a virtual individual psychotherapy session that lasted for 50 minutes. Progress:  Mood is slightly improving. She and  took a trip to [place] and this was very therapeutic for them. They enjoyed their time together and it helped them as a couple as the manage their grief with the loss of their son. Client felt that the time away was very beneficial.     Client continues to navigate her grief. She struggles at times with daughter in law and we processed this in today's session. Explored ways to help her to improve their communication and to be consistent with her support. Reminded how people grieve differently. Also provided information on some local grief support groups for holiday grief that could be beneficial.     Client feels that she is being more mindful of boundaries and self care and this was commended. Overall is demonstrating progress.      Mental Status exam:         Sensorium  oriented to time, place and person   Relations cooperative   Appearance:  casually dressed   Motor Behavior:  within normal limits   Speech:  normal pitch and normal volume   Thought Process: goal directed and logical   Thought Content logical   Suicidal ideations none   Homicidal ideations none   Mood:  sad   Affect:  mood-congruent   Memory recent  adequate   Memory remote:  adequate   Concentration:  adequate   Abstraction:  abstract   Insight:  fair   Reliability fair   Judgment:  fair         DIAGNOSIS AND IMPRESSION:    Axis I: Adjustment Disorder with Mixed Emotional Features and Bereavement  Axis II: Deferred    Axis III:   Past Medical History:   Diagnosis Date    Adverse effect of anesthesia     Slow to wake    Arthritis     Bowel trouble     Small Bowel Obstruction    Chronic kidney disease     Kidney Mass on left kidney removed, benign    Diabetes [address] [address]     pre-diabetic GERD (gastroesophageal reflux disease)     High cholesterol     Ill-defined condition     Insomnia    Liver disease     Hepatic Steatosis    Morbid obesity ([facility] [address]     Other ill-defined conditions(799.89)     fibromyalgia    Psychiatric disorder     Anxiety    Unspecified adverse effect of anesthesia     b/p drops and hard to wake up     Axis IV: Problems with primary support group, Problems related to social environment, and Other psychosocial or environmental problems  Axis V:  61-70 mild symptoms    Interventions/plans: Follow up in 2 weeks      Pursuant to the emergency declaration under the [address] [place] [address] waiver authority and the [employer] Act, this Virtual Visit was conducted, with patient and parent and/or guardian's consent, to reduce the patient's risk of exposure to COVID-19 and provide continuity of care for an established patient. Due to the state of emergency related to the coronavirus, the [place] and the [place] [employer] [place] is allowing practitioners holding my licensure and/or certification status the ability to provide telehealth services without the usual requirements. This individual was evaluated through a synchronous (real-time) audio-video encounter, and/or his/her healthcare decision maker, is aware that it is a billable service, which includes applicable co-pays, with coverage as determined by his/her insurance carrier. He/she provided verbal consent to proceed and patient identification was verified. This visit was conducted pursuant to the emergency declaration under the [address] [place] [address], [address] [place] [address] [facility] authority and the [employer] Act. A caregiver was present when appropriate. Ability to conduct physical exam was limited.  The patient was located at home in a state where the provider was licensed to provide care.

## 2022-11-18 ENCOUNTER — DOCUMENTATION ONLY (OUTPATIENT)
Dept: HEMATOLOGY | Age: 57
End: 2022-11-18

## 2022-11-18 NOTE — PROGRESS NOTES
Prescreening performed for clinical trials. This case was screened for the following trials: [facility]  Stage 2-3  JESUS ALBERTO 6  Possible screen fail: It is documented that she is on Ozempic since 9/18/2022.

## 2022-12-05 ENCOUNTER — VIRTUAL VISIT (OUTPATIENT)
Dept: SOCIAL WORK | Age: 57
End: 2022-12-05
Payer: COMMERCIAL

## 2022-12-05 DIAGNOSIS — F43.23 ADJUSTMENT DISORDER WITH MIXED ANXIETY AND DEPRESSED MOOD: Primary | ICD-10-CM

## 2022-12-05 DIAGNOSIS — F43.21 GRIEF: ICD-10-CM

## 2022-12-05 PROCEDURE — 90834 PSYTX W PT 45 MINUTES: CPT | Performed by: SOCIAL WORKER

## 2022-12-05 NOTE — PROGRESS NOTES
PSYCHOTHERAPY NOTE      Martha Cottrell is a 59 y.o. female who presents with depression/anxiety/grief. Client was seen for a virtual individual psychotherapy session that lasted for 50 minutes. Progress:  Client reports having some moments of increased anxiety since last session. No known triggers except for grief of son. States she had to take her PRN xanax one day as it was so bad, but client does try to do her breathing exercises and other forms of grounding to self soothe when triggered. Client states Thanksgiving was challenging due to her sisters trying to control her daughter in law and how she should grieve. Daughter in law had a \"breakdown\" and shared with them all she is going through behind the scenes. Client feels that this was actually a good thing as she was able to set boundaries with sisters and everyone is aware of the complexity of [facility]. Since then, daughter in law has allowed client to help more including allowing her to set up their [place] tree and this has been helpful to client. Overall, client is managing her own grief well. We focused today on self care and boundaries.      Mental Status exam:         Sensorium  oriented to time, place and person   Relations cooperative   Appearance:  casually dressed   Motor Behavior:  within normal limits   Speech:  normal pitch and normal volume   Thought Process: goal directed and logical   Thought Content free of delusions and free of hallucinations   Suicidal ideations none   Homicidal ideations none   Mood:  anxious   Affect:  mood-congruent   Memory recent  adequate   Memory remote:  adequate   Concentration:  adequate   Abstraction:  abstract   Insight:  fair   Reliability fair   Judgment:  fair         DIAGNOSIS AND IMPRESSION:    Axis I: Adjustment Disorder with Mixed Emotional Features and Bereavement  Axis II: No dx  Axis III:   Past Medical History:   Diagnosis Date    Adverse effect of anesthesia Slow to wake    Arthritis     Bowel trouble     Small Bowel Obstruction    Chronic kidney disease     Kidney Mass on left kidney removed, benign    Diabetes (HCC)     pre-diabetic    GERD (gastroesophageal reflux disease)     High cholesterol     Ill-defined condition     Insomnia    Liver disease     Hepatic Steatosis    Morbid obesity ([facility] [address]     Other ill-defined conditions(799.89)     fibromyalgia    Psychiatric disorder     Anxiety    Unspecified adverse effect of anesthesia     b/p drops and hard to wake up     Axis IV: Problems with primary support group, Problems related to social environment, and Other psychosocial or environmental problems  Axis V:  61-70 mild symptoms    Interventions/plans: Follow up in 1-2 weeks      Pursuant to the emergency declaration under the [facility], [facility] waiver authority and the [employer] Act, this Virtual Visit was conducted, with patient and parent and/or guardian's consent, to reduce the patient's risk of exposure to COVID-19 and provide continuity of care for an established patient. Due to the state of emergency related to the coronavirus, the [place] [employer] [place] and the [facility] Psychology is allowing practitioners holding my licensure and/or certification status the ability to provide telehealth services without the usual requirements. This individual was evaluated through a synchronous (real-time) audio-video encounter, and/or his/her healthcare decision maker, is aware that it is a billable service, which includes applicable co-pays, with coverage as determined by his/her insurance carrier. He/she provided verbal consent to proceed and patient identification was verified.  This visit was conducted pursuant to the emergency declaration under the [address] [place] [address], [address] [place] [address] [facility] authority and the [employer] Supplemental Appropriations Act. A caregiver was present when appropriate. Ability to conduct physical exam was limited. The patient was located at home in a state where the provider was licensed to provide care.

## 2022-12-24 DIAGNOSIS — Z79.4 CONTROLLED TYPE 2 DIABETES MELLITUS WITHOUT COMPLICATION, WITH LONG-TERM CURRENT USE OF INSULIN (HCC): ICD-10-CM

## 2022-12-24 DIAGNOSIS — E11.9 CONTROLLED TYPE 2 DIABETES MELLITUS WITHOUT COMPLICATION, WITH LONG-TERM CURRENT USE OF INSULIN (HCC): ICD-10-CM

## 2022-12-25 RX ORDER — TRIAMTERENE/HYDROCHLOROTHIAZID 37.5-25 MG
TABLET ORAL
Qty: 90 TABLET | Refills: 3 | Status: SHIPPED | OUTPATIENT
Start: 2022-12-25

## 2022-12-25 RX ORDER — METFORMIN HYDROCHLORIDE 500 MG/1
TABLET ORAL
Qty: 360 TABLET | Refills: 3 | Status: SHIPPED | OUTPATIENT
Start: 2022-12-25

## 2022-12-25 RX ORDER — ESCITALOPRAM OXALATE 20 MG/1
TABLET ORAL
Qty: 90 TABLET | Refills: 3 | Status: SHIPPED | OUTPATIENT
Start: 2022-12-25

## 2022-12-25 RX ORDER — ATORVASTATIN CALCIUM 10 MG/1
10 TABLET, FILM COATED ORAL DAILY
Qty: 90 TABLET | Refills: 3 | Status: SHIPPED | OUTPATIENT
Start: 2022-12-25

## 2023-01-04 DIAGNOSIS — K30 INDIGESTION: Primary | ICD-10-CM

## 2023-01-04 RX ORDER — OMEPRAZOLE 40 MG/1
40 CAPSULE, DELAYED RELEASE ORAL 2 TIMES DAILY
Qty: 180 CAPSULE | Refills: 1 | Status: SHIPPED | OUTPATIENT
Start: 2023-01-04

## 2023-01-04 NOTE — TELEPHONE ENCOUNTER
PCP: Christine Olmos MD    Last appt: 9/16/2022  Future Appointments   Date Time Provider Law Mel[place]   1/6/2023  4:00 PM [facility]   1/16/2023 10:00 AM [facility]   3/21/2023  9:40 AM ANSELMO Dowell BS AMB   4/13/2023  9:30 AM Christine Olmos [facility] BS AMB       Requested Prescriptions     Pending Prescriptions Disp Refills    omeprazole (PRILOSEC) 40 mg capsule 180 Capsule 1     Sig: Take 1 Capsule by mouth two (2) times a day.

## 2023-01-05 RX ORDER — CEPHALEXIN 500 MG/1
2000 CAPSULE ORAL ONCE
Qty: 4 CAPSULE | Refills: 2 | Status: SHIPPED | OUTPATIENT
Start: 2023-01-05 | End: 2023-01-05

## 2023-01-06 ENCOUNTER — VIRTUAL VISIT (OUTPATIENT)
Dept: SOCIAL WORK | Age: 58
End: 2023-01-06
Payer: COMMERCIAL

## 2023-01-06 DIAGNOSIS — F43.23 ADJUSTMENT DISORDER WITH MIXED ANXIETY AND DEPRESSED MOOD: Primary | ICD-10-CM

## 2023-01-06 DIAGNOSIS — F43.21 GRIEF: ICD-10-CM

## 2023-01-07 NOTE — PROGRESS NOTES
PSYCHOTHERAPY NOTE      Jeanne German is a 59 y.o. female who presents with depression/grief. Client was seen for a virtual individual psychotherapy session that lasted for 50 minutes. Progress:  Mood affected by recent stressors. Client shared that her daughter-in-law (wife of her son who passed) decided to not observe or exchange gifts on Terrence Day and asked she and  to come the day after. They complied with her wishes; however, client states that daughter-in-law appeared impaired from her medications and abruptly went to her room while they were opening presents. A couple of days later, client states daughter in law sent texts to the family and blocked them from her phone and social media. Client states that as a result, she has not been able to see or talk with grandchildren and this has been very hard for her. Processed all of this in session today. Explored ways to try to reach out and promote communication. Focused on ways to help client manage this stressor and build up supports. Client's mother will be having surgery next week and this is another source of stress. Client is connected with [employer] and grief support group and this has been beneficial.    Focus today was on communication and self care.      Mental Status exam:         Sensorium  oriented to time, place and person   Relations cooperative   Appearance:  casually dressed   Motor Behavior:  within normal limits   Speech:  normal pitch and normal volume   Thought Process: goal directed and logical   Thought Content free of delusions and free of hallucinations   Suicidal ideations none   Homicidal ideations none   Mood:  sad   Affect:  mood-congruent   Memory recent  adequate   Memory remote:  adequate   Concentration:  adequate   Abstraction:  abstract   Insight:  fair   Reliability fair   Judgment:  fair         DIAGNOSIS AND IMPRESSION:  Axis I: Adjustment Disorder with Mixed Emotional Features and Bereavement  Axis II: No dx    Axis III:   Past Medical History:   Diagnosis Date    Adverse effect of anesthesia     Slow to wake    Arthritis     Bowel trouble     Small Bowel Obstruction    Chronic kidney disease     Kidney Mass on left kidney removed, benign    Diabetes ([facility] [address]     pre-diabetic    GERD (gastroesophageal reflux disease)     High cholesterol     Ill-defined condition     Insomnia    Liver disease     Hepatic Steatosis    Morbid obesity ([facility] [address]     Other ill-defined conditions(799.89)     fibromyalgia    Psychiatric disorder     Anxiety    Unspecified adverse effect of anesthesia     b/p drops and hard to wake up     Axis IV: Problems with primary support group, Problems related to social environment, and Other psychosocial or environmental problems  Axis V:  51-60 moderate symptoms    Interventions/plans: Follow up in 2-3 weeks      Pursuant to the emergency declaration under the [facility], [facility] waiver authority and the [employer] Act, this Virtual Visit was conducted, with patient and parent and/or guardian's consent, to reduce the patient's risk of exposure to COVID-19 and provide continuity of care for an established patient. Due to the state of emergency related to the coronavirus, the [place] and the [place] is allowing practitioners holding my licensure and/or certification status the ability to provide telehealth services without the usual requirements. This individual was evaluated through a synchronous (real-time) audio-video encounter, and/or his/her healthcare decision maker, is aware that it is a billable service, which includes applicable co-pays, with coverage as determined by his/her insurance carrier. He/she provided verbal consent to proceed and patient identification was verified.  This visit was conducted pursuant to the emergency declaration under the [facility], [address] [place] [address] waiver authority and the [employer] Act. A caregiver was present when appropriate. Ability to conduct physical exam was limited. The patient was located at home in a state where the provider was licensed to provide care.

## 2023-01-26 ENCOUNTER — VIRTUAL VISIT (OUTPATIENT)
Dept: SOCIAL WORK | Age: 58
End: 2023-01-26
Payer: COMMERCIAL

## 2023-01-26 DIAGNOSIS — F43.23 ADJUSTMENT DISORDER WITH MIXED ANXIETY AND DEPRESSED MOOD: Primary | ICD-10-CM

## 2023-01-26 DIAGNOSIS — F43.21 GRIEF: ICD-10-CM

## 2023-01-26 PROCEDURE — 90834 PSYTX W PT 45 MINUTES: CPT | Performed by: SOCIAL WORKER

## 2023-01-26 NOTE — PROGRESS NOTES
PSYCHOTHERAPY NOTE      Star Mosher is a 59 y.o. female who presents with anxiety/depression/grief. Client was seen for a virtual individual psychotherapy session that lasted for 50 minutes. Progress:  Client reports increase in anxiety and mood to be affected by current stressors. Her daughter in law continues to not allow her to see grandchildren. In addition, her other son's ex-wife is trying to get custody of their other grandchildren and this has caused a lot of distress. DSS is not involved and there is a court hearing scheduled for next week. All of this has had an impact on her mood and client reports anxiety has intensified. She is compliant with medications and also taking her PRN meds to help and this has been beneficial.      Client very distress about inability to see grandchildren and we addressed this in session today. Encouraged her to stay busy with her Faith activities and groups. She does attend morning meditation group and is involved in choir. Her rakesh is source of comfort. Focus today was on self care and improving coping skills to manage current stressors.      Mental Status exam:         Sensorium  oriented to time, place and person   Relations cooperative   Appearance:  casually dressed   Motor Behavior:  within normal limits   Speech:  normal pitch and normal volume   Thought Process: goal directed and logical   Thought Content free of delusions and free of hallucinations   Suicidal ideations none   Homicidal ideations none   Mood:  anxious and depressed   Affect:  mood-congruent   Memory recent  adequate   Memory remote:  adequate   Concentration:  adequate   Abstraction:  abstract   Insight:  fair   Reliability fair   Judgment:  fair         DIAGNOSIS AND IMPRESSION:    Axis I: Adjustment Disorder with Mixed Emotional Features and Bereavement  Axis II: No dx  Axis III:   Past Medical History:   Diagnosis Date    Adverse effect of anesthesia     Slow to wake Arthritis     Bowel trouble     Small Bowel Obstruction    Chronic kidney disease     Kidney Mass on left kidney removed, benign    Diabetes (HCC)     pre-diabetic    GERD (gastroesophageal reflux disease)     High cholesterol     Ill-defined condition     Insomnia    Liver disease     Hepatic Steatosis    Morbid obesity ([facility] [address]     Other ill-defined conditions(799.89)     fibromyalgia    Psychiatric disorder     Anxiety    Unspecified adverse effect of anesthesia     b/p drops and hard to wake up     Axis IV: Problems with primary support group, Problems related to social environment, and Other psychosocial or environmental problems  Axis V:  51-60 moderate symptoms    Interventions/plans: Follow up in one week      Pursuant to the emergency declaration under the [facility], [facility] waiver authority and the [employer] Act, this Virtual Visit was conducted, with patient and parent and/or guardian's consent, to reduce the patient's risk of exposure to COVID-19 and provide continuity of care for an established patient. Due to the state of emergency related to the coronavirus, the [place] [employer] and the [facility] Psychology is allowing practitioners holding my licensure and/or certification status the ability to provide telehealth services without the usual requirements. This individual was evaluated through a synchronous (real-time) audio-video encounter, and/or his/her healthcare decision maker, is aware that it is a billable service, which includes applicable co-pays, with coverage as determined by his/her insurance carrier. He/she provided verbal consent to proceed and patient identification was verified.  This visit was conducted pursuant to the emergency declaration under the [facility] [address] waiver authority and the [employer] Supplemental [employer] Act. A caregiver was present when appropriate. Ability to conduct physical exam was limited. The patient was located at home in a state where the provider was licensed to provide care.

## 2023-02-02 ENCOUNTER — VIRTUAL VISIT (OUTPATIENT)
Dept: SOCIAL WORK | Age: 58
End: 2023-02-02
Payer: COMMERCIAL

## 2023-02-02 DIAGNOSIS — F43.21 GRIEF: ICD-10-CM

## 2023-02-02 DIAGNOSIS — F43.23 ADJUSTMENT DISORDER WITH MIXED ANXIETY AND DEPRESSED MOOD: Primary | ICD-10-CM

## 2023-02-02 PROCEDURE — 90834 PSYTX W PT 45 MINUTES: CPT | Performed by: SOCIAL WORKER

## 2023-02-02 NOTE — PROGRESS NOTES
PSYCHOTHERAPY NOTE      Yola Doe is a 59 y.o. female who presents with grief/anxiety/depression. Client was seen for a virtual individual psychotherapy session that lasted for 50 minutes. Progress:  Mood remains sad and frustrated due to grief and issues with daughter-in-law. Client was able to see granddaughter for her birthday and was glad about that; however, daughter-in-laws mood reportedly vacillates and this affects client greatly. Processed all of this in session and explored ways to help client to be firm with boundaries and clear with communication. Explored ways to help client to find distractions and activities. Also encouraged her to consider weekly grief support group (as the one at her [employer] is only monthly)    Focused on boundaries, communication, grief support and self care.      Mental Status exam:         Sensorium  oriented to time, place and person   Relations cooperative   Appearance:  casually dressed   Motor Behavior:  within normal limits   Speech:  normal pitch and normal volume   Thought Process: goal directed and logical   Thought Content free of delusions and free of hallucinations   Suicidal ideations none   Homicidal ideations none   Mood:  sad   Affect:  mood-congruent   Memory recent  adequate   Memory remote:  adequate   Concentration:  adequate   Abstraction:  abstract   Insight:  fair   Reliability fair   Judgment:  fair         DIAGNOSIS AND IMPRESSION:    Axis I: Adjustment Disorder with Mixed Emotional Features and Bereavement  Axis II: No dx    Axis III:   Past Medical History:   Diagnosis Date    Adverse effect of anesthesia     Slow to wake    Arthritis     Bowel trouble     Small Bowel Obstruction    Chronic kidney disease     Kidney Mass on left kidney removed, benign    Diabetes ([facility] [address]     pre-diabetic    GERD (gastroesophageal reflux disease)     High cholesterol     Ill-defined condition     Insomnia    Liver disease     Hepatic Steatosis Morbid obesity ([facility] [address]     Other ill-defined conditions(799.89)     fibromyalgia    Psychiatric disorder     Anxiety    Unspecified adverse effect of anesthesia     b/p drops and hard to wake up     Axis IV: Problems with primary support group, Problems related to social environment, and Other psychosocial or environmental problems  Axis V:  61-70 mild symptoms    Interventions/plans: Follow up in 2-3 weeks      Pursuant to the emergency declaration under the [address] [place] [address] [facility] authority and the [employer] Act, this Virtual Visit was conducted, with patient and parent and/or guardian's consent, to reduce the patient's risk of exposure to COVID-19 and provide continuity of care for an established patient. Due to the state of emergency related to the coronavirus, the [place] [employer] [place] and the [facility] Psychology is allowing practitioners holding my licensure and/or certification status the ability to provide telehealth services without the usual requirements. This individual was evaluated through a synchronous (real-time) audio-video encounter, and/or his/her healthcare decision maker, is aware that it is a billable service, which includes applicable co-pays, with coverage as determined by his/her insurance carrier. He/she provided verbal consent to proceed and patient identification was verified. This visit was conducted pursuant to the emergency declaration under the [address] [place] [address] [facility] authority and the [employer] Act. A caregiver was present when appropriate. Ability to conduct physical exam was limited. The patient was located at home in a state where the provider was licensed to provide care.

## 2023-02-23 ENCOUNTER — OFFICE VISIT (OUTPATIENT)
Dept: HEMATOLOGY | Age: 58
End: 2023-02-23

## 2023-02-23 ENCOUNTER — VIRTUAL VISIT (OUTPATIENT)
Dept: SOCIAL WORK | Age: 58
End: 2023-02-23
Payer: COMMERCIAL

## 2023-02-23 VITALS
HEIGHT: 64 IN | HEART RATE: 83 BPM | OXYGEN SATURATION: 96 % | TEMPERATURE: 97.7 F | WEIGHT: 191 LBS | BODY MASS INDEX: 32.61 KG/M2 | SYSTOLIC BLOOD PRESSURE: 110 MMHG | DIASTOLIC BLOOD PRESSURE: 79 MMHG

## 2023-02-23 DIAGNOSIS — F43.23 ADJUSTMENT DISORDER WITH MIXED ANXIETY AND DEPRESSED MOOD: Primary | ICD-10-CM

## 2023-02-23 DIAGNOSIS — K75.81 NASH (NONALCOHOLIC STEATOHEPATITIS): Primary | ICD-10-CM

## 2023-02-23 DIAGNOSIS — F43.21 GRIEF: ICD-10-CM

## 2023-02-23 PROCEDURE — 90834 PSYTX W PT 45 MINUTES: CPT | Performed by: SOCIAL WORKER

## 2023-02-23 NOTE — PROGRESS NOTES
Identified pt with two pt identifiers(name and ). Reviewed record in preparation for visit and have obtained necessary documentation. Chief Complaint   Patient presents with    Other     FS 6month CROCKER follow up      Vitals:    23 1250   BP: 110/79   Pulse: 83   Temp: 97.7 °F (36.5 °C)   TempSrc: Temporal   SpO2: 96%   Weight: 191 lb (86.6 kg)   Height: 5' 4\" (1.626 m)   PainSc:   0 - No pain       Health Maintenance Review: Patient reminded of \"due or due soon\" health maintenance. I have asked the patient to contact his/her primary care provider (PCP) for follow-up on his/her health maintenance. Coordination of Care Questionnaire:  :   1) Have you been to an emergency room, urgent care, or hospitalized since your last visit? If yes, where when, and reason for visit? no       2. Have seen or consulted any other health care provider since your last visit? If yes, where when, and reason for visit? NO      Patient is accompanied by self I have received verbal consent from Callie Delong to discuss any/all medical information while they are present in the room.

## 2023-02-23 NOTE — PROGRESS NOTES
[facility], MD, FACP, [place] [place], [place]      Kameron Beal, PA-C    Zelda Torres, AGPCNP-BC   Richard Alexander, ACNPC-AG   Prudencesola Yanez, FNP-C  Melissa Hoskins, FNP-C   Humaira Ravi, AGPCNP-BC      [address]   at [address], [address]   [phone]   FAX: [phone]  [facility] [place] [facility] [place]   at [address], [address] [place] [address], [address] [place] [address] Street - [address]   [phone]   FAX: [phone]     Patient Care Team:  Josue Mccabe MD as PCP - General  Josue Mccabe MD as PCP - [facility] Empaneled Provider  Lydia Clark MD as Consulting Provider (Ophthalmology)  Petey Chen MD as Physician (General Surgery)  Danae Alfred MD as Physician (Cardiovascular Disease Physician)      Problem List  Date Reviewed: 2/2/2023            Codes Class Noted    History of knee replacement ICD-10-CM: Z96.659  ICD-9-CM: V43.65  7/28/2022        Anxiety ICD-10-CM: F41.9  ICD-9-CM: 300.00  1/27/2020        Pure hypercholesterolemia ICD-10-CM: E78.00  ICD-9-CM: 272.0  7/30/2019        Severe obesity (BMI 35.0-39. 9) with comorbidity ([facility] ICD-10-CM: E66.01  ICD-9-CM: 278.01  4/12/2018        CROCKER (nonalcoholic steatohepatitis) ICD-10-CM: K75.81  ICD-9-CM: 571.8  6/23/2016        Dislocation of prosthetic joint ([facility] ICD-10-CM: H43.387K  ICD-9-CM: 996.42  7/25/2014        HLD (hyperlipidemia) ICD-10-CM: E78.5  ICD-9-CM: 272.4  7/11/2014        Prediabetes ICD-10-CM: R73.03  ICD-9-CM: 790.29  9/3/2013        Ischemic colitis ([facility] ICD-10-CM: K55.9  ICD-9-CM: 557.9  9/3/2013        DJD (degenerative joint disease) of knee ICD-10-CM: M17.9  ICD-9-CM: 715.36  6/4/2013        Fibromyalgia ICD-10-CM: M79.7  ICD-9-CM: 729.1  9/30/2011        Insomnia ICD-10-CM: G47.00  ICD-9-CM: 780.52  9/30/2011           Josh Mccormack Yenni Villavicencio is being seen at [facility] for management of non-alcoholic steatohepatitis (CROCKER). The active problem list, all pertinent past medical history, medications, liver histology, radiologic findings and laboratory findings related to the liver disorder were reviewed and discussed with the patient. The patient is a 59 y.o.  female who was found to have elevated liver transaminases and alkaline phosphatase in 2018. The patient underwent a liver biopsy in 8/2022. This demonstrates CROCKER with stage 3 fibrosis. We plan on obtaining Fibroscan in the office today. The patient has the following symptoms which could be attributed to the liver disorder:  fatigue. The patient is not experiencing the following symptoms which are commonly seen in this liver disorder:   pain in the right side over the liver. The patient completes all daily activities without any functional limitations. Since the las office visit, she has started on [address] in 9/2022. This has resulted in 12-13# weight loss. She is tolerating this well in general and reducing CHO intake. Taking two metformin at night. Her Dm control has also been excellent and she is feeling well in general.     ASSESSMENT AND PLAN:  CROCKER  The diagnosis is based upon liver biopsy, features of metabolic syndrome, serologic studies that are negative for other causes of chronic liver disease,     A liver biopsy performed in 8/2022 demonstrates moderate steatosis, moderate inflammation, severe ballooning and stage 3 fibrosis. I have performed Fibroscan in the office today to correlate with the biopsy as a baseline and have reviewed these findings in detail with the patient. This study has shown consistent findings with the prior liver biopsy and we can use future Fibroscan as a means to track progression or regression of fibrosis over time. Liver transaminases are elevated. ALP is elevated.   Liver function is normal. The platelet count is normal.  Given that she has had significant early weight losses, I have redrawn these values in the office today. We expect to have liver enzymes improve with weight losses of ~10% and mobilization of steatosis from the liver with losses closer to 20% of initial body weight, or a target of ~165-170. There is currently no FDA approved medical treatment for fatty liver, NALFD or CROCKER. The only medical treatments for CROCKER are though clinical trials. The patient would like to participate in a clinical trial. She has been screened for studies and was excluded from one due to concomitant use of Ozempic. She may qualify for other options in the future. Counseling for diet and weight loss in patients with confirmed or suspected NAFLD  The patient was counseled regarding diet and exercise to achieve weight loss. The best diet for patients with fatty liver is one very low in carbohydrates and enriched with protein. The patient was told not to consume any food products and drinks containing fructose as this enhances hepatic fat synthesis. There is no medication or vitamin supplements that we advocate for CROCKER. Using glitazones in patients without diabetes mellitus has been shown to reduce fat content in the liver but has no effect on fibrosis and is associated with weight gain. Vitamin E has also been used but the data is not very good and most experts no longer advocate this. Screening for Hepatocellular Carcinoma  HCC screening is not necessary if the patient has no evidence of cirrhosis. Treatment of other medical problems in patients with chronic liver disease  There are no contraindications for the patient to take most medications that are necessary for treatment of other medical issues. Counseling for alcohol in patients with chronic liver disease  The patient was counseled regarding alcohol consumption and the effect of alcohol on chronic liver disease.   The patient does not consume any significant amount of alcohol. Vaccinations   Vaccination for viral hepatitis A and B is not needed. The patient has serologic evidence of prior exposure or vaccination with immunity. The patient has received 2 doses of COVID-19 vaccine. Routine vaccinations against other bacterial and viral agents can be performed as indicated. Annual flu vaccination should be administered if indicated. ALLERGIES  Allergies   Allergen Reactions    Latex Contact Dermatitis     Welts, redness, \"oozing\" per pt. Demerol [Meperidine] Anaphylaxis    Strawberry Anaphylaxis    Hydromorphone Other (comments)     Nausea and hypotension    Lyrica [Pregabalin] Swelling     Feet and ankles and hands swelling and headaches    Morphine Other (comments)     Hypotension      Pcn [Penicillins] Hives       MEDICATIONS  Current Outpatient Medications   Medication Sig    omeprazole (PRILOSEC) 40 mg capsule Take 1 Capsule by mouth two (2) times a day. triamterene-hydroCHLOROthiazide (MAXZIDE) 37.5-25 mg per tablet TAKE 1 TABLET BY MOUTH EVERY DAY    escitalopram oxalate (LEXAPRO) 20 mg tablet TAKE 1 TABLET BY MOUTH EVERY DAY    atorvastatin (LIPITOR) 10 mg tablet TAKE 1 TAB BY MOUTH DAILY. INDICATIONS: HIGH CHOLESTEROL AND HIGH TRIGLYCERIDES    metFORMIN (GLUCOPHAGE) 500 mg tablet TAKE 2 TABLETS BY MOUTH TWICE A DAY WITH MEALS    ALPRAZolam (XANAX) 0.25 mg tablet Take 1 Tablet by mouth daily as needed for Anxiety. semaglutide (Ozempic) 0.25 mg or 0.5 mg/dose (2 mg/1.5 ml) subq pen 0.25 mg by SubCUTAneous route every seven (7) days. busPIRone (BUSPAR) 5 mg tablet Take 1 Tablet by mouth two (2) times a day. zolpidem (AMBIEN) 10 mg tablet TAKE 1 TABLET BY MOUTH AT BEDTIME    diclofenac EC (VOLTAREN) 75 mg EC tablet Take 75 mg by mouth as needed. cyclobenzaprine (Flexeril) 10 mg tablet Take 1 Tab by mouth two (2) times a day. multivitamin (ONE A DAY) tablet Take 1 Tab by mouth daily. cyanocobalamin 1,000 mcg tablet Take 1,000 mcg by mouth daily. EPINEPHrine (EPIPEN) 0.3 mg/0.3 mL injection 0.3 mL by IntraMUSCular route once as needed for 1 dose. gabapentin (NEURONTIN) 100 mg tablet Take 300 mg by mouth two (2) times a day. No current facility-administered medications for this visit. SYSTEM REVIEW NOT RELATED TO LIVER DISEASE OR REVIEWED ABOVE:  Constitution systems: Negative for fever, chills. Significant for weight loss of ~13# since start of Ozempic. Eyes: Negative for visual changes. ENT: Negative for sore throat, painful swallowing. Respiratory: Negative for cough, hemoptysis, SOB. Cardiology: Negative for chest pain, palpitations. GI:  Negative for constipation or diarrhea. : Negative for urinary frequency, dysuria, hematuria, nocturia. Skin: Negative for rash. Hematology: Negative for easy bruising, blood clots. Musculo-skeletal: Negative for back pain, muscle pain, weakness. Neurologic: Negative for headaches, dizziness, vertigo, memory problems not related to HE. Psychology: Negative for anxiety, depression. FAMILY HISTORY:  The father  of dementia. The mother Has/had the following chronic disease(s): HTN, osteoporosis. There is no family history of liver disease. SOCIAL HISTORY:  The patient is . The patient has 3 children, 1 , and 9 granldchilden. The patient has never used tobacco products. The patient has never consumed significant amounts of alcohol. The patient currently works full time as pre-authorization RN for [employer]:  Visit Vitals  /79 (BP 1 Location: Right arm, BP Patient Position: Sitting, BP Cuff Size: Adult)   Pulse 83   Temp 97.7 °F (36.5 °C) (Temporal)   Ht 5' 4\" (1.626 m)   Wt 191 lb (86.6 kg)   SpO2 96%   BMI 32.79 kg/m²     General: No acute distress. Eyes: Sclera anicteric. ENT: No oral lesions. Thyroid normal.  Nodes: No adenopathy.    Skin: No spider angiomata. No jaundice. No palmar erythema. Respiratory: Lungs clear to auscultation. Cardiovascular: Regular heart rate. No murmurs. No JVD. Abdomen: Soft non-tender. Liver size normal to percussion/palpation. Spleen not palpable. No obvious ascites. Extremities: No edema. No muscle wasting. No gross arthritic changes. Neurologic: Alert and oriented. Cranial nerves grossly intact. No asterixis. LABORATORY STUDIES:  [place] [place] [address] Units 7/28/2022 3/14/2022   ANC 1.8 - 8.0 K/UL 4.0    HGB 11.5 - 16.0 g/dL 13.0 12.4    - 400 K/uL 301 287   INR 0.9 - 1.1   1.0    AST 15 - 37 U/L 65 (H) 45 (H)   ALT 12 - 78 U/L 89 (H) 72   Alk Phos 45 - 117 U/L 127 (H) 126 (H)   Bili, Total 0.2 - 1.0 MG/DL 0.4 0.3   Bili, Direct 0.0 - 0.2 MG/DL 0.2    Albumin 3.5 - 5.0 g/dL 4.2 4.0   BUN 6 - 20 MG/DL 21 (H) 29 (H)   BUN (iSTAT) 9 - 20 mg/dL     Creat 0.55 - 1.02 MG/DL 0.68 0.66   Creat (iSTAT) 0.6 - 1.3 mg/dL     Na 136 - 145 mmol/L 143 141   K 3.5 - 5.1 mmol/L 4.4 4.4   Cl 97 - 108 mmol/L 103 105   CO2 21 - 32 mmol/L 31 31   Glucose 65 - 100 mg/dL 98 110 (H)   Additional lab values drawn at today's office visit are pending at the time of documentation. SEROLOGIES:  Serologies Latest Ref Rng & Units 7/28/2022   Hep A Ab, Total Negative   Positive (A)   Hep B Surface Ag Index <0.10   Hep B Surface Ag Interp Negative   Negative   Hep B Core Ab, Total Negative   Negative   Hep B Surface Ab mIU/mL 35.35   Hep B Surface Ab Interp NONREACTIVE   REACTIVE (A)   Hep C Ab 0.0 - 0.9 s/co ratio <0.1   Ferritin 26 - 388 NG/ML 96   Iron % Saturation 20 - 50 % 10 (L)   LONG, IFA  Negative   ASMCA 0 - 19 Units 5   Ceruloplasmin 19.0 - 39.0 mg/dL 25.0   Alpha-1 antitrypsin level 101 - 187 mg/dL 153       LIVER HISTOLOGY:  8/2022. Slides reviewed by MLS. CROCKER. 50-66% macrovesicualr and micovesicular steatosis, moderate inflammation, severe ballooning, Stage 3 fibrosis. JESUS ALBERTO (222). 2/2023. [facility]. EkPa was 10.6. Suggested fibrosis level is F3. CAP score is 285, this is consistent with steatosis. ENDOSCOPIC PROCEDURES:  Not available or performed    RADIOLOGY:  12/2019. Ultrasound of liver. Echogenic consistent with fatty liver. No liver mass lesions. No dilated bile ducts. No ascites. OTHER TESTING:  Not available or performed    FOLLOW-UP:  All of the issues listed above in the Assessment and Plan were discussed with the patient. All questions were answered. The patient expressed a clear understanding of the above. [address] [place] [address] in 6 months for virtual visit to monitor response to weight loss/lifestyle changes. Will continue to screen for study enrollment if available and plan on repeat Fibroscan in 12 months. Documentation reviewed and updated to reflect current, accurate patient information.     Klaus Prado PA-C  [facility] [place] [facility], [address] [facility] [address]  22.  [phone]  [address]

## 2023-02-23 NOTE — PROGRESS NOTES
PSYCHOTHERAPY NOTE      Jose Beverly is a 59 y.o. female who presents with anxiety/depression. Client was seen for a virtual individual psychotherapy session that lasted for 50 minutes. Progress:  Mood and affect are improved and client presents as less depressed and less anxious. While things are unchanged with situation with daughter-in-law and grandchildren, she and her  have decided to try to focus more on themselves and to \"put it in God's hands\". This has enabled her to [employer] and control only what she can--which is herself. As a result, client feels mood is improving and she is less \"stuck. \"    Client did have death of one of her cousins and we processed this in session today. She is in a unique role to provide support to her aunt due to her own experience of losing a child and we addressed this in today's session. We also explored ways to re-frame cognitions to help client with family situation to build tolerance.   Follow up in 2-3 weeks    Mental Status exam:         Sensorium  oriented to time, place and person   Relations cooperative   Appearance:  casually dressed   Motor Behavior:  within normal limits   Speech:  normal pitch and normal volume   Thought Process: goal directed and logical   Thought Content free of delusions and free of hallucinations   Suicidal ideations none   Homicidal ideations none   Mood:  euthymic   Affect:  mood-congruent   Memory recent  adequate   Memory remote:  adequate   Concentration:  adequate   Abstraction:  abstract   Insight:  fair   Reliability fair   Judgment:  fair         DIAGNOSIS AND IMPRESSION:    Axis I: Adjustment Disorder with Mixed Emotional Features and Bereavement  Axis II: No dx      Axis III:   Past Medical History:   Diagnosis Date    Adverse effect of anesthesia     Slow to wake    Arthritis     Bowel trouble     Small Bowel Obstruction    Chronic kidney disease     Kidney Mass on left kidney removed, benign    Diabetes ([facility]     pre-diabetic    GERD (gastroesophageal reflux disease)     High cholesterol     Ill-defined condition     Insomnia    Liver disease     Hepatic Steatosis    Morbid obesity ([facility]     Other ill-defined conditions(799.89)     fibromyalgia    Psychiatric disorder     Anxiety    Unspecified adverse effect of anesthesia     b/p drops and hard to wake up     Axis IV: Problems with primary support group, Problems related to social environment, and Other psychosocial or environmental problems  Axis V:  61-70 mild symptoms    Interventions/plans: Follow up in 2-3 weeks        Pursuant to the emergency declaration under the [address] [place] [address] authority and the [employer] Act, this Virtual Visit was conducted, with patient and parent and/or guardian's consent, to reduce the patient's risk of exposure to COVID-19 and provide continuity of care for an established patient. Due to the state of emergency related to the coronavirus, the [place] of Social [employer] and the [facility] Psychology is allowing practitioners holding my licensure and/or certification status the ability to provide telehealth services without the usual requirements. This individual was evaluated through a synchronous (real-time) audio-video encounter, and/or his/her healthcare decision maker, is aware that it is a billable service, which includes applicable co-pays, with coverage as determined by his/her insurance carrier. He/she provided verbal consent to proceed and patient identification was verified. This visit was conducted pursuant to the emergency declaration under the [address] [place] [address], [address] [place] [address] authority and the [employer] Act. A caregiver was present when appropriate. Ability to conduct physical exam was limited.  The patient was located at home in a state where the provider was licensed to provide care.

## 2023-02-24 LAB
ALBUMIN SERPL-MCNC: 4.4 G/DL (ref 3.5–5)
ALBUMIN/GLOB SERPL: 1.3 (ref 1.1–2.2)
ALP SERPL-CCNC: 134 U/L (ref 45–117)
ALT SERPL-CCNC: 38 U/L (ref 12–78)
ANION GAP SERPL CALC-SCNC: 6 MMOL/L (ref 5–15)
AST SERPL-CCNC: 24 U/L (ref 15–37)
BILIRUB DIRECT SERPL-MCNC: 0.1 MG/DL (ref 0–0.2)
BILIRUB SERPL-MCNC: 0.4 MG/DL (ref 0.2–1)
BUN SERPL-MCNC: 15 MG/DL (ref 6–20)
BUN/CREAT SERPL: 22 (ref 12–20)
CALCIUM SERPL-MCNC: 9.5 MG/DL (ref 8.5–10.1)
CHLORIDE SERPL-SCNC: 102 MMOL/L (ref 97–108)
CO2 SERPL-SCNC: 32 MMOL/L (ref 21–32)
CREAT SERPL-MCNC: 0.69 MG/DL (ref 0.55–1.02)
ERYTHROCYTE [DISTWIDTH] IN BLOOD BY AUTOMATED COUNT: 14.6 % (ref 11.5–14.5)
GLOBULIN SER CALC-MCNC: 3.3 G/DL (ref 2–4)
GLUCOSE SERPL-MCNC: 78 MG/DL (ref 65–100)
HCT VFR BLD AUTO: 42.1 % (ref 35–47)
HGB BLD-MCNC: 13 G/DL (ref 11.5–16)
MCH RBC QN AUTO: 27.7 PG (ref 26–34)
MCHC RBC AUTO-ENTMCNC: 30.9 G/DL (ref 30–36.5)
MCV RBC AUTO: 89.6 FL (ref 80–99)
NRBC # BLD: 0 K/UL (ref 0–0.01)
NRBC BLD-RTO: 0 PER 100 WBC
PLATELET # BLD AUTO: 327 K/UL (ref 150–400)
PMV BLD AUTO: 11.6 FL (ref 8.9–12.9)
POTASSIUM SERPL-SCNC: 4.2 MMOL/L (ref 3.5–5.1)
PROT SERPL-MCNC: 7.7 G/DL (ref 6.4–8.2)
RBC # BLD AUTO: 4.7 M/UL (ref 3.8–5.2)
SODIUM SERPL-SCNC: 140 MMOL/L (ref 136–145)
WBC # BLD AUTO: 8 K/UL (ref 3.6–11)

## 2023-02-24 NOTE — PROGRESS NOTES
Pt notified of normalization of liver enzymes with weight losses, continue with Ozempic, we will continue to follow and trend mild elevation in ALP as well. Follow-up 6 months  as planned.

## 2023-03-01 NOTE — PROGRESS NOTES
PSYCHOTHERAPY NOTE      Martha Cottrell is a 59 y.o. female who presents with anxiety/depression. Client was seen for a virtual individual psychotherapy session that lasted for 50 minutes. Progress:  Mood is anxious and sad as client continues to navigate death of son. Client feels that the last two days have been very difficult and her body has been very tense. After processing this we discovered that she could be responding to stress from other factors. She shared that she has been concerned about younger son as well as anticipating celebrating birthday of grandson who is son of her own son who passed away. It is a reminder that Roberta Archuleta should be there\" and the stress of it all has affected both her body and her mood. We processed all of this in session and explored ways for client to manage stress and self soothe. She has been practicing her breathing exercises and this has been helpful      Provided client with various resources and information on local grief support groups. We addressed the various stages of grief and its impact. We also discussed triggering events (birthdays, holidays, etc)     Mood mostly affected by grief and that was focus of today's session.      Mental Status exam:         Sensorium  oriented to time, place and person   Relations cooperative   Appearance:  age appropriate and casually dressed   Motor Behavior:  within normal limits   Speech:  normal pitch and normal volume   Thought Process: goal directed and logical   Thought Content free of delusions and free of hallucinations   Suicidal ideations none   Homicidal ideations none   Mood:  anxious and sad   Affect:  mood-congruent   Memory recent  adequate   Memory remote:  adequate   Concentration:  adequate   Abstraction:  abstract   Insight:  fair   Reliability fair   Judgment:  fair         DIAGNOSIS AND IMPRESSION:    Axis I: Adjustment Disorder with Mixed Emotional Features and Bereavement  Axis II: Deferred    Axis III:   Past Medical History:   Diagnosis Date    Adverse effect of anesthesia     Slow to wake    Arthritis     Bowel trouble     Small Bowel Obstruction    Chronic kidney disease     Kidney Mass on left kidney removed, benign    Diabetes (HCC)     pre-diabetic    GERD (gastroesophageal reflux disease)     High cholesterol     Ill-defined condition     Insomnia    Liver disease     Hepatic Steatosis    Morbid obesity ([facility]     Other ill-defined conditions(799.89)     fibromyalgia    Psychiatric disorder     Anxiety    Unspecified adverse effect of anesthesia     b/p drops and hard to wake up     Axis IV: Problems with primary support group, Problems related to social environment, and Other psychosocial or environmental problems  Axis V:  61-70 mild symptoms    Interventions/plans: Follow up in 2 weeks      Pursuant to the emergency declaration under the [facility] waiver authority and the [employer] Act, this Virtual Visit was conducted, with patient and parent and/or guardian's consent, to reduce the patient's risk of exposure to COVID-19 and provide continuity of care for an established patient. Due to the state of emergency related to the coronavirus, the [place] and the [place] of [employer] is allowing practitioners holding my licensure and/or certification status the ability to provide telehealth services without the usual requirements. This individual was evaluated through a synchronous (real-time) audio-video encounter, and/or his/her healthcare decision maker, is aware that it is a billable service, which includes applicable co-pays, with coverage as determined by his/her insurance carrier. He/she provided verbal consent to proceed and patient identification was verified.  This visit was conducted pursuant to the emergency declaration under the [address] Emergencies Act, [facility] waiver authority and the Coronavirus Preparedness and Response Supplemental Appropriations Act. A caregiver was present when appropriate. Ability to conduct physical exam was limited. The patient was located at home in a state where the provider was licensed to provide care. palpitations

## 2023-03-07 RX ORDER — OMEPRAZOLE 20 MG/1
CAPSULE, DELAYED RELEASE ORAL
Qty: 90 CAPSULE | Refills: 3 | Status: SHIPPED | OUTPATIENT
Start: 2023-03-07

## 2023-03-13 ENCOUNTER — VIRTUAL VISIT (OUTPATIENT)
Dept: SOCIAL WORK | Age: 58
End: 2023-03-13

## 2023-03-13 DIAGNOSIS — F43.21 GRIEF: ICD-10-CM

## 2023-03-13 DIAGNOSIS — F43.23 ADJUSTMENT DISORDER WITH MIXED ANXIETY AND DEPRESSED MOOD: Primary | ICD-10-CM

## 2023-03-13 NOTE — PROGRESS NOTES
PSYCHOTHERAPY NOTE      Daya Munoz is a 59 y.o. female who presents with anxiety/depression/grief. Client was seen for a virtual individual psychotherapy session that lasted for 50 minutes. Progress:  Mood is affected by situational stressors and client presents as more anxious/sad. Client states that last Thursday, she got a call from her daughter in law to  the grandchildren. She and  picked up the grandchildren with exception of oldest grandson who was not in home and daughter in law reportedly refused to tell her where he was at. Client states she had rest of her grandchildren over weekend but the boys went back home Sunday due to  school. Her granddaughter remains at her home; however, daughter in law has indicated that they will be moving and this is creating distress for client. Processed all of this in session. Reframed and helped client to keep focused that she has allowed her to see grandchildren and to remain focused on bigger picture. Encouraged her to allow  to communicate with daughter in law if it become too much for her. Explored ways to help client maintain boundaries and self care and this was focus of session.      Mental Status exam:         Sensorium  oriented to time, place and person   Relations cooperative   Appearance:  casually dressed   Motor Behavior:  within normal limits   Speech:  normal pitch and normal volume   Thought Process: goal directed and logical   Thought Content free of delusions and free of hallucinations   Suicidal ideations none   Homicidal ideations none   Mood:  anxious   Affect:  anxious   Memory recent  adequate   Memory remote:  adequate   Concentration:  adequate   Abstraction:  abstract   Insight:  fair   Reliability fair   Judgment:  fair         DIAGNOSIS AND IMPRESSION:    Axis I: Adjustment Disorder with Mixed Emotional Features and Bereavement  Axis II: No dx    Axis III:   Past Medical History:   Diagnosis Date    Adverse effect of anesthesia     Slow to wake    Arthritis     Bowel trouble     Small Bowel Obstruction    Chronic kidney disease     Kidney Mass on left kidney removed, benign    Diabetes (HCC)     pre-diabetic    GERD (gastroesophageal reflux disease)     High cholesterol     Ill-defined condition     Insomnia    Liver disease     Hepatic Steatosis    Morbid obesity ([facility] [address]     Other ill-defined conditions(799.89)     fibromyalgia    Psychiatric disorder     Anxiety    Unspecified adverse effect of anesthesia     b/p drops and hard to wake up     Axis IV: Problems with primary support group, Problems related to social environment, and Other psychosocial or environmental problems  Axis V:  61-70 mild symptoms    Interventions/plans: Follow up in 2 weeks      Pursuant to the emergency declaration under the [facility] waiver authority and the [employer] Act, this Virtual Visit was conducted, with patient and parent and/or guardian's consent, to reduce the patient's risk of exposure to COVID-19 and provide continuity of care for an established patient. Due to the state of emergency related to the coronavirus, the [place] [employer] [place] and the [facility] Psychology is allowing practitioners holding my licensure and/or certification status the ability to provide telehealth services without the usual requirements. This individual was evaluated through a synchronous (real-time) audio-video encounter, and/or his/her healthcare decision maker, is aware that it is a billable service, which includes applicable co-pays, with coverage as determined by his/her insurance carrier. He/she provided verbal consent to proceed and patient identification was verified.  This visit was conducted pursuant to the emergency declaration under the [facility], [facility] waiver authority and the [employer] and [employer] General Act. A caregiver was present when appropriate. Ability to conduct physical exam was limited. The patient was located at home in a state where the provider was licensed to provide care.

## 2023-03-17 DIAGNOSIS — G47.00 INSOMNIA, UNSPECIFIED TYPE: ICD-10-CM

## 2023-03-17 DIAGNOSIS — F51.01 PRIMARY INSOMNIA: ICD-10-CM

## 2023-03-17 RX ORDER — ZOLPIDEM TARTRATE 10 MG/1
TABLET ORAL
Qty: 30 TABLET | Refills: 5 | Status: SHIPPED | OUTPATIENT
Start: 2023-03-17

## 2023-03-17 NOTE — TELEPHONE ENCOUNTER
Pt advised of 48/72 hour turn around on refills, but will do our best to fill as pt has been out for a week. Pt states she has only slept about seven hours this week. She states that pharm told her they requested last week. Can this be done today?

## 2023-03-27 ENCOUNTER — VIRTUAL VISIT (OUTPATIENT)
Dept: SOCIAL WORK | Age: 58
End: 2023-03-27
Payer: COMMERCIAL

## 2023-03-27 DIAGNOSIS — F43.21 GRIEF: ICD-10-CM

## 2023-03-27 DIAGNOSIS — F43.23 ADJUSTMENT DISORDER WITH MIXED ANXIETY AND DEPRESSED MOOD: Primary | ICD-10-CM

## 2023-03-27 PROCEDURE — 90834 PSYTX W PT 45 MINUTES: CPT | Performed by: SOCIAL WORKER

## 2023-03-27 NOTE — PROGRESS NOTES
PSYCHOTHERAPY NOTE      Laura Gamez is a 59 y.o. female who presents with anxiety/depression. Client was seen for a virtual individual psychotherapy session that lasted for 50 minutes. Progress:  Mood is noted to be sad. Client shared that she has not heard from her daughter in law or grandchildren in a couple of weeks and to her knowledge they have moved. Prior to moving, she states eldest grandson called her and told her they did not want to see her anymore and daughter in law wrote her \"an ugly text message\". It was very hurtful to client and we processed all of this in session. Client also upset as her Baptism extended a lot for daughter in law and grandchildren and it upsets her that they were not more grateful. Also processed this in session and reminded her that her efforts to help through her Baptism was to honor her  son and help with grandchildren. We explored ways to client to focus more on self and her needs. She is concerned about upcoming  holiday as this was last time they were together with her son before his death. We addressed her grief and triggers and focused on ways to promote self care.  is very supportive. Focus today was on grief support, self care and boundaries.      Mental Status exam:         Sensorium  oriented to time, place and person   Relations cooperative   Appearance:  casually dressed   Motor Behavior:  within normal limits   Speech:  normal pitch and normal volume   Thought Process: goal directed and logical   Thought Content free of delusions and free of hallucinations   Suicidal ideations none   Homicidal ideations none   Mood:  sad   Affect:  mood-congruent   Memory recent  adequate   Memory remote:  adequate   Concentration:  adequate   Abstraction:  abstract   Insight:  fair   Reliability fair   Judgment:  fair         DIAGNOSIS AND IMPRESSION:    Axis I: Adjustment Disorder with Mixed Emotional Features and Bereavement  Axis II: No dx    Axis III:   Past Medical History:   Diagnosis Date    Adverse effect of anesthesia     Slow to wake    Arthritis     Bowel trouble     Small Bowel Obstruction    Chronic kidney disease     Kidney Mass on left kidney removed, benign    Diabetes ([facility] [address]     pre-diabetic    GERD (gastroesophageal reflux disease)     High cholesterol     Ill-defined condition     Insomnia    Liver disease     Hepatic Steatosis    Morbid obesity ([facility] [address]     Other ill-defined conditions(799.89)     fibromyalgia    Psychiatric disorder     Anxiety    Unspecified adverse effect of anesthesia     b/p drops and hard to wake up     Axis IV: Problems with primary support group, Problems related to social environment, and Other psychosocial or environmental problems  Axis V:  61-70 mild symptoms    Interventions/plans: Follow up in 2 weeks      Pursuant to the emergency declaration under the [facility] [address] waiver authority and the [employer] Act, this Virtual Visit was conducted, with patient and parent and/or guardian's consent, to reduce the patient's risk of exposure to COVID-19 and provide continuity of care for an established patient. Due to the state of emergency related to the coronavirus, the [place] [employer] [place] and the [place] [employer] is allowing practitioners holding my licensure and/or certification status the ability to provide telehealth services without the usual requirements. This individual was evaluated through a synchronous (real-time) audio-video encounter, and/or his/her healthcare decision maker, is aware that it is a billable service, which includes applicable co-pays, with coverage as determined by his/her insurance carrier. He/she provided verbal consent to proceed and patient identification was verified.  This visit was conducted pursuant to the emergency declaration under the [address] [place] [address] authority and the [employer] and [employer] General Act. A caregiver was present when appropriate. Ability to conduct physical exam was limited. The patient was located at home in a state where the provider was licensed to provide care.

## 2023-05-11 ENCOUNTER — TELEMEDICINE (OUTPATIENT)
Age: 58
End: 2023-05-11

## 2023-05-11 DIAGNOSIS — F43.23 ADJUSTMENT DISORDER WITH MIXED ANXIETY AND DEPRESSED MOOD: Primary | ICD-10-CM

## 2023-05-11 DIAGNOSIS — F43.21 GRIEF: ICD-10-CM

## 2023-05-11 NOTE — PROGRESS NOTES
Memory remote:  adequate   Concentration:  adequate   Abstraction:  abstract   Insight:  fair   Reliability fair   Judgment:  fair         DIAGNOSIS AND IMPRESSION:      Axis I: Adjustment Disorder with Mixed Emotional Features and Bereavement  Axis II: Deferred  Axis III:   Past Medical History:   Diagnosis Date    Adverse effect of anesthesia     Slow to wake    Arthritis     Bowel trouble     Small Bowel Obstruction    Chronic kidney disease     Kidney Mass on left kidney removed, benign    Diabetes (HCC)     pre-diabetic    GERD (gastroesophageal reflux disease)     High cholesterol     Ill-defined condition     Insomnia    Liver disease     Hepatic Steatosis    Morbid obesity (HCC)     Other ill-defined conditions(799.89)     fibromyalgia    Psychiatric disorder     Anxiety    Unspecified adverse effect of anesthesia     b/p drops and hard to wake up     Axis IV: Problems with primary support group, Problems related to social environment, and Other psychosocial or environmental problems  Axis V:  51-60 moderate symptoms    Interventions/plans: Follow up in 2 weeks      Pursuant to the emergency declaration under the [facility], [facility] waiver authority and the [employer] and [employer] Act, this Virtual Visit was conducted, with patient and parent and/or guardian's consent, to reduce the patient's risk of exposure to COVID-19 and provide continuity of care for an established patient. Due to the state of emergency related to the coronavirus, the [place] and the [place] [employer] is allowing practitioners holding my licensure and/or certification status the ability to provide telehealth services without the usual requirements.     This individual was evaluated through a synchronous (real-time) audio-video encounter, and/or his/her healthcare decision maker, is aware that it is a billable service, which

## 2023-05-25 ENCOUNTER — TELEMEDICINE (OUTPATIENT)
Age: 58
End: 2023-05-25

## 2023-05-25 DIAGNOSIS — F43.23 ADJUSTMENT DISORDER WITH MIXED ANXIETY AND DEPRESSED MOOD: Primary | ICD-10-CM

## 2023-05-25 DIAGNOSIS — F43.21 GRIEF: ICD-10-CM

## 2023-05-25 NOTE — PROGRESS NOTES
Bowel trouble     Small Bowel Obstruction    Chronic kidney disease     Kidney Mass on left kidney removed, benign    Diabetes (HCC)     pre-diabetic    GERD (gastroesophageal reflux disease)     High cholesterol     Ill-defined condition     Insomnia    Liver disease     Hepatic Steatosis    Morbid obesity ([facility] [address]     Other ill-defined conditions(799.89)     fibromyalgia    Psychiatric disorder     Anxiety    Unspecified adverse effect of anesthesia     b/p drops and hard to wake up     Axis IV: Problems with primary support group, Problems related to social environment, and Other psychosocial or environmental problems  Axis V:  61-70 mild symptoms    Interventions/plans: Follow up in 3 weeks      Pursuant to the emergency declaration under the [address] [place] [address], [facility] waiver authority and the [employer] and [employer] Act, this Virtual Visit was conducted, with patient and parent and/or guardian's consent, to reduce the patient's risk of exposure to COVID-19 and provide continuity of care for an established patient. Due to the state of emergency related to the coronavirus, the [place] and the [facility] Psychology is allowing practitioners holding my licensure and/or certification status the ability to provide telehealth services without the usual requirements. This individual was evaluated through a synchronous (real-time) audio-video encounter, and/or his/her healthcare decision maker, is aware that it is a billable service, which includes applicable co-pays, with coverage as determined by his/her insurance carrier. He/she provided verbal consent to proceed and patient identification was verified.  This visit was conducted pursuant to the emergency declaration under the [address] [place] [address], [address] [place] [address] authority and the [employer]

## 2023-06-30 ENCOUNTER — TELEPHONE (OUTPATIENT)
Age: 58
End: 2023-06-30

## 2023-07-05 ENCOUNTER — PATIENT MESSAGE (OUTPATIENT)
Age: 58
End: 2023-07-05

## 2023-07-06 ENCOUNTER — TELEMEDICINE (OUTPATIENT)
Age: 58
End: 2023-07-06

## 2023-07-06 DIAGNOSIS — F43.23 ADJUSTMENT DISORDER WITH MIXED ANXIETY AND DEPRESSED MOOD: ICD-10-CM

## 2023-07-06 DIAGNOSIS — F43.21 GRIEF: Primary | ICD-10-CM

## 2023-07-06 NOTE — PROGRESS NOTES
Met briefly with pt for transitional purposes  She has established on going and long term care with [facility]. She sees a psychiatric provider there for her medications and next week will begin to see a therapist to continue to work on her grief related to loss of son. The one year anniversary of his death was this past weekend and she and family made a memorial to him. It was very emotional and difficult weekend and we processed this today   She also shared that she will be seeing her grandchildren tonight and is very excited about it. She has made some progress, but it is recommended that she have ongoing therapy and will receive this through [facility]. Will inform referring provider.

## 2023-07-24 NOTE — TELEPHONE ENCOUNTER
From: Kathy Maher  To: Daniel Le  Sent: 7/5/2023 9:40 AM EDT  Subject: Virtual Visit 7/6/2023    Good morning. I am checking to see if you would be able to switch your appointment time with Ever Chandler to 1:00 tomorrow.

## 2023-08-24 ENCOUNTER — TELEMEDICINE (OUTPATIENT)
Age: 58
End: 2023-08-24
Payer: COMMERCIAL

## 2023-08-24 DIAGNOSIS — K75.81 NONALCOHOLIC STEATOHEPATITIS (NASH): Primary | ICD-10-CM

## 2023-08-24 PROCEDURE — 99213 OFFICE O/P EST LOW 20 MIN: CPT | Performed by: PHYSICIAN ASSISTANT

## 2023-08-24 NOTE — PROGRESS NOTES
MD Florence, FACP, [place], [place]      Chari Mcneil, ELISABETH Roth, AGPCNP-BC   Olvin Rodriguez, [facility] Center[facility]AG   Thony Grimm, FNP-C  [facility], FNP-C   Mihircinthia Strauss, AGPCNP-BC      [address] [address], [place]   at [facility]   [address], [facility], [address]   [phone]   FAX: [phone]  Liver M[facility], [address], [address]   [phone]   FAX: [phone]     Patient Care Team:  Fabiano Steele MD as PCP - Clair Navarro MD as PCP - Empaneled Provider  Aspen Rao MD as Consulting Physician  Ramin Bess MD as Physician  Maurice Willis MD as Physician    Patient Active Problem List   Diagnosis    Pure hypercholesterolemia    Anxiety    HLD (hyperlipidemia)    Dislocation of prosthetic joint ([address])    Prediabetes    DJD (degenerative joint disease) of knee    Fibromyalgia    Insomnia    Severe obesity (BMI 35.0-39. 9) with comorbidity (HCC)    Ischemic colitis ([address])    History of knee replacement    BUCKLEY (nonalcoholic steatohepatitis)     VIRTUAL TELEHEALTH VISIT PERFORMED DUE TO COVID-19 EPIDEMIC    CONSENT:  Dennie Edson, who was evaluated through a synchronous (real-time) audio-video encounter, and/or his healthcare decision maker, is aware that it is a billable service, which includes applicable co-pays, with coverage as determined by his insurance carrier. He provided verbal consent to proceed and patient identification was verified. This visit was conducted pursuant to the emergency declaration under the [address] waiver authority and the [employer] and [employer] Act. A caregiver was present when appropriate. Ability to conduct physical exam was limited.  The

## 2023-08-24 NOTE — PROGRESS NOTES
Identified pt with two pt identifiers(name and ). Reviewed record in preparation for visit and have obtained necessary documentation. Chief Complaint   Patient presents with    Follow-up     There were no vitals taken for this visit. 1. \"Have you been to the ER, urgent care clinic since your last visit? Hospitalized since your last visit? \" No    2. \"Have you seen or consulted any other health care providers outside of the [address] [place] [address] since your last visit? \" Yes per pt for 6 month check up    Patient is accompanied by self I have received verbal consent from [facility] to discuss any/all medical information while they are present in the room.

## 2023-10-02 RX ORDER — SEMAGLUTIDE 0.68 MG/ML
INJECTION, SOLUTION SUBCUTANEOUS
Qty: 3 ML | Refills: 5 | Status: SHIPPED | OUTPATIENT
Start: 2023-10-02

## 2023-10-18 ENCOUNTER — TELEMEDICINE (OUTPATIENT)
Age: 58
End: 2023-10-18
Payer: COMMERCIAL

## 2023-10-18 DIAGNOSIS — M79.7 FIBROMYALGIA: ICD-10-CM

## 2023-10-18 DIAGNOSIS — B02.9 HERPES ZOSTER WITHOUT COMPLICATION: Primary | ICD-10-CM

## 2023-10-18 PROCEDURE — 99213 OFFICE O/P EST LOW 20 MIN: CPT | Performed by: INTERNAL MEDICINE

## 2023-10-18 RX ORDER — VALACYCLOVIR HYDROCHLORIDE 1 G/1
1000 TABLET, FILM COATED ORAL 3 TIMES DAILY
Qty: 21 TABLET | Refills: 0 | Status: SHIPPED | OUTPATIENT
Start: 2023-10-18 | End: 2023-10-25

## 2023-10-18 ASSESSMENT — ENCOUNTER SYMPTOMS: SHORTNESS OF BREATH: 0

## 2023-10-18 NOTE — PROGRESS NOTES
breath. Cardiovascular:  Negative for chest pain. Physical Exam  Constitutional:       General: She is not in acute distress. Appearance: Normal appearance. HENT:      Head: Normocephalic and atraumatic. Eyes:      General:         Right eye: No discharge. Left eye: No discharge. Pulmonary:      Effort: Pulmonary effort is normal. No respiratory distress. Neurological:      Mental Status: She is alert and oriented to person, place, and time. Mental status is at baseline. Psychiatric:         Mood and Affect: Mood normal.       Mil Erythema in left arm      ASSESSMENT and PLAN   Diagnosis Orders   1. Herpes zoster without complication     Atypical presentation but similar to what she had in the past    We will give Valtrex 3 times daily for 7 days as her symptoms just started in the last 24 hours    Discussed she can increase her gabapentin to help with the burning pain currently takes gabapentin 300 mg twice daily can increase to 3 times daily dosing    Of note she has a follow-up next week with her PCP and can have the rash looked at more closely than       2. Fibromyalgia     Improved with gabapentin sees rheumatology can take 3/day while having increased nerve pain from shingles       Depression screen reviewed and negative. Scribed by Popeye Bernard of [address] [place] [address], as dictated by Dr. Mariana Huynh. Current diagnosis and concerns discussed with pt at length. Pt understands risks and benefits or current treatment plan and medications, and accepts the treatment and medication with any possible risks. Pt asks appropriate questions, which were answered. Pt was instructed to call with any concerns or problems. I have reviewed the note documented by the scribe. The services provided are my own. The documentation is accurate. Darnlel Orellana, was evaluated through a synchronous (real-time) audio-video encounter.  The patient (or guardian if applicable) is aware that this is a

## 2023-10-25 NOTE — PROGRESS NOTES
HISTORY OF PRESENT ILLNESS   Millicent Steele   is a 59 y.o.  female. 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 9) with comorbidity ([address]) 04/12/2018    BUCKLEY (nonalcoholic steatohepatitis) 06/23/2016    Dislocation of prosthetic joint ([address]) 07/25/2014    HLD (hyperlipidemia) 07/11/2014    Prediabetes 09/03/2013    Ischemic colitis ([address]) 09/03/2013    DJD (degenerative joint disease) of knee 06/04/2013    Fibromyalgia 09/30/2011    Insomnia 09/30/2011     Current Outpatient Medications   Medication Sig Dispense Refill    valACYclovir (VALTREX) 1 g tablet Take 1 tablet by mouth 3 times daily for 7 days 21 tablet 0    valACYclovir (VALTREX) 1 g tablet Take 1 tablet by mouth 3 times daily for 7 days 21 tablet 0    OZEMPIC, 0.25 OR 0.5 MG/DOSE, 2 MG/3ML SOPN INJECT 0.25 MG BY SUBCUTANEOUS ROUTE EVERY SEVEN (7) DAYS. 3 mL 5    ALPRAZolam (XANAX) 0.25 MG tablet Take by mouth daily as needed.       atorvastatin (LIPITOR) 10 MG tablet Take by mouth daily      busPIRone (BUSPAR) 5 MG tablet Take by mouth 2 times daily      cyanocobalamin 1000 MCG tablet Take by mouth daily      cyclobenzaprine (FLEXERIL) 10 MG tablet Take by

## 2023-10-26 ENCOUNTER — OFFICE VISIT (OUTPATIENT)
Age: 58
End: 2023-10-26
Payer: COMMERCIAL

## 2023-10-26 VITALS
HEIGHT: 64 IN | TEMPERATURE: 97.1 F | OXYGEN SATURATION: 98 % | WEIGHT: 192.8 LBS | DIASTOLIC BLOOD PRESSURE: 80 MMHG | BODY MASS INDEX: 32.91 KG/M2 | HEART RATE: 82 BPM | SYSTOLIC BLOOD PRESSURE: 116 MMHG | RESPIRATION RATE: 16 BRPM

## 2023-10-26 DIAGNOSIS — E78.5 HYPERLIPIDEMIA, UNSPECIFIED HYPERLIPIDEMIA TYPE: ICD-10-CM

## 2023-10-26 DIAGNOSIS — K76.0 HEPATIC STEATOSIS: ICD-10-CM

## 2023-10-26 DIAGNOSIS — E11.9 TYPE 2 DIABETES MELLITUS WITHOUT COMPLICATION, WITHOUT LONG-TERM CURRENT USE OF INSULIN (HCC): Primary | ICD-10-CM

## 2023-10-26 DIAGNOSIS — Z11.4 ENCOUNTER FOR SCREENING FOR HIV: ICD-10-CM

## 2023-10-26 LAB
ALBUMIN SERPL-MCNC: 4.4 G/DL (ref 3.5–5)
ALBUMIN/GLOB SERPL: 1.2 (ref 1.1–2.2)
ALP SERPL-CCNC: 142 U/L (ref 45–117)
ALT SERPL-CCNC: 43 U/L (ref 12–78)
ANION GAP SERPL CALC-SCNC: 5 MMOL/L (ref 5–15)
AST SERPL-CCNC: 25 U/L (ref 15–37)
BILIRUB DIRECT SERPL-MCNC: 0.1 MG/DL (ref 0–0.2)
BILIRUB SERPL-MCNC: 0.5 MG/DL (ref 0.2–1)
BUN SERPL-MCNC: 16 MG/DL (ref 6–20)
BUN/CREAT SERPL: 21 (ref 12–20)
CALCIUM SERPL-MCNC: 9.8 MG/DL (ref 8.5–10.1)
CHLORIDE SERPL-SCNC: 100 MMOL/L (ref 97–108)
CO2 SERPL-SCNC: 33 MMOL/L (ref 21–32)
CREAT SERPL-MCNC: 0.78 MG/DL (ref 0.55–1.02)
ERYTHROCYTE [DISTWIDTH] IN BLOOD BY AUTOMATED COUNT: 14.5 % (ref 11.5–14.5)
GLOBULIN SER CALC-MCNC: 3.7 G/DL (ref 2–4)
GLUCOSE SERPL-MCNC: 101 MG/DL (ref 65–100)
HCT VFR BLD AUTO: 43 % (ref 35–47)
HGB BLD-MCNC: 13.8 G/DL (ref 11.5–16)
HIV 1+2 AB+HIV1 P24 AG SERPL QL IA: NONREACTIVE
HIV 1/2 RESULT COMMENT: NORMAL
MCH RBC QN AUTO: 28.2 PG (ref 26–34)
MCHC RBC AUTO-ENTMCNC: 32.1 G/DL (ref 30–36.5)
MCV RBC AUTO: 87.9 FL (ref 80–99)
NRBC # BLD: 0 K/UL (ref 0–0.01)
NRBC BLD-RTO: 0 PER 100 WBC
PLATELET # BLD AUTO: 325 K/UL (ref 150–400)
PMV BLD AUTO: 10.9 FL (ref 8.9–12.9)
POTASSIUM SERPL-SCNC: 4.2 MMOL/L (ref 3.5–5.1)
PROT SERPL-MCNC: 8.1 G/DL (ref 6.4–8.2)
RBC # BLD AUTO: 4.89 M/UL (ref 3.8–5.2)
SODIUM SERPL-SCNC: 138 MMOL/L (ref 136–145)
WBC # BLD AUTO: 6.5 K/UL (ref 3.6–11)

## 2023-10-26 PROCEDURE — 3044F HG A1C LEVEL LT 7.0%: CPT | Performed by: INTERNAL MEDICINE

## 2023-10-26 PROCEDURE — 99213 OFFICE O/P EST LOW 20 MIN: CPT | Performed by: INTERNAL MEDICINE

## 2023-10-26 RX ORDER — GABAPENTIN 300 MG/1
CAPSULE ORAL
COMMUNITY

## 2023-10-26 RX ORDER — BUSPIRONE HYDROCHLORIDE 15 MG/1
15 TABLET ORAL 2 TIMES DAILY
COMMUNITY
Start: 2023-10-02

## 2023-10-26 RX ORDER — PROPRANOLOL HYDROCHLORIDE 20 MG/1
20 TABLET ORAL 2 TIMES DAILY
COMMUNITY
Start: 2023-08-30

## 2023-10-26 SDOH — ECONOMIC STABILITY: FOOD INSECURITY: WITHIN THE PAST 12 MONTHS, YOU WORRIED THAT YOUR FOOD WOULD RUN OUT BEFORE YOU GOT MONEY TO BUY MORE.: NEVER TRUE

## 2023-10-26 SDOH — ECONOMIC STABILITY: FOOD INSECURITY: WITHIN THE PAST 12 MONTHS, THE FOOD YOU BOUGHT JUST DIDN'T LAST AND YOU DIDN'T HAVE MONEY TO GET MORE.: NEVER TRUE

## 2023-10-26 SDOH — ECONOMIC STABILITY: INCOME INSECURITY: HOW HARD IS IT FOR YOU TO PAY FOR THE VERY BASICS LIKE FOOD, HOUSING, MEDICAL CARE, AND HEATING?: NOT HARD AT ALL

## 2023-10-26 SDOH — ECONOMIC STABILITY: HOUSING INSECURITY
IN THE LAST 12 MONTHS, WAS THERE A TIME WHEN YOU DID NOT HAVE A STEADY PLACE TO SLEEP OR SLEPT IN A SHELTER (INCLUDING NOW)?: NO

## 2023-10-26 ASSESSMENT — COLUMBIA-SUICIDE SEVERITY RATING SCALE - C-SSRS
3. HAVE YOU BEEN THINKING ABOUT HOW YOU MIGHT KILL YOURSELF?: NO
5. HAVE YOU STARTED TO WORK OUT OR WORKED OUT THE DETAILS OF HOW TO KILL YOURSELF? DO YOU INTEND TO CARRY OUT THIS PLAN?: NO
7. DID THIS OCCUR IN THE LAST THREE MONTHS: NO
4. HAVE YOU HAD THESE THOUGHTS AND HAD SOME INTENTION OF ACTING ON THEM?: NO

## 2023-10-26 ASSESSMENT — PATIENT HEALTH QUESTIONNAIRE - PHQ9
10. IF YOU CHECKED OFF ANY PROBLEMS, HOW DIFFICULT HAVE THESE PROBLEMS MADE IT FOR YOU TO DO YOUR WORK, TAKE CARE OF THINGS AT HOME, OR GET ALONG WITH OTHER PEOPLE: 0
SUM OF ALL RESPONSES TO PHQ QUESTIONS 1-9: 1
SUM OF ALL RESPONSES TO PHQ9 QUESTIONS 1 & 2: 0
5. POOR APPETITE OR OVEREATING: 0
7. TROUBLE CONCENTRATING ON THINGS, SUCH AS READING THE NEWSPAPER OR WATCHING TELEVISION: 0
9. THOUGHTS THAT YOU WOULD BE BETTER OFF DEAD, OR OF HURTING YOURSELF: 0
6. FEELING BAD ABOUT YOURSELF - OR THAT YOU ARE A FAILURE OR HAVE LET YOURSELF OR YOUR FAMILY DOWN: 0
1. LITTLE INTEREST OR PLEASURE IN DOING THINGS: 0
SUM OF ALL RESPONSES TO PHQ QUESTIONS 1-9: 1
2. FEELING DOWN, DEPRESSED OR HOPELESS: 0
SUM OF ALL RESPONSES TO PHQ QUESTIONS 1-9: 1
4. FEELING TIRED OR HAVING LITTLE ENERGY: 1
3. TROUBLE FALLING OR STAYING ASLEEP: 0
SUM OF ALL RESPONSES TO PHQ QUESTIONS 1-9: 1
8. MOVING OR SPEAKING SO SLOWLY THAT OTHER PEOPLE COULD HAVE NOTICED. OR THE OPPOSITE, BEING SO FIGETY OR RESTLESS THAT YOU HAVE BEEN MOVING AROUND A LOT MORE THAN USUAL: 0

## 2023-10-26 NOTE — PROGRESS NOTES
1. \"Have you been to the ER, urgent care clinic since your last visit? Hospitalized since your last visit? \" No    2. \"Have you seen or consulted any other health care providers outside of the [address] [place] [address] since your last visit? \" No     3. For patients aged 43-73: Has the patient had a colonoscopy / FIT/ Cologuard? No      If the patient is female:    4. For patients aged 43-66: Has the patient had a mammogram within the past 2 years? 2023 [facility]      5. For patients aged 21-65: Has the patient had a pap smear?   No

## 2023-10-27 LAB
EST. AVERAGE GLUCOSE BLD GHB EST-MCNC: 114 MG/DL
HBA1C MFR BLD: 5.6 % (ref 4–5.6)

## 2023-11-15 ENCOUNTER — APPOINTMENT (OUTPATIENT)
Facility: HOSPITAL | Age: 58
End: 2023-11-15
Payer: COMMERCIAL

## 2023-11-15 ENCOUNTER — HOSPITAL ENCOUNTER (EMERGENCY)
Facility: HOSPITAL | Age: 58
Discharge: HOME | End: 2023-11-15
Payer: COMMERCIAL

## 2023-11-15 VITALS
DIASTOLIC BLOOD PRESSURE: 90 MMHG | TEMPERATURE: 97.7 F | WEIGHT: 194.45 LBS | SYSTOLIC BLOOD PRESSURE: 143 MMHG | HEART RATE: 75 BPM | BODY MASS INDEX: 33.2 KG/M2 | OXYGEN SATURATION: 100 % | RESPIRATION RATE: 18 BRPM | HEIGHT: 64 IN

## 2023-11-15 DIAGNOSIS — R11.2 NAUSEA AND VOMITING, UNSPECIFIED VOMITING TYPE: ICD-10-CM

## 2023-11-15 DIAGNOSIS — R10.9 ABDOMINAL PAIN, UNSPECIFIED ABDOMINAL LOCATION: Primary | ICD-10-CM

## 2023-11-15 DIAGNOSIS — R19.7 DIARRHEA, UNSPECIFIED TYPE: ICD-10-CM

## 2023-11-15 LAB
ALBUMIN SERPL-MCNC: 3.8 G/DL (ref 3.5–5)
ALBUMIN/GLOB SERPL: 0.9 (ref 1.1–2.2)
ALP SERPL-CCNC: 118 U/L (ref 45–117)
ALT SERPL-CCNC: 40 U/L (ref 12–78)
ANION GAP SERPL CALC-SCNC: 8 MMOL/L (ref 5–15)
APPEARANCE UR: CLEAR
AST SERPL-CCNC: 51 U/L (ref 15–37)
BACTERIA URNS QL MICRO: NEGATIVE /HPF
BASOPHILS # BLD: 0 K/UL (ref 0–0.1)
BASOPHILS NFR BLD: 0 % (ref 0–1)
BILIRUB SERPL-MCNC: 1 MG/DL (ref 0.2–1)
BILIRUB UR QL: NEGATIVE
BUN SERPL-MCNC: 18 MG/DL (ref 6–20)
BUN/CREAT SERPL: 26 (ref 12–20)
CALCIUM SERPL-MCNC: 9 MG/DL (ref 8.5–10.1)
CHLORIDE SERPL-SCNC: 100 MMOL/L (ref 97–108)
CO2 SERPL-SCNC: 26 MMOL/L (ref 21–32)
COLOR UR: ABNORMAL
CREAT SERPL-MCNC: 0.68 MG/DL (ref 0.55–1.02)
DIFFERENTIAL METHOD BLD: ABNORMAL
EOSINOPHIL # BLD: 0.1 K/UL (ref 0–0.4)
EOSINOPHIL NFR BLD: 1 % (ref 0–7)
EPITH CASTS URNS QL MICRO: ABNORMAL /LPF
ERYTHROCYTE [DISTWIDTH] IN BLOOD BY AUTOMATED COUNT: 15.1 % (ref 11.5–14.5)
GLOBULIN SER CALC-MCNC: 4.3 G/DL (ref 2–4)
GLUCOSE SERPL-MCNC: 91 MG/DL (ref 65–100)
GLUCOSE UR STRIP.AUTO-MCNC: NEGATIVE MG/DL
HCT VFR BLD AUTO: 43.1 % (ref 35–47)
HGB BLD-MCNC: 13.9 G/DL (ref 11.5–16)
HGB UR QL STRIP: NEGATIVE
IMM GRANULOCYTES # BLD AUTO: 0 K/UL (ref 0–0.04)
IMM GRANULOCYTES NFR BLD AUTO: 0 % (ref 0–0.5)
KETONES UR QL STRIP.AUTO: NEGATIVE MG/DL
LACTATE SERPL-SCNC: 1.3 MMOL/L (ref 0.4–2)
LEUKOCYTE ESTERASE UR QL STRIP.AUTO: ABNORMAL
LYMPHOCYTES # BLD: 2.1 K/UL (ref 0.8–3.5)
LYMPHOCYTES NFR BLD: 32 % (ref 12–49)
MCH RBC QN AUTO: 28.6 PG (ref 26–34)
MCHC RBC AUTO-ENTMCNC: 32.3 G/DL (ref 30–36.5)
MCV RBC AUTO: 88.7 FL (ref 80–99)
MONOCYTES # BLD: 0.7 K/UL (ref 0–1)
MONOCYTES NFR BLD: 11 % (ref 5–13)
NEUTS SEG # BLD: 3.6 K/UL (ref 1.8–8)
NEUTS SEG NFR BLD: 56 % (ref 32–75)
NITRITE UR QL STRIP.AUTO: NEGATIVE
NRBC # BLD: 0 K/UL (ref 0–0.01)
NRBC BLD-RTO: 0 PER 100 WBC
PH UR STRIP: 6 (ref 5–8)
PLATELET # BLD AUTO: 316 K/UL (ref 150–400)
PMV BLD AUTO: 10.7 FL (ref 8.9–12.9)
POTASSIUM SERPL-SCNC: 4.5 MMOL/L (ref 3.5–5.1)
PROT SERPL-MCNC: 8.1 G/DL (ref 6.4–8.2)
PROT UR STRIP-MCNC: NEGATIVE MG/DL
RBC # BLD AUTO: 4.86 M/UL (ref 3.8–5.2)
RBC #/AREA URNS HPF: ABNORMAL /HPF (ref 0–5)
SODIUM SERPL-SCNC: 134 MMOL/L (ref 136–145)
SP GR UR REFRACTOMETRY: 1.01
URINE CULTURE IF INDICATED: ABNORMAL
UROBILINOGEN UR QL STRIP.AUTO: 0.2 EU/DL (ref 0.2–1)
WBC # BLD AUTO: 6.5 K/UL (ref 3.6–11)
WBC URNS QL MICRO: ABNORMAL /HPF (ref 0–4)

## 2023-11-15 PROCEDURE — 99285 EMERGENCY DEPT VISIT HI MDM: CPT

## 2023-11-15 PROCEDURE — 81001 URINALYSIS AUTO W/SCOPE: CPT

## 2023-11-15 PROCEDURE — 85025 COMPLETE CBC W/AUTO DIFF WBC: CPT

## 2023-11-15 PROCEDURE — 74177 CT ABD & PELVIS W/CONTRAST: CPT

## 2023-11-15 PROCEDURE — 6360000004 HC RX CONTRAST MEDICATION: Performed by: PHYSICIAN ASSISTANT

## 2023-11-15 PROCEDURE — 96374 THER/PROPH/DIAG INJ IV PUSH: CPT

## 2023-11-15 PROCEDURE — 6360000002 HC RX W HCPCS: Performed by: PHYSICIAN ASSISTANT

## 2023-11-15 PROCEDURE — 83605 ASSAY OF LACTIC ACID: CPT

## 2023-11-15 PROCEDURE — 96375 TX/PRO/DX INJ NEW DRUG ADDON: CPT

## 2023-11-15 PROCEDURE — 36415 COLL VENOUS BLD VENIPUNCTURE: CPT

## 2023-11-15 PROCEDURE — 80053 COMPREHEN METABOLIC PANEL: CPT

## 2023-11-15 RX ORDER — KETOROLAC TROMETHAMINE 30 MG/ML
15 INJECTION, SOLUTION INTRAMUSCULAR; INTRAVENOUS
Status: COMPLETED | OUTPATIENT
Start: 2023-11-15 | End: 2023-11-15

## 2023-11-15 RX ORDER — ONDANSETRON 4 MG/1
4 TABLET, FILM COATED ORAL DAILY PRN
Qty: 20 TABLET | Refills: 0 | Status: SHIPPED | OUTPATIENT
Start: 2023-11-15

## 2023-11-15 RX ORDER — 0.9 % SODIUM CHLORIDE 0.9 %
500 INTRAVENOUS SOLUTION INTRAVENOUS ONCE
Status: DISCONTINUED | OUTPATIENT
Start: 2023-11-15 | End: 2023-11-15 | Stop reason: HOSPADM

## 2023-11-15 RX ORDER — ONDANSETRON 2 MG/ML
4 INJECTION INTRAMUSCULAR; INTRAVENOUS ONCE
Status: COMPLETED | OUTPATIENT
Start: 2023-11-15 | End: 2023-11-15

## 2023-11-15 RX ADMIN — DIATRIZOATE MEGLUMINE AND DIATRIZOATE SODIUM 30 ML: 660; 100 LIQUID ORAL; RECTAL at 13:05

## 2023-11-15 RX ADMIN — ONDANSETRON 4 MG: 2 INJECTION INTRAMUSCULAR; INTRAVENOUS at 13:08

## 2023-11-15 RX ADMIN — IOPAMIDOL 100 ML: 755 INJECTION, SOLUTION INTRAVENOUS at 14:33

## 2023-11-15 RX ADMIN — KETOROLAC TROMETHAMINE 15 MG: 30 INJECTION, SOLUTION INTRAMUSCULAR; INTRAVENOUS at 13:08

## 2023-11-15 ASSESSMENT — PAIN SCALES - GENERAL
PAINLEVEL_OUTOF10: 9
PAINLEVEL_OUTOF10: 8

## 2023-11-15 ASSESSMENT — PAIN - FUNCTIONAL ASSESSMENT: PAIN_FUNCTIONAL_ASSESSMENT: 0-10

## 2023-11-15 NOTE — ED PROVIDER NOTES
to the emergency department with diffuse abdominal pain. Sent in by her gastroenterologist to have CT scan to rule out bowel obstruction. No evidence of this on her CT scan. She was able to drink oral contrast and has not vomited and has been able to keep this down. We will send her home with oral Zofran. Following up with her gastroenterologist.  Worsening symptoms she is to return           FINAL IMPRESSION     1. Abdominal pain, unspecified abdominal location    2. Nausea and vomiting, unspecified vomiting type    3. Diarrhea, unspecified type          DISPOSITION/PLAN   DISPOSITION Decision To Discharge 11/15/2023 03:05:44 PM      Discharge Note: The patient is stable for discharge home. The signs, symptoms, diagnosis, and discharge instructions have been discussed, understanding conveyed, and agreed upon. The patient is to follow up as recommended or return to ER should their symptoms worsen.       PATIENT REFERRED TO:  \[facility]\"" EMERGENCY DEPT  [address] [place] [address] [address]  [phone]    If symptoms worsen       DISCHARGE MEDICATIONS:     Medication List        START taking these medications      ondansetron 4 MG tablet  Commonly known as: ZOFRAN  Take 1 tablet by mouth daily as needed for Nausea or Vomiting            ASK your doctor about these medications      ALPRAZolam 0.25 MG tablet  Commonly known as: XANAX     atorvastatin 10 MG tablet  Commonly known as: LIPITOR     busPIRone 15 MG tablet  Commonly known as: BUSPAR     cyanocobalamin 1000 MCG tablet     cyclobenzaprine 10 MG tablet  Commonly known as: FLEXERIL     diclofenac 75 MG EC tablet  Commonly known as: VOLTAREN     EPINEPHrine 0.3 MG/0.3ML Soaj injection  Commonly known as: EPIPEN     escitalopram 20 MG tablet  Commonly known as: LEXAPRO     gabapentin 300 MG capsule  Commonly known as: NEURONTIN     metFORMIN 500 MG tablet  Commonly known as: GLUCOPHAGE     MULTIVITAMIN ADULT PO     * omeprazole 40 MG delayed release

## 2023-11-29 DIAGNOSIS — J01.90 ACUTE SINUSITIS, RECURRENCE NOT SPECIFIED, UNSPECIFIED LOCATION: Primary | ICD-10-CM

## 2023-11-29 RX ORDER — AZITHROMYCIN 250 MG/1
250 TABLET, FILM COATED ORAL SEE ADMIN INSTRUCTIONS
Qty: 6 TABLET | Refills: 0 | Status: SHIPPED | OUTPATIENT
Start: 2023-11-29 | End: 2023-12-04

## 2023-12-05 RX ORDER — ATORVASTATIN CALCIUM 10 MG/1
TABLET, FILM COATED ORAL
Qty: 90 TABLET | Refills: 3 | Status: SHIPPED | OUTPATIENT
Start: 2023-12-05

## 2023-12-05 RX ORDER — BUSPIRONE HYDROCHLORIDE 5 MG/1
5 TABLET ORAL 2 TIMES DAILY
Qty: 180 TABLET | Refills: 3 | Status: SHIPPED | OUTPATIENT
Start: 2023-12-05

## 2023-12-15 RX ORDER — OMEPRAZOLE 20 MG/1
CAPSULE, DELAYED RELEASE ORAL
Qty: 90 CAPSULE | Refills: 3 | Status: SHIPPED | OUTPATIENT
Start: 2023-12-15

## 2023-12-25 LAB — HBA1C MFR BLD HPLC: 5.3 %

## 2024-01-29 RX ORDER — TRIAMTERENE AND HYDROCHLOROTHIAZIDE 37.5; 25 MG/1; MG/1
TABLET ORAL
Qty: 90 TABLET | Refills: 3 | Status: SHIPPED | OUTPATIENT
Start: 2024-01-29

## 2024-02-21 ENCOUNTER — TRANSCRIBE ORDERS (OUTPATIENT)
Facility: HOSPITAL | Age: 59
End: 2024-02-21

## 2024-02-21 DIAGNOSIS — Z12.31 SCREENING MAMMOGRAM FOR HIGH-RISK PATIENT: Primary | ICD-10-CM

## 2024-02-29 ENCOUNTER — HOSPITAL ENCOUNTER (OUTPATIENT)
Facility: HOSPITAL | Age: 59
Discharge: HOME | End: 2024-02-29
Attending: INTERNAL MEDICINE
Payer: COMMERCIAL

## 2024-02-29 VITALS — BODY MASS INDEX: 33.12 KG/M2 | WEIGHT: 194 LBS | HEIGHT: 64 IN

## 2024-02-29 DIAGNOSIS — Z12.31 SCREENING MAMMOGRAM FOR HIGH-RISK PATIENT: ICD-10-CM

## 2024-02-29 PROCEDURE — 77067 SCR MAMMO BI INCL CAD: CPT

## 2024-03-12 ENCOUNTER — OFFICE VISIT (OUTPATIENT)
Age: 59
End: 2024-03-12
Payer: COMMERCIAL

## 2024-03-12 VITALS
HEIGHT: 64 IN | WEIGHT: 194 LBS | TEMPERATURE: 97 F | HEART RATE: 92 BPM | BODY MASS INDEX: 33.12 KG/M2 | OXYGEN SATURATION: 96 % | DIASTOLIC BLOOD PRESSURE: 63 MMHG | SYSTOLIC BLOOD PRESSURE: 118 MMHG

## 2024-03-12 DIAGNOSIS — K75.81 NONALCOHOLIC STEATOHEPATITIS (NASH): Primary | ICD-10-CM

## 2024-03-12 PROCEDURE — 99214 OFFICE O/P EST MOD 30 MIN: CPT | Performed by: PHYSICIAN ASSISTANT

## 2024-03-12 PROCEDURE — 91200 LIVER ELASTOGRAPHY: CPT | Performed by: PHYSICIAN ASSISTANT

## 2024-03-12 NOTE — PROGRESS NOTES
Identified pt with two pt identifiers(name and ). Reviewed record in preparation for visit and have obtained necessary documentation.    Chief Complaint   Patient presents with    Follow-up     FIBROSCAN     /63 (Site: Left Upper Arm, Position: Sitting, Cuff Size: Large Adult)   Pulse 92   Temp 97 °F (36.1 °C) (Temporal)   Ht 1.626 m (5' 4\")   Wt 88 kg (194 lb)   SpO2 96%   BMI 33.30 kg/m²       1. \"Have you been to the ER, urgent care clinic since your last visit?  Hospitalized since your last visit?\"  YES per pt on Vilma Willise stomach and back pain at [facility]      Patient is accompanied by SELF  I have received verbal consent from Aleshia Lackey to discuss any/all medical information while they are present in the room.

## 2024-03-12 NOTE — PROGRESS NOTES
[facility]      Bobby Gunter MD, FACP, FACG, FAASLD      ELISABETH Stone, [facility]-BC   Jen Ratian, [facility]   Renea Duke, FNP-C  Dionisio Jose, FNP-C   Opal Turcios, [facility]   [address], [address]   [place]  [address] [phone]   FAX: [phone]  [facility]   [address], [address]   [place]  [address] [phone]   FAX: [phone]     Patient Care Team:  Rosendo Au MD as PCP - General  Rosendo Au MD as PCP - Empaneled Provider  Kevin Mirza MD as Consulting Physician  Matias Mcacrthy MD as Physician  Kevin Huber MD as Physician    Patient Active Problem List   Diagnosis    Pure hypercholesterolemia    Anxiety    HLD (hyperlipidemia)    Dislocation of prosthetic joint (HCC)    Prediabetes    DJD (degenerative joint disease) of knee    Fibromyalgia    Insomnia    Severe obesity (BMI 35.0-39.9) with comorbidity (HCC)    Ischemic colitis (HCC)    History of knee replacement    BUCKLEY (nonalcoholic steatohepatitis)     Aleshia Lackey is being seen at [facility] for management of metabolic-associated steatohepatitis (MASH).  The active problem list, all pertinent past medical history, medications, liver histology, radiologic findings and laboratory findings related to the liver disorder were reviewed and discussed with the patient.      The patient is a 58 y.o.  female who was found to have elevated liver transaminases and alkaline phosphatase in 2018.      The patient underwent a liver biopsy in 8/2022.  This demonstrates BUCKLEY with stage 3 fibrosis.  Past Fibroscan has shown EkPa was 10.6.  Suggested fibrosis level is F3.  CAP score is 285, this is consistent with steatosis. We plan on repeating

## 2024-03-13 LAB
ALBUMIN SERPL-MCNC: 3.8 G/DL (ref 3.5–5)
ALBUMIN/GLOB SERPL: 1.2 (ref 1.1–2.2)
ALP SERPL-CCNC: 154 U/L (ref 45–117)
ALT SERPL-CCNC: 38 U/L (ref 12–78)
ANION GAP SERPL CALC-SCNC: 7 MMOL/L (ref 5–15)
AST SERPL-CCNC: 20 U/L (ref 15–37)
BILIRUB DIRECT SERPL-MCNC: <0.1 MG/DL (ref 0–0.2)
BILIRUB SERPL-MCNC: 0.3 MG/DL (ref 0.2–1)
BUN SERPL-MCNC: 31 MG/DL (ref 6–20)
BUN/CREAT SERPL: 37 (ref 12–20)
CALCIUM SERPL-MCNC: 9.6 MG/DL (ref 8.5–10.1)
CHLORIDE SERPL-SCNC: 105 MMOL/L (ref 97–108)
CO2 SERPL-SCNC: 32 MMOL/L (ref 21–32)
CREAT SERPL-MCNC: 0.84 MG/DL (ref 0.55–1.02)
ERYTHROCYTE [DISTWIDTH] IN BLOOD BY AUTOMATED COUNT: 14.8 % (ref 11.5–14.5)
GLOBULIN SER CALC-MCNC: 3.2 G/DL (ref 2–4)
GLUCOSE SERPL-MCNC: 106 MG/DL (ref 65–100)
HCT VFR BLD AUTO: 41 % (ref 35–47)
HGB BLD-MCNC: 12.5 G/DL (ref 11.5–16)
MCH RBC QN AUTO: 28 PG (ref 26–34)
MCHC RBC AUTO-ENTMCNC: 30.5 G/DL (ref 30–36.5)
MCV RBC AUTO: 91.7 FL (ref 80–99)
NRBC # BLD: 0 K/UL (ref 0–0.01)
NRBC BLD-RTO: 0 PER 100 WBC
PLATELET # BLD AUTO: 313 K/UL (ref 150–400)
PMV BLD AUTO: 11.4 FL (ref 8.9–12.9)
POTASSIUM SERPL-SCNC: 4 MMOL/L (ref 3.5–5.1)
PROT SERPL-MCNC: 7 G/DL (ref 6.4–8.2)
RBC # BLD AUTO: 4.47 M/UL (ref 3.8–5.2)
SODIUM SERPL-SCNC: 144 MMOL/L (ref 136–145)
WBC # BLD AUTO: 6.2 K/UL (ref 3.6–11)

## 2024-03-14 LAB — MITOCHONDRIA M2 IGG SER-ACNC: <20 UNITS (ref 0–20)

## 2024-03-19 ENCOUNTER — CLINICAL DOCUMENTATION (OUTPATIENT)
Age: 59
End: 2024-03-19

## 2024-03-19 DIAGNOSIS — E11.9 TYPE 2 DIABETES MELLITUS WITHOUT COMPLICATIONS (HCC): ICD-10-CM

## 2024-03-19 DIAGNOSIS — R74.8 ABNORMAL LEVELS OF OTHER SERUM ENZYMES: Primary | ICD-10-CM

## 2024-04-15 RX ORDER — SEMAGLUTIDE 0.68 MG/ML
INJECTION, SOLUTION SUBCUTANEOUS
Qty: 9 ML | Refills: 5 | Status: SHIPPED | OUTPATIENT
Start: 2024-04-15

## 2024-04-19 LAB — GGT SERPL-CCNC: 30 IU/L (ref 0–60)

## 2024-04-23 LAB
ALP BONE CFR SERPL: 35 % (ref 14–68)
ALP INTEST CFR SERPL: 4 % (ref 0–18)
ALP LIVER CFR SERPL: 61 % (ref 18–85)
ALP SERPL-CCNC: 139 IU/L (ref 44–121)

## 2024-05-05 ENCOUNTER — PATIENT MESSAGE (OUTPATIENT)
Age: 59
End: 2024-05-05

## 2024-05-06 RX ORDER — SEMAGLUTIDE 0.68 MG/ML
INJECTION, SOLUTION SUBCUTANEOUS
Qty: 9 ML | Refills: 5 | Status: SHIPPED | OUTPATIENT
Start: 2024-05-06

## 2024-05-06 NOTE — TELEPHONE ENCOUNTER
KODAK SOLITARIO (Key: [id number] - [id number]  Ozempic (0.25 or 0.5 MG/DOSE) 2MG/3ML pen-injectors  Status: PA RequestCreated: May 6th, 2024Sent: May 6th, 2024

## 2024-05-07 NOTE — TELEPHONE ENCOUNTER
Your PA request has been closed. No PA required at this time. Please have the pharmacy process the request. Dispense max day supply 30. - CC, 05/06/2024 02:06 PM

## 2024-08-15 RX ORDER — CEPHALEXIN 500 MG/1
2000 CAPSULE ORAL ONCE
Qty: 12 CAPSULE | Refills: 0 | Status: SHIPPED | OUTPATIENT
Start: 2024-08-15 | End: 2024-08-15

## 2024-09-09 RX ORDER — ATORVASTATIN CALCIUM 10 MG/1
TABLET, FILM COATED ORAL
Qty: 90 TABLET | Refills: 3 | Status: SHIPPED | OUTPATIENT
Start: 2024-09-09

## 2024-09-11 RX ORDER — BUSPIRONE HYDROCHLORIDE 5 MG/1
5 TABLET ORAL 2 TIMES DAILY
Qty: 180 TABLET | Refills: 3 | Status: SHIPPED | OUTPATIENT
Start: 2024-09-11

## 2024-09-12 ENCOUNTER — OFFICE VISIT (OUTPATIENT)
Age: 59
End: 2024-09-12
Payer: COMMERCIAL

## 2024-09-12 VITALS
RESPIRATION RATE: 18 BRPM | BODY MASS INDEX: 33.97 KG/M2 | WEIGHT: 199 LBS | OXYGEN SATURATION: 100 % | DIASTOLIC BLOOD PRESSURE: 74 MMHG | SYSTOLIC BLOOD PRESSURE: 124 MMHG | HEIGHT: 64 IN | HEART RATE: 91 BPM

## 2024-09-12 DIAGNOSIS — K75.81 NONALCOHOLIC STEATOHEPATITIS (NASH): Primary | ICD-10-CM

## 2024-09-12 DIAGNOSIS — E11.9 TYPE 2 DIABETES MELLITUS WITHOUT COMPLICATION, WITHOUT LONG-TERM CURRENT USE OF INSULIN (HCC): Primary | ICD-10-CM

## 2024-09-12 LAB
ALBUMIN SERPL-MCNC: 4.3 G/DL (ref 3.5–5)
ALBUMIN/GLOB SERPL: 1.3 (ref 1.1–2.2)
ALP SERPL-CCNC: 146 U/L (ref 45–117)
ALT SERPL-CCNC: 55 U/L (ref 12–78)
ANION GAP SERPL CALC-SCNC: 4 MMOL/L (ref 2–12)
AST SERPL-CCNC: 37 U/L (ref 15–37)
BILIRUB DIRECT SERPL-MCNC: <0.1 MG/DL (ref 0–0.2)
BILIRUB SERPL-MCNC: 0.5 MG/DL (ref 0.2–1)
BUN SERPL-MCNC: 12 MG/DL (ref 6–20)
BUN/CREAT SERPL: 15 (ref 12–20)
CALCIUM SERPL-MCNC: 9.7 MG/DL (ref 8.5–10.1)
CHLORIDE SERPL-SCNC: 102 MMOL/L (ref 97–108)
CO2 SERPL-SCNC: 32 MMOL/L (ref 21–32)
CREAT SERPL-MCNC: 0.81 MG/DL (ref 0.55–1.02)
ERYTHROCYTE [DISTWIDTH] IN BLOOD BY AUTOMATED COUNT: 14.5 % (ref 11.5–14.5)
GLOBULIN SER CALC-MCNC: 3.4 G/DL (ref 2–4)
GLUCOSE SERPL-MCNC: 103 MG/DL (ref 65–100)
HCT VFR BLD AUTO: 40.1 % (ref 35–47)
HGB BLD-MCNC: 12.9 G/DL (ref 11.5–16)
MCH RBC QN AUTO: 28.3 PG (ref 26–34)
MCHC RBC AUTO-ENTMCNC: 32.2 G/DL (ref 30–36.5)
MCV RBC AUTO: 87.9 FL (ref 80–99)
NRBC # BLD: 0 K/UL (ref 0–0.01)
NRBC BLD-RTO: 0 PER 100 WBC
PLATELET # BLD AUTO: 308 K/UL (ref 150–400)
PMV BLD AUTO: 11.4 FL (ref 8.9–12.9)
POTASSIUM SERPL-SCNC: 3.7 MMOL/L (ref 3.5–5.1)
PROT SERPL-MCNC: 7.7 G/DL (ref 6.4–8.2)
RBC # BLD AUTO: 4.56 M/UL (ref 3.8–5.2)
SODIUM SERPL-SCNC: 138 MMOL/L (ref 136–145)
WBC # BLD AUTO: 6.2 K/UL (ref 3.6–11)

## 2024-09-12 PROCEDURE — 99214 OFFICE O/P EST MOD 30 MIN: CPT | Performed by: PHYSICIAN ASSISTANT

## 2024-09-12 RX ORDER — SEMAGLUTIDE 1.34 MG/ML
1 INJECTION, SOLUTION SUBCUTANEOUS
Qty: 3 ADJUSTABLE DOSE PRE-FILLED PEN SYRINGE | Refills: 1 | Status: SHIPPED | OUTPATIENT
Start: 2024-09-12

## 2024-09-12 RX ORDER — HYDROXYZINE PAMOATE 25 MG/1
CAPSULE ORAL
COMMUNITY
Start: 2024-08-23

## 2024-09-12 ASSESSMENT — PATIENT HEALTH QUESTIONNAIRE - PHQ9
DEPRESSION UNABLE TO ASSESS: FUNCTIONAL CAPACITY MOTIVATION LIMITS ACCURACY
SUM OF ALL RESPONSES TO PHQ QUESTIONS 1-9: 0
2. FEELING DOWN, DEPRESSED OR HOPELESS: NOT AT ALL
SUM OF ALL RESPONSES TO PHQ QUESTIONS 1-9: 0
SUM OF ALL RESPONSES TO PHQ9 QUESTIONS 1 & 2: 0
SUM OF ALL RESPONSES TO PHQ QUESTIONS 1-9: 0
SUM OF ALL RESPONSES TO PHQ QUESTIONS 1-9: 0
1. LITTLE INTEREST OR PLEASURE IN DOING THINGS: NOT AT ALL

## 2024-09-13 ENCOUNTER — TELEPHONE (OUTPATIENT)
Age: 59
End: 2024-09-13

## 2024-10-04 ENCOUNTER — TELEPHONE (OUTPATIENT)
Age: 59
End: 2024-10-04

## 2024-11-12 ENCOUNTER — OFFICE VISIT (OUTPATIENT)
Age: 59
End: 2024-11-12
Payer: COMMERCIAL

## 2024-11-12 VITALS
WEIGHT: 193.6 LBS | TEMPERATURE: 97.1 F | RESPIRATION RATE: 18 BRPM | BODY MASS INDEX: 33.05 KG/M2 | OXYGEN SATURATION: 95 % | SYSTOLIC BLOOD PRESSURE: 100 MMHG | DIASTOLIC BLOOD PRESSURE: 80 MMHG | HEART RATE: 100 BPM | HEIGHT: 64 IN

## 2024-11-12 DIAGNOSIS — E66.811 OBESITY (BMI 30.0-34.9): ICD-10-CM

## 2024-11-12 DIAGNOSIS — K75.81 METABOLIC DYSFUNCTION-ASSOCIATED STEATOHEPATITIS (MASH): ICD-10-CM

## 2024-11-12 DIAGNOSIS — E78.5 HYPERLIPIDEMIA, UNSPECIFIED HYPERLIPIDEMIA TYPE: ICD-10-CM

## 2024-11-12 DIAGNOSIS — G47.00 INSOMNIA, UNSPECIFIED TYPE: ICD-10-CM

## 2024-11-12 DIAGNOSIS — F41.9 ANXIETY: ICD-10-CM

## 2024-11-12 DIAGNOSIS — Z00.00 ROUTINE PHYSICAL EXAMINATION: Primary | ICD-10-CM

## 2024-11-12 DIAGNOSIS — E11.9 TYPE 2 DIABETES MELLITUS WITHOUT COMPLICATION, WITHOUT LONG-TERM CURRENT USE OF INSULIN (HCC): ICD-10-CM

## 2024-11-12 LAB
CHOLEST SERPL-MCNC: 163 MG/DL
CREAT UR-MCNC: 53 MG/DL
EST. AVERAGE GLUCOSE BLD GHB EST-MCNC: 123 MG/DL
HBA1C MFR BLD: 5.9 % (ref 4–5.6)
HDLC SERPL-MCNC: 52 MG/DL
HDLC SERPL: 3.1 (ref 0–5)
LDLC SERPL CALC-MCNC: 67.8 MG/DL (ref 0–100)
MICROALBUMIN UR-MCNC: <0.5 MG/DL
MICROALBUMIN/CREAT UR-RTO: <9 MG/G (ref 0–30)
SPECIMEN HOLD: NORMAL
TRIGL SERPL-MCNC: 216 MG/DL
TSH SERPL DL<=0.05 MIU/L-ACNC: 1.21 UIU/ML (ref 0.36–3.74)
VLDLC SERPL CALC-MCNC: 43.2 MG/DL

## 2024-11-12 PROCEDURE — 99396 PREV VISIT EST AGE 40-64: CPT | Performed by: INTERNAL MEDICINE

## 2024-11-12 RX ORDER — BUSPIRONE HYDROCHLORIDE 15 MG/1
15 TABLET ORAL 2 TIMES DAILY
Qty: 30 TABLET | Refills: 0
Start: 2024-11-12

## 2024-11-12 RX ORDER — ZOLPIDEM TARTRATE 10 MG/1
10 TABLET ORAL NIGHTLY
Qty: 90 TABLET | Refills: 1 | Status: SHIPPED | OUTPATIENT
Start: 2024-11-12 | End: 2025-05-11

## 2024-11-12 RX ORDER — EPINEPHRINE 0.3 MG/.3ML
0.3 INJECTION SUBCUTANEOUS ONCE
Qty: 0.3 ML | Refills: 0 | Status: SHIPPED | OUTPATIENT
Start: 2024-11-12 | End: 2024-11-12

## 2024-11-12 RX ORDER — ALBUTEROL SULFATE 90 UG/1
2 INHALANT RESPIRATORY (INHALATION) 4 TIMES DAILY PRN
Qty: 17 G | Refills: 1 | Status: SHIPPED | OUTPATIENT
Start: 2024-11-12

## 2024-11-12 SDOH — ECONOMIC STABILITY: FOOD INSECURITY: WITHIN THE PAST 12 MONTHS, YOU WORRIED THAT YOUR FOOD WOULD RUN OUT BEFORE YOU GOT MONEY TO BUY MORE.: NEVER TRUE

## 2024-11-12 SDOH — ECONOMIC STABILITY: FOOD INSECURITY: WITHIN THE PAST 12 MONTHS, THE FOOD YOU BOUGHT JUST DIDN'T LAST AND YOU DIDN'T HAVE MONEY TO GET MORE.: NEVER TRUE

## 2024-11-12 SDOH — ECONOMIC STABILITY: INCOME INSECURITY: HOW HARD IS IT FOR YOU TO PAY FOR THE VERY BASICS LIKE FOOD, HOUSING, MEDICAL CARE, AND HEATING?: NOT HARD AT ALL

## 2024-11-12 ASSESSMENT — PATIENT HEALTH QUESTIONNAIRE - PHQ9
SUM OF ALL RESPONSES TO PHQ QUESTIONS 1-9: 2
1. LITTLE INTEREST OR PLEASURE IN DOING THINGS: SEVERAL DAYS
2. FEELING DOWN, DEPRESSED OR HOPELESS: SEVERAL DAYS
SUM OF ALL RESPONSES TO PHQ QUESTIONS 1-9: 2
SUM OF ALL RESPONSES TO PHQ9 QUESTIONS 1 & 2: 2

## 2024-11-12 NOTE — PROGRESS NOTES
\"Have you been to the ER, urgent care clinic since your last visit?  Hospitalized since your last visit?\"    NO    “Have you seen or consulted any other health care providers outside our system since your last visit?”    Liver specialist   Rheumatology       “Have you had a diabetic eye exam?”    2024    No diabetic eye exam on file            
BY MOUTH TWICE A  tablet 3    atorvastatin (LIPITOR) 10 MG tablet TAKE 1 TABLET BY MOUTH EVERY DAY FOR HIGH CHOLESTEROL AND HIGH TRIGLYCERIDES 90 tablet 3    metFORMIN (GLUCOPHAGE) 500 MG tablet TAKE 2 TABLETS BY MOUTH TWICE A DAY WITH MEALS 360 tablet 3    triamterene-hydroCHLOROthiazide (MAXZIDE-25) 37.5-25 MG per tablet TAKE 1 TABLET BY MOUTH EVERY DAY 90 tablet 3    omeprazole (PRILOSEC) 20 MG delayed release capsule TAKE 1 CAPSULE BY MOUTH EVERY DAY 90 capsule 3    ondansetron (ZOFRAN) 4 MG tablet Take 1 tablet by mouth daily as needed for Nausea or Vomiting 20 tablet 0    gabapentin (NEURONTIN) 300 MG capsule gabapentin      propranolol (INDERAL) 20 MG tablet Take 1 tablet by mouth 2 times daily      Multiple Vitamin (MULTIVITAMIN ADULT PO) Take 1 tablet by mouth daily      busPIRone (BUSPAR) 15 MG tablet Take 15 mg by mouth 2 times daily      ALPRAZolam (XANAX) 0.25 MG tablet Take by mouth daily as needed.      cyanocobalamin 1000 MCG tablet Take by mouth daily      cyclobenzaprine (FLEXERIL) 10 MG tablet Take by mouth 2 times daily      diclofenac (VOLTAREN) 75 MG EC tablet Take by mouth as needed      EPINEPHrine (EPIPEN) 0.3 MG/0.3ML SOAJ injection Inject into the muscle once as needed      escitalopram (LEXAPRO) 20 MG tablet TAKE 1 TABLET BY MOUTH EVERY DAY      omeprazole (PRILOSEC) 40 MG delayed release capsule Take by mouth 2 times daily      zolpidem (AMBIEN) 10 MG tablet TAKE 1 TABLET BY MOUTH AT BEDTIME       No current facility-administered medications for this visit.     Allergies   Allergen Reactions    Latex Dermatitis     Welts, redness, \"oozing\" per pt.    Meperidine Anaphylaxis    Strawberry Anaphylaxis    Hydromorphone Other (See Comments)     Nausea and hypotension    Morphine Other (See Comments)     Hypotension    Penicillins Hives    Pregabalin Swelling     Feet and ankles and hands swelling and headaches     Social History     Tobacco Use    Smoking status: Never    Smokeless

## 2024-12-04 RX ORDER — ESCITALOPRAM OXALATE 20 MG/1
TABLET ORAL
Qty: 90 TABLET | Refills: 3 | Status: SHIPPED | OUTPATIENT
Start: 2024-12-04

## 2024-12-10 RX ORDER — TRIAMTERENE AND HYDROCHLOROTHIAZIDE 37.5; 25 MG/1; MG/1
TABLET ORAL
Qty: 90 TABLET | Refills: 3 | Status: SHIPPED | OUTPATIENT
Start: 2024-12-10

## 2024-12-18 DIAGNOSIS — J01.90 ACUTE SINUSITIS, RECURRENCE NOT SPECIFIED, UNSPECIFIED LOCATION: Primary | ICD-10-CM

## 2024-12-18 RX ORDER — AZITHROMYCIN 250 MG/1
TABLET, FILM COATED ORAL
Qty: 6 TABLET | Refills: 0 | Status: SHIPPED | OUTPATIENT
Start: 2024-12-18 | End: 2024-12-28

## 2025-01-07 RX ORDER — ALBUTEROL SULFATE 90 UG/1
2 INHALANT RESPIRATORY (INHALATION) 4 TIMES DAILY PRN
Qty: 18 EACH | Refills: 1 | Status: SHIPPED | OUTPATIENT
Start: 2025-01-07

## 2025-02-17 DIAGNOSIS — E11.9 TYPE 2 DIABETES MELLITUS WITHOUT COMPLICATIONS (HCC): ICD-10-CM

## 2025-03-04 DIAGNOSIS — E11.9 TYPE 2 DIABETES MELLITUS WITHOUT COMPLICATION, WITHOUT LONG-TERM CURRENT USE OF INSULIN (HCC): ICD-10-CM

## 2025-03-04 RX ORDER — SEMAGLUTIDE 1.34 MG/ML
1 INJECTION, SOLUTION SUBCUTANEOUS
Qty: 3 ML | Refills: 5 | Status: SHIPPED | OUTPATIENT
Start: 2025-03-04

## 2025-03-25 ENCOUNTER — OFFICE VISIT (OUTPATIENT)
Age: 60
End: 2025-03-25
Payer: COMMERCIAL

## 2025-03-25 VITALS
HEART RATE: 91 BPM | WEIGHT: 190.4 LBS | OXYGEN SATURATION: 94 % | BODY MASS INDEX: 32.5 KG/M2 | HEIGHT: 64 IN | DIASTOLIC BLOOD PRESSURE: 81 MMHG | SYSTOLIC BLOOD PRESSURE: 120 MMHG | TEMPERATURE: 98.7 F

## 2025-03-25 DIAGNOSIS — K75.81 NONALCOHOLIC STEATOHEPATITIS (NASH): ICD-10-CM

## 2025-03-25 DIAGNOSIS — R74.8 ELEVATED ALKALINE PHOSPHATASE LEVEL: ICD-10-CM

## 2025-03-25 DIAGNOSIS — K75.81 NONALCOHOLIC STEATOHEPATITIS (NASH): Primary | ICD-10-CM

## 2025-03-25 LAB
ALBUMIN SERPL-MCNC: 4 G/DL (ref 3.5–5)
ALBUMIN/GLOB SERPL: 1.2 (ref 1.1–2.2)
ALP SERPL-CCNC: 118 U/L (ref 45–117)
ALT SERPL-CCNC: 32 U/L (ref 12–78)
ANION GAP SERPL CALC-SCNC: 6 MMOL/L (ref 2–12)
AST SERPL-CCNC: 17 U/L (ref 15–37)
BILIRUB DIRECT SERPL-MCNC: 0.1 MG/DL (ref 0–0.2)
BILIRUB SERPL-MCNC: 0.4 MG/DL (ref 0.2–1)
BUN SERPL-MCNC: 18 MG/DL (ref 6–20)
BUN/CREAT SERPL: 23 (ref 12–20)
CALCIUM SERPL-MCNC: 9.4 MG/DL (ref 8.5–10.1)
CHLORIDE SERPL-SCNC: 103 MMOL/L (ref 97–108)
CO2 SERPL-SCNC: 32 MMOL/L (ref 21–32)
CREAT SERPL-MCNC: 0.79 MG/DL (ref 0.55–1.02)
ERYTHROCYTE [DISTWIDTH] IN BLOOD BY AUTOMATED COUNT: 14.8 % (ref 11.5–14.5)
GLOBULIN SER CALC-MCNC: 3.3 G/DL (ref 2–4)
GLUCOSE SERPL-MCNC: 92 MG/DL (ref 65–100)
HCT VFR BLD AUTO: 39.2 % (ref 35–47)
HGB BLD-MCNC: 12.3 G/DL (ref 11.5–16)
MCH RBC QN AUTO: 28.3 PG (ref 26–34)
MCHC RBC AUTO-ENTMCNC: 31.4 G/DL (ref 30–36.5)
MCV RBC AUTO: 90.1 FL (ref 80–99)
NRBC # BLD: 0 K/UL (ref 0–0.01)
NRBC BLD-RTO: 0 PER 100 WBC
PLATELET # BLD AUTO: 282 K/UL (ref 150–400)
PMV BLD AUTO: 11.1 FL (ref 8.9–12.9)
POTASSIUM SERPL-SCNC: 4 MMOL/L (ref 3.5–5.1)
PROT SERPL-MCNC: 7.3 G/DL (ref 6.4–8.2)
RBC # BLD AUTO: 4.35 M/UL (ref 3.8–5.2)
SODIUM SERPL-SCNC: 141 MMOL/L (ref 136–145)
WBC # BLD AUTO: 5.2 K/UL (ref 3.6–11)

## 2025-03-25 PROCEDURE — 99214 OFFICE O/P EST MOD 30 MIN: CPT | Performed by: INTERNAL MEDICINE

## 2025-03-25 PROCEDURE — 91200 LIVER ELASTOGRAPHY: CPT | Performed by: INTERNAL MEDICINE

## 2025-03-25 ASSESSMENT — PATIENT HEALTH QUESTIONNAIRE - PHQ9
2. FEELING DOWN, DEPRESSED OR HOPELESS: SEVERAL DAYS
DEPRESSION UNABLE TO ASSESS: FUNCTIONAL CAPACITY MOTIVATION LIMITS ACCURACY
1. LITTLE INTEREST OR PLEASURE IN DOING THINGS: SEVERAL DAYS
SUM OF ALL RESPONSES TO PHQ QUESTIONS 1-9: 2

## 2025-03-25 ASSESSMENT — ANXIETY QUESTIONNAIRES
2. NOT BEING ABLE TO STOP OR CONTROL WORRYING: SEVERAL DAYS
4. TROUBLE RELAXING: SEVERAL DAYS
6. BECOMING EASILY ANNOYED OR IRRITABLE: SEVERAL DAYS
7. FEELING AFRAID AS IF SOMETHING AWFUL MIGHT HAPPEN: SEVERAL DAYS
1. FEELING NERVOUS, ANXIOUS, OR ON EDGE: SEVERAL DAYS
3. WORRYING TOO MUCH ABOUT DIFFERENT THINGS: SEVERAL DAYS
IF YOU CHECKED OFF ANY PROBLEMS ON THIS QUESTIONNAIRE, HOW DIFFICULT HAVE THESE PROBLEMS MADE IT FOR YOU TO DO YOUR WORK, TAKE CARE OF THINGS AT HOME, OR GET ALONG WITH OTHER PEOPLE: NOT DIFFICULT AT ALL

## 2025-03-25 NOTE — PROGRESS NOTES
Chief Complaint   Patient presents with    Follow-up     fibroscan     Vitals:    03/25/25 0741   BP: 120/81   BP Site: Left Upper Arm   Patient Position: Sitting   Pulse: 91   Temp: 98.7 °F (37.1 °C)   TempSrc: Temporal   SpO2: 94%   Weight: 86.4 kg (190 lb 6.4 oz)   Height: 1.626 m (5' 4\")     .  \"Have you been to the ER, urgent care clinic since your last visit?  Hospitalized since your last visit?\"    NO    “Have you seen or consulted any other health care providers outside of [facility] since your last visit?”    NO            Click Here for Release of Records Request    
isoenzymes indicated a higher than typical bone fraction with normal GGT.  This supports nonhepatic source of ALP.   She reports that she has had significant ongoing arthritic joint pain.     She has been on resmetirom for management of MASH for the past 6 months and has related increased constipation with this medication.  I have asked her to increase fluid intake and consider osmotic laxative like Miralax to help promote better adherence to resmetirom.     She has a target weight of ~175-80. Currently at ~190#.    Counseling for diet and weight loss in patients with confirmed or suspected MASLD  The patient was counseled regarding diet and exercise to achieve weight loss.  The best diet for patients with fatty liver is one very low in carbohydrates and enriched with protein/fiber.    Using glitazones in patients without diabetes mellitus has been shown to reduce fat content in the liver but has no effect on fibrosis and is associated with weight gain.  Vitamin E has also been used but the data is not very good and most experts no longer advocate this.      She is continuing with GLP-1 agonist and tolerates this medication well, continue follow-up with PCP.     Screening for Hepatocellular Carcinoma  HCC screening is not necessary if the patient has no evidence of cirrhosis.    Treatment of other medical problems in patients with chronic liver disease  There are no contraindications for the patient to take most medications that are necessary for treatment of other medical issues.    Counseling for alcohol in patients with chronic liver disease  The patient was counseled regarding alcohol consumption and the effect of alcohol on chronic liver disease.  The patient does not consume any significant amount of alcohol.    Vaccinations   Vaccination for viral hepatitis A and B is not needed.  The patient has serologic evidence of prior exposure or vaccination with immunity.  Routine vaccinations against other bacterial

## 2025-03-27 ENCOUNTER — RESULTS FOLLOW-UP (OUTPATIENT)
Age: 60
End: 2025-03-27

## 2025-04-03 DIAGNOSIS — J01.90 ACUTE SINUSITIS, RECURRENCE NOT SPECIFIED, UNSPECIFIED LOCATION: ICD-10-CM

## 2025-04-03 RX ORDER — AZITHROMYCIN 250 MG/1
TABLET, FILM COATED ORAL
Qty: 6 TABLET | Refills: 0 | Status: SHIPPED | OUTPATIENT
Start: 2025-04-03

## 2025-04-16 DIAGNOSIS — E11.9 TYPE 2 DIABETES MELLITUS WITHOUT COMPLICATIONS: ICD-10-CM

## 2025-04-16 RX ORDER — ATORVASTATIN CALCIUM 10 MG/1
10 TABLET, FILM COATED ORAL DAILY
Qty: 90 TABLET | Refills: 3 | Status: SHIPPED | OUTPATIENT
Start: 2025-04-16

## 2025-04-16 RX ORDER — TRIAMTERENE AND HYDROCHLOROTHIAZIDE 37.5; 25 MG/1; MG/1
1 TABLET ORAL DAILY
Qty: 90 TABLET | Refills: 3 | Status: SHIPPED | OUTPATIENT
Start: 2025-04-16

## 2025-04-16 NOTE — TELEPHONE ENCOUNTER
Caller request adjustment of:  metFORMIN (GLUCOPHAGE) 500 MG tablet     atorvastatin (LIPITOR) 10 MG tablet     triamterene-hydroCHLOROthiazide (MAXZIDE-25) 37.5-25 MG per tablet       Please send to:  [facility] - [place] Silva - [address] - P [phone] - F [phone]          Visit / Appointment History:  Future Appointment at [facility]:  5/27/2025   Last Appointment With PCP:  11/12/2024       Caller confirmed instructions and dosages as correct.    Caller was advised that Meds will be refilled as soon as possible, however there can be a 48-72 business hour turn around on refill requests.

## 2025-04-16 NOTE — TELEPHONE ENCOUNTER
PCP: Rosendo Au MD    Last appt: 11/12/2024   Future Appointments   Date Time Provider Department Center   5/27/2025  9:30 AM Rosendo Au MD [facility]   9/25/2025  9:00 AM Aidee Sibley PA [facility]       Requested Prescriptions     Pending Prescriptions Disp Refills    atorvastatin (LIPITOR) 10 MG tablet 90 tablet 0     Sig: Take 1 tablet by mouth daily    metFORMIN (GLUCOPHAGE) 500 MG tablet 360 tablet 0     Sig: Take 2 tablets by mouth 2 times daily (with meals)    triamterene-hydroCHLOROthiazide (MAXZIDE-25) 37.5-25 MG per tablet 90 tablet 0     Sig: Take 1 tablet by mouth daily

## 2025-05-06 RX ORDER — SEMAGLUTIDE 0.68 MG/ML
INJECTION, SOLUTION SUBCUTANEOUS
Qty: 3 ML | Refills: 5 | Status: SHIPPED | OUTPATIENT
Start: 2025-05-06

## 2025-06-04 DIAGNOSIS — G47.00 INSOMNIA, UNSPECIFIED TYPE: ICD-10-CM

## 2025-06-04 RX ORDER — ZOLPIDEM TARTRATE 10 MG/1
10 TABLET ORAL NIGHTLY
Qty: 90 TABLET | Refills: 1 | Status: SHIPPED | OUTPATIENT
Start: 2025-06-04 | End: 2025-12-01

## 2025-06-26 NOTE — PROGRESS NOTES
Pulses: Normal pulses.   Pulmonary:      Effort: Pulmonary effort is normal.   Abdominal:      General: Abdomen is flat. Bowel sounds are normal.   Musculoskeletal:         General: Normal range of motion.      Cervical back: Normal range of motion.      Right lower leg: No edema.   Neurological:      General: No focal deficit present.      Mental Status: She is alert.   Psychiatric:         Mood and Affect: Mood normal.         Behavior: Behavior normal.         Thought Content: Thought content normal.         Judgment: Judgment normal.          ASSESSMENT and PLAN  There are no diagnoses linked to this encounter.   Aleshia was seen today for diabetes and hypertension.    Diagnoses and all orders for this visit:    Type 2 diabetes mellitus without complication, without long-term current use of insulin (HCC)  -     Hemoglobin A1C; Future  -     Basic Metabolic Panel; Future  -     semaglutide, 2 MG/DOSE, (OZEMPIC) 8 MG/3ML SOPN sc injection; Inject 2 mg into the skin every 7 days   Good control   Increase ozempic dose for obesity   Continue metformin and diet  Hyperlipidemia, unspecified hyperlipidemia type  -     Lipid Panel; Future  -     atorvastatin (LIPITOR) 10 MG tablet; Take 1 tablet by mouth daily    Hepatic steatosis   Fu Liver inst  Fibromyalgia   Per Dr Floyd on muscle relaxer  Anxiety   Stable on lexapro and buspar  Insomnia, unspecified type  -     zolpidem (AMBIEN) 10 MG tablet; Take 1 tablet by mouth nightly for 180 days. at bedtime. Max Daily Amount: 10 mg     Rtc 6months CPE  Rosendo Au MD

## 2025-06-27 ENCOUNTER — OFFICE VISIT (OUTPATIENT)
Age: 60
End: 2025-06-27
Payer: COMMERCIAL

## 2025-06-27 VITALS
DIASTOLIC BLOOD PRESSURE: 80 MMHG | HEIGHT: 64 IN | OXYGEN SATURATION: 96 % | TEMPERATURE: 97.7 F | HEART RATE: 78 BPM | SYSTOLIC BLOOD PRESSURE: 126 MMHG | WEIGHT: 191 LBS | RESPIRATION RATE: 18 BRPM | BODY MASS INDEX: 32.61 KG/M2

## 2025-06-27 DIAGNOSIS — E11.9 TYPE 2 DIABETES MELLITUS WITHOUT COMPLICATION, WITHOUT LONG-TERM CURRENT USE OF INSULIN (HCC): ICD-10-CM

## 2025-06-27 DIAGNOSIS — E78.5 HYPERLIPIDEMIA, UNSPECIFIED HYPERLIPIDEMIA TYPE: ICD-10-CM

## 2025-06-27 DIAGNOSIS — E11.9 TYPE 2 DIABETES MELLITUS WITHOUT COMPLICATION, WITHOUT LONG-TERM CURRENT USE OF INSULIN (HCC): Primary | ICD-10-CM

## 2025-06-27 DIAGNOSIS — K76.0 HEPATIC STEATOSIS: ICD-10-CM

## 2025-06-27 DIAGNOSIS — M79.7 FIBROMYALGIA: ICD-10-CM

## 2025-06-27 DIAGNOSIS — G47.00 INSOMNIA, UNSPECIFIED TYPE: ICD-10-CM

## 2025-06-27 DIAGNOSIS — F41.9 ANXIETY: ICD-10-CM

## 2025-06-27 PROCEDURE — 99214 OFFICE O/P EST MOD 30 MIN: CPT | Performed by: INTERNAL MEDICINE

## 2025-06-27 RX ORDER — ATORVASTATIN CALCIUM 10 MG/1
10 TABLET, FILM COATED ORAL DAILY
Qty: 90 TABLET | Refills: 3 | Status: SHIPPED | OUTPATIENT
Start: 2025-06-27

## 2025-06-27 RX ORDER — ZOLPIDEM TARTRATE 10 MG/1
10 TABLET ORAL NIGHTLY
Qty: 90 TABLET | Refills: 1 | Status: SHIPPED | OUTPATIENT
Start: 2025-06-27 | End: 2025-12-24

## 2025-06-27 SDOH — ECONOMIC STABILITY: FOOD INSECURITY: WITHIN THE PAST 12 MONTHS, THE FOOD YOU BOUGHT JUST DIDN'T LAST AND YOU DIDN'T HAVE MONEY TO GET MORE.: NEVER TRUE

## 2025-06-27 SDOH — ECONOMIC STABILITY: TRANSPORTATION INSECURITY
IN THE PAST 12 MONTHS, HAS LACK OF TRANSPORTATION KEPT YOU FROM MEETINGS, WORK, OR FROM GETTING THINGS NEEDED FOR DAILY LIVING?: NO

## 2025-06-27 SDOH — ECONOMIC STABILITY: TRANSPORTATION INSECURITY
IN THE PAST 12 MONTHS, HAS THE LACK OF TRANSPORTATION KEPT YOU FROM MEDICAL APPOINTMENTS OR FROM GETTING MEDICATIONS?: NO

## 2025-06-27 SDOH — ECONOMIC STABILITY: INCOME INSECURITY: IN THE LAST 12 MONTHS, WAS THERE A TIME WHEN YOU WERE NOT ABLE TO PAY THE MORTGAGE OR RENT ON TIME?: NO

## 2025-06-27 SDOH — ECONOMIC STABILITY: FOOD INSECURITY: WITHIN THE PAST 12 MONTHS, YOU WORRIED THAT YOUR FOOD WOULD RUN OUT BEFORE YOU GOT MONEY TO BUY MORE.: NEVER TRUE

## 2025-06-28 ENCOUNTER — RESULTS FOLLOW-UP (OUTPATIENT)
Age: 60
End: 2025-06-28

## 2025-06-28 LAB
ANION GAP SERPL CALC-SCNC: 3 MMOL/L (ref 2–12)
BUN SERPL-MCNC: 15 MG/DL (ref 6–20)
BUN/CREAT SERPL: 18 (ref 12–20)
CALCIUM SERPL-MCNC: 9.1 MG/DL (ref 8.5–10.1)
CHLORIDE SERPL-SCNC: 100 MMOL/L (ref 97–108)
CHOLEST SERPL-MCNC: 167 MG/DL
CO2 SERPL-SCNC: 33 MMOL/L (ref 21–32)
CREAT SERPL-MCNC: 0.82 MG/DL (ref 0.55–1.02)
EST. AVERAGE GLUCOSE BLD GHB EST-MCNC: 123 MG/DL
GLUCOSE SERPL-MCNC: 82 MG/DL (ref 65–100)
HBA1C MFR BLD: 5.9 % (ref 4–5.6)
HDLC SERPL-MCNC: 61 MG/DL
HDLC SERPL: 2.7 (ref 0–5)
LDLC SERPL CALC-MCNC: 73.6 MG/DL (ref 0–100)
POTASSIUM SERPL-SCNC: 4.2 MMOL/L (ref 3.5–5.1)
SODIUM SERPL-SCNC: 136 MMOL/L (ref 136–145)
TRIGL SERPL-MCNC: 162 MG/DL
VLDLC SERPL CALC-MCNC: 32.4 MG/DL

## 2025-09-03 RX ORDER — OMEPRAZOLE 20 MG/1
CAPSULE, DELAYED RELEASE ORAL
Qty: 90 CAPSULE | Refills: 3 | Status: SHIPPED | OUTPATIENT
Start: 2025-09-03

## (undated) DEVICE — PADDING CST 4INX4YD --

## (undated) DEVICE — 4-PORT MANIFOLD: Brand: NEPTUNE 2

## (undated) DEVICE — SUTURE VCRL SZ 3-0 L27IN ABSRB UD L26MM SH 1/2 CIR J416H

## (undated) DEVICE — SOLUTION LACTATED RINGERS INJECTION USP

## (undated) DEVICE — DYONICS 25 INFLOW/OUTFLOW TUBE                                    SET, SINGLE SUCTION, 3 PER BOX

## (undated) DEVICE — BNDG COHESIVE 4X5YD TAN LTX -- CONVERT TO ITEM 357347

## (undated) DEVICE — MASTISOL ADHESIVE LIQ 2/3ML

## (undated) DEVICE — SOLUTION SURG PREP 26 CC PURPREP

## (undated) DEVICE — DRAPE,EXTREMITY,89X128,STERILE: Brand: MEDLINE

## (undated) DEVICE — OCCLUSIVE GAUZE STRIP,3% BISMUTH TRIBROMOPHENATE IN PETROLATUM BLEND: Brand: XEROFORM

## (undated) DEVICE — ZIMMER® STERILE DISPOSABLE TOURNIQUET CUFF WITH PROTECTIVE SLEEVE AND PLC, DUAL PORT, SINGLE BLADDER, 18 IN. (46 CM)

## (undated) DEVICE — INFECTION CONTROL KIT SYS

## (undated) DEVICE — PADDING CST 4IN STERILE --

## (undated) DEVICE — BANDAGE,GAUZE,BULKEE II,4.5"X4.1YD,STRL: Brand: MEDLINE

## (undated) DEVICE — MEDI-VAC NON-CONDUCTIVE SUCTION TUBING: Brand: CARDINAL HEALTH

## (undated) DEVICE — NEEDLE HYPO 18GA L1.5IN PNK S STL HUB POLYPR SHLD REG BVL

## (undated) DEVICE — BANDAGE COMPR 9 FTX4 IN SMOOTH COMFORTABLE SYNTH ESMRK LF

## (undated) DEVICE — SUTURE ETHLN SZ 4-0 L18IN NONABSORBABLE BLK L19MM PS-2 3/8 1667H

## (undated) DEVICE — DRAPE,REIN 53X77,STERILE: Brand: MEDLINE

## (undated) DEVICE — BNDG ELAS HK LOOP 6X5YD NS -- MATRIX

## (undated) DEVICE — SUTURE ETHIB EXCL BR GRN TAPR PT 2-0 30 X563H X563H

## (undated) DEVICE — DRESSING,GAUZE,XEROFORM,CURAD,1"X8",ST: Brand: CURAD

## (undated) DEVICE — CONTINU-FLO SOLUTION SET, 2 INJECTION SITES, MALE LUER LOCK ADAPTER WITH RETRACTABLE COLLAR, LARGE BORE STOPCOCK WITH ROTATING MALE LUER LOCK EXTENSION SET, 2 INJECTION SITES, MALE LUER LOCK ADAPTER WITH RETRACTABLE COLLAR: Brand: INTERLINK/CONTINU-FLO

## (undated) DEVICE — SUT ETHLN 5-0 18IN PS2 BLK --

## (undated) DEVICE — HANDLE LT SNAP ON ULT DURABLE LENS FOR TRUMPF ALC DISPOSABLE

## (undated) DEVICE — SOLUTION IRRIG 3000ML LAC RINGERS ARTHROMTC PLAS CONT

## (undated) DEVICE — SYR LR LCK 1ML GRAD NSAF 30ML --

## (undated) DEVICE — DRAPE, EXTREMITY, BILATERAL, STERILE: Brand: MEDLINE

## (undated) DEVICE — NEEDLE HYPO 25GA L1.5IN BVL ORIENTED ECLIPSE

## (undated) DEVICE — SURGICAL PROCEDURE PACK BASIN MAJ SET CUST NO CAUT

## (undated) DEVICE — KENDALL DL ECG CABLE AND LEAD WIRE SYSTEM, 3-LEAD, SINGLE PATIENT USE: Brand: KENDALL

## (undated) DEVICE — SPONGE GZ W4XL4IN COT 12 PLY TYP VII WVN C FLD DSGN

## (undated) DEVICE — STERILE POLYISOPRENE POWDER-FREE SURGICAL GLOVES WITH EMOLLIENT COATING: Brand: PROTEXIS

## (undated) DEVICE — GAUZE SPONGES,12 PLY: Brand: CURITY

## (undated) DEVICE — Y-TYPE TUR/BLADDER IRRIGATION SET, REGULATING CLAMP

## (undated) DEVICE — HOOK LOCK LATEX FREE ELASTIC BANDAGE 4INX5YD

## (undated) DEVICE — 3M™ TEGADERM™ TRANSPARENT FILM DRESSING FRAME STYLE, 1624W, 2-3/8 IN X 2-3/4 IN (6 CM X 7 CM), 100/CT 4CT/CASE: Brand: 3M™ TEGADERM™

## (undated) DEVICE — SOL IRRIGATION INJ NACL 0.9% 500ML BTL

## (undated) DEVICE — KERLIX BANDAGE ROLL: Brand: KERLIX

## (undated) DEVICE — MAX-CORE® DISPOSABLE CORE BIOPSY INSTRUMENT, 16G X 16CM: Brand: MAX-CORE

## (undated) DEVICE — Device

## (undated) DEVICE — DEVON™ KNEE AND BODY STRAP 60" X 3" (1.5 M X 7.6 CM): Brand: DEVON

## (undated) DEVICE — SUTURE VCRL SZ 2-0 L27IN ABSRB VLT L26MM SH 1/2 CIR J317H

## (undated) DEVICE — PADDING CAST SPEC 6INX4YD COT --

## (undated) DEVICE — SYR 10ML LUER LOK 1/5ML GRAD --

## (undated) DEVICE — PADDING CST CRMPD 3INX4YD NS --

## (undated) DEVICE — ARGYLE FRAZIER SURGICAL SUCTION INSTRUMENT 10 FR/CH (3.3 MM): Brand: ARGYLE

## (undated) DEVICE — SPLINT CAST PLASTER 4X15FT -- DYNACAST

## (undated) DEVICE — ARTHROSCOPY RICHMOND-LF: Brand: MEDLINE INDUSTRIES, INC.

## (undated) DEVICE — SYR IRR BLB 2OZ DISP BLU STRL -- CONVERT TO ITEM 357637

## (undated) DEVICE — STERILE POLYISOPRENE POWDER-FREE SURGICAL GLOVES: Brand: PROTEXIS

## (undated) DEVICE — TRAY BX SFT TISS W/ RUL ALC PREP PD FEN DRP TWL LUERLOCK

## (undated) DEVICE — PREP SKN PREVAIL 40ML APPL --

## (undated) DEVICE — BLADE 1884008 RADENOID 5PK 4MM: Brand: RAD®

## (undated) DEVICE — KENDALL SCD EXPRESS SLEEVES, KNEE LENGTH, MEDIUM: Brand: KENDALL SCD

## (undated) DEVICE — REM POLYHESIVE ADULT PATIENT RETURN ELECTRODE: Brand: VALLEYLAB